# Patient Record
Sex: FEMALE | Race: WHITE | NOT HISPANIC OR LATINO | Employment: OTHER | URBAN - METROPOLITAN AREA
[De-identification: names, ages, dates, MRNs, and addresses within clinical notes are randomized per-mention and may not be internally consistent; named-entity substitution may affect disease eponyms.]

---

## 2017-01-11 ENCOUNTER — HOSPITAL ENCOUNTER (OUTPATIENT)
Dept: FAMILY MEDICINE CLINIC | Facility: CLINIC | Age: 77
Setting detail: SPECIMEN
Discharge: HOME | End: 2017-01-11
Attending: FAMILY MEDICINE | Admitting: FAMILY MEDICINE

## 2017-01-11 LAB
ALBUMIN SERPL-MCNC: 3.9 G/DL (ref 3.5–4.8)
ALBUMIN/GLOB SERPL: 1.2 {RATIO} (ref 1–1.7)
ALP SERPL-CCNC: 76 IU/L (ref 32–91)
ALT SERPL-CCNC: 17 IU/L (ref 14–54)
ANION GAP SERPL CALC-SCNC: 14.3 MMOL/L (ref 10–20)
AST SERPL-CCNC: 23 IU/L (ref 15–41)
BILIRUB SERPL-MCNC: 0.7 MG/DL (ref 0.3–1.2)
BUN SERPL-MCNC: 26 MG/DL (ref 8–20)
BUN/CREAT SERPL: 21.7 (ref 5.4–26.2)
CALCIUM SERPL-MCNC: 9.5 MG/DL (ref 8.9–10.3)
CHLORIDE SERPL-SCNC: 108 MMOL/L (ref 101–111)
CHOLEST SERPL-MCNC: 227 MG/DL
CHOLEST/HDLC SERPL: 3.7 {RATIO}
CONV CO2: 22 MMOL/L (ref 22–32)
CONV LDL CHOLESTEROL DIRECT: 141 MG/DL (ref 0–100)
CONV TOTAL PROTEIN: 7.2 G/DL (ref 6.1–7.9)
CREAT UR-MCNC: 1.2 MG/DL (ref 0.4–1)
GLOBULIN UR ELPH-MCNC: 3.3 G/DL (ref 2.5–3.8)
GLUCOSE SERPL-MCNC: 116 MG/DL (ref 65–99)
HDLC SERPL-MCNC: 62 MG/DL
LDLC/HDLC SERPL: 2.3 {RATIO}
LIPID INTERPRETATION: ABNORMAL
POTASSIUM SERPL-SCNC: 5.3 MMOL/L (ref 3.6–5.1)
SODIUM SERPL-SCNC: 139 MMOL/L (ref 136–144)
TRIGL SERPL-MCNC: 148 MG/DL
VLDLC SERPL CALC-MCNC: 25 MG/DL

## 2017-01-16 ENCOUNTER — HOSPITAL ENCOUNTER (OUTPATIENT)
Dept: PHYSICAL THERAPY | Facility: HOSPITAL | Age: 77
Setting detail: RECURRING SERIES
Discharge: HOME | End: 2017-01-31
Attending: FAMILY MEDICINE | Admitting: FAMILY MEDICINE

## 2017-04-25 ENCOUNTER — HOSPITAL ENCOUNTER (OUTPATIENT)
Dept: LAB | Facility: HOSPITAL | Age: 77
Discharge: HOME | End: 2017-04-25
Attending: INTERNAL MEDICINE | Admitting: INTERNAL MEDICINE

## 2017-04-25 LAB
ANION GAP SERPL CALC-SCNC: 16.9 MMOL/L (ref 10–20)
BACTERIA SPEC AEROBE CULT: NORMAL
BILIRUB UR QL STRIP: NEGATIVE MG/DL
BUN SERPL-MCNC: 30 MG/DL (ref 8–20)
BUN/CREAT SERPL: 20 (ref 5.4–26.2)
CALCIUM SERPL-MCNC: 9.6 MG/DL (ref 8.9–10.3)
CASTS URNS QL MICRO: ABNORMAL /[LPF]
CHLORIDE SERPL-SCNC: 108 MMOL/L (ref 101–111)
COLOR UR: YELLOW
CONV BACTERIA IN URINE MICRO: ABNORMAL
CONV CLARITY OF URINE: ABNORMAL
CONV CO2: 23 MMOL/L (ref 22–32)
CONV HYALINE CASTS IN URINE MICRO: ABNORMAL /[LPF] (ref 0–5)
CONV PROTEIN IN URINE BY AUTOMATED TEST STRIP: NEGATIVE MG/DL
CONV SMALL ROUND CELLS: ABNORMAL /[HPF]
CONV UROBILINOGEN IN URINE BY AUTOMATED TEST STRIP: 0.2 MG/DL
CREAT UR-MCNC: 1.5 MG/DL (ref 0.4–1)
CULTURE INDICATED?: ABNORMAL
ERYTHROCYTE [DISTWIDTH] IN BLOOD BY AUTOMATED COUNT: 14.6 % (ref 11.5–14.5)
GLUCOSE SERPL-MCNC: 127 MG/DL (ref 65–99)
GLUCOSE UR QL: NEGATIVE MG/DL
HCT VFR BLD AUTO: 37.4 % (ref 35–49)
HGB BLD-MCNC: 12.5 G/DL (ref 12–15)
HGB UR QL STRIP: NEGATIVE
KETONES UR QL STRIP: NEGATIVE MG/DL
LEUKOCYTE ESTERASE UR QL STRIP: NEGATIVE
Lab: NORMAL
MCH RBC QN AUTO: 31.3 PG (ref 26–32)
MCHC RBC AUTO-ENTMCNC: 33.4 G/DL (ref 32–36)
MCV RBC AUTO: 93.8 FL (ref 80–94)
MICRO REPORT STATUS: NORMAL
NITRITE UR QL STRIP: NEGATIVE
PH UR STRIP.AUTO: 5.5 [PH] (ref 4.5–8)
PLATELET # BLD AUTO: 239 10*3/UL (ref 150–450)
PMV BLD AUTO: 8.8 FL (ref 7.4–10.4)
POTASSIUM SERPL-SCNC: 4.9 MMOL/L (ref 3.6–5.1)
RBC # BLD AUTO: 3.98 10*6/UL (ref 4–5.4)
RBC #/AREA URNS HPF: 2 /[HPF] (ref 0–3)
SODIUM SERPL-SCNC: 143 MMOL/L (ref 136–144)
SP GR UR: 1.01 (ref 1–1.03)
SPECIMEN SOURCE: NORMAL
SPERM URNS QL MICRO: ABNORMAL /[HPF]
SQUAMOUS SPT QL MICRO: 6 /[HPF] (ref 0–5)
UNIDENT CRYS URNS QL MICRO: ABNORMAL /[HPF]
WBC # BLD AUTO: 5.2 10*3/UL (ref 4.5–11.5)
WBC #/AREA URNS HPF: 5 /[HPF] (ref 0–5)
YEAST SPEC QL WET PREP: ABNORMAL /[HPF]

## 2017-07-11 ENCOUNTER — HOSPITAL ENCOUNTER (OUTPATIENT)
Dept: FAMILY MEDICINE CLINIC | Facility: CLINIC | Age: 77
Setting detail: SPECIMEN
Discharge: HOME | End: 2017-07-11
Attending: FAMILY MEDICINE | Admitting: FAMILY MEDICINE

## 2017-07-11 LAB
ALBUMIN SERPL-MCNC: 4.1 G/DL (ref 3.5–4.8)
ALBUMIN/GLOB SERPL: 1.1 {RATIO} (ref 1–1.7)
ALP SERPL-CCNC: 64 IU/L (ref 32–91)
ALT SERPL-CCNC: 20 IU/L (ref 14–54)
ANION GAP SERPL CALC-SCNC: 16.8 MMOL/L (ref 10–20)
AST SERPL-CCNC: 26 IU/L (ref 15–41)
BASOPHILS # BLD AUTO: 0 10*3/UL (ref 0–0.2)
BASOPHILS NFR BLD AUTO: 1 % (ref 0–2)
BILIRUB SERPL-MCNC: 0.9 MG/DL (ref 0.3–1.2)
BUN SERPL-MCNC: 35 MG/DL (ref 8–20)
BUN/CREAT SERPL: 21.9 (ref 5.4–26.2)
CALCIUM SERPL-MCNC: 9.6 MG/DL (ref 8.9–10.3)
CHLORIDE SERPL-SCNC: 106 MMOL/L (ref 101–111)
CHOLEST SERPL-MCNC: 244 MG/DL
CHOLEST/HDLC SERPL: 3.7 {RATIO}
CONV CO2: 20 MMOL/L (ref 22–32)
CONV LDL CHOLESTEROL DIRECT: 161 MG/DL (ref 0–100)
CONV TOTAL PROTEIN: 7.8 G/DL (ref 6.1–7.9)
CREAT UR-MCNC: 1.6 MG/DL (ref 0.4–1)
DIFFERENTIAL METHOD BLD: (no result)
EOSINOPHIL # BLD AUTO: 0.3 10*3/UL (ref 0–0.3)
EOSINOPHIL # BLD AUTO: 6 % (ref 0–3)
ERYTHROCYTE [DISTWIDTH] IN BLOOD BY AUTOMATED COUNT: 14.5 % (ref 11.5–14.5)
GLOBULIN UR ELPH-MCNC: 3.7 G/DL (ref 2.5–3.8)
GLUCOSE SERPL-MCNC: 114 MG/DL (ref 65–99)
HCT VFR BLD AUTO: 37 % (ref 35–49)
HDLC SERPL-MCNC: 66 MG/DL
HGB BLD-MCNC: 12.1 G/DL (ref 12–15)
LDLC/HDLC SERPL: 2.4 {RATIO}
LIPID INTERPRETATION: ABNORMAL
LYMPHOCYTES # BLD AUTO: 1.7 10*3/UL (ref 0.8–4.8)
LYMPHOCYTES NFR BLD AUTO: 29 % (ref 18–42)
MCH RBC QN AUTO: 31.2 PG (ref 26–32)
MCHC RBC AUTO-ENTMCNC: 32.7 G/DL (ref 32–36)
MCV RBC AUTO: 95.6 FL (ref 80–94)
MONOCYTES # BLD AUTO: 0.6 10*3/UL (ref 0.1–1.3)
MONOCYTES NFR BLD AUTO: 10 % (ref 2–11)
NEUTROPHILS # BLD AUTO: 3.2 10*3/UL (ref 2.3–8.6)
NEUTROPHILS NFR BLD AUTO: 54 % (ref 50–75)
NRBC BLD AUTO-RTO: 0 /100{WBCS}
NRBC/RBC NFR BLD MANUAL: 0 10*3/UL
PLATELET # BLD AUTO: 258 10*3/UL (ref 150–450)
PMV BLD AUTO: 8.4 FL (ref 7.4–10.4)
POTASSIUM SERPL-SCNC: 4.8 MMOL/L (ref 3.6–5.1)
RBC # BLD AUTO: 3.86 10*6/UL (ref 4–5.4)
SODIUM SERPL-SCNC: 138 MMOL/L (ref 136–144)
TRIGL SERPL-MCNC: 132 MG/DL
TSH SERPL-ACNC: 2.18 UIU/ML (ref 0.34–5.6)
VLDLC SERPL CALC-MCNC: 17.3 MG/DL
WBC # BLD AUTO: 5.8 10*3/UL (ref 4.5–11.5)

## 2017-07-19 ENCOUNTER — HOSPITAL ENCOUNTER (OUTPATIENT)
Dept: CARDIOLOGY | Facility: HOSPITAL | Age: 77
Discharge: HOME | End: 2017-07-19
Attending: INTERNAL MEDICINE | Admitting: INTERNAL MEDICINE

## 2017-10-26 ENCOUNTER — HOSPITAL ENCOUNTER (OUTPATIENT)
Dept: LAB | Facility: HOSPITAL | Age: 77
Discharge: HOME | End: 2017-10-26
Attending: INTERNAL MEDICINE | Admitting: INTERNAL MEDICINE

## 2017-10-26 LAB
ANION GAP SERPL CALC-SCNC: 11.4 MMOL/L (ref 10–20)
BACTERIA SPEC AEROBE CULT: NORMAL
BACTERIA SPEC AEROBE CULT: NORMAL
BASOPHILS # BLD AUTO: 0 10*3/UL (ref 0–0.2)
BASOPHILS NFR BLD AUTO: 1 % (ref 0–2)
BILIRUB UR QL STRIP: NEGATIVE MG/DL
BUN SERPL-MCNC: 26 MG/DL (ref 8–20)
BUN/CREAT SERPL: 20 (ref 5.4–26.2)
CALCIUM SERPL-MCNC: 9.2 MG/DL (ref 8.9–10.3)
CASTS URNS QL MICRO: ABNORMAL /[LPF]
CHLORIDE SERPL-SCNC: 106 MMOL/L (ref 101–111)
COLONY COUNT: NORMAL
COLOR UR: YELLOW
CONV BACTERIA IN URINE MICRO: ABNORMAL
CONV CLARITY OF URINE: ABNORMAL
CONV CO2: 23 MMOL/L (ref 22–32)
CONV HYALINE CASTS IN URINE MICRO: 0 /[LPF] (ref 0–5)
CONV PROTEIN IN URINE BY AUTOMATED TEST STRIP: NEGATIVE MG/DL
CONV SMALL ROUND CELLS: ABNORMAL /[HPF]
CONV UROBILINOGEN IN URINE BY AUTOMATED TEST STRIP: 0.2 MG/DL
CREAT UR-MCNC: 1.3 MG/DL (ref 0.4–1)
CULTURE INDICATED?: ABNORMAL
DIFFERENTIAL METHOD BLD: (no result)
EOSINOPHIL # BLD AUTO: 0.3 10*3/UL (ref 0–0.3)
EOSINOPHIL # BLD AUTO: 6 % (ref 0–3)
ERYTHROCYTE [DISTWIDTH] IN BLOOD BY AUTOMATED COUNT: 13.7 % (ref 11.5–14.5)
GLUCOSE SERPL-MCNC: 117 MG/DL (ref 65–99)
GLUCOSE UR QL: NEGATIVE MG/DL
HCT VFR BLD AUTO: 38.3 % (ref 35–49)
HGB BLD-MCNC: 12.6 G/DL (ref 12–15)
HGB UR QL STRIP: NEGATIVE
KETONES UR QL STRIP: NEGATIVE MG/DL
LEUKOCYTE ESTERASE UR QL STRIP: ABNORMAL
LYMPHOCYTES # BLD AUTO: 1.5 10*3/UL (ref 0.8–4.8)
LYMPHOCYTES NFR BLD AUTO: 29 % (ref 18–42)
Lab: NORMAL
MCH RBC QN AUTO: 31.3 PG (ref 26–32)
MCHC RBC AUTO-ENTMCNC: 32.9 G/DL (ref 32–36)
MCV RBC AUTO: 95.3 FL (ref 80–94)
MICRO REPORT STATUS: NORMAL
MONOCYTES # BLD AUTO: 0.5 10*3/UL (ref 0.1–1.3)
MONOCYTES NFR BLD AUTO: 9 % (ref 2–11)
NEUTROPHILS # BLD AUTO: 2.9 10*3/UL (ref 2.3–8.6)
NEUTROPHILS NFR BLD AUTO: 55 % (ref 50–75)
NITRITE UR QL STRIP: NEGATIVE
NRBC BLD AUTO-RTO: 0 /100{WBCS}
NRBC/RBC NFR BLD MANUAL: 0 10*3/UL
PH UR STRIP.AUTO: 5.5 [PH] (ref 4.5–8)
PHOSPHATE SERPL-MCNC: 3.6 MG/DL (ref 2.4–4.7)
PLATELET # BLD AUTO: 252 10*3/UL (ref 150–450)
PMV BLD AUTO: 8.4 FL (ref 7.4–10.4)
POTASSIUM SERPL-SCNC: 4.4 MMOL/L (ref 3.6–5.1)
RBC # BLD AUTO: 4.02 10*6/UL (ref 4–5.4)
RBC #/AREA URNS HPF: 1 /[HPF] (ref 0–3)
SODIUM SERPL-SCNC: 136 MMOL/L (ref 136–144)
SP GR UR: 1.01 (ref 1–1.03)
SPECIMEN SOURCE: NORMAL
SPERM URNS QL MICRO: ABNORMAL /[HPF]
SQUAMOUS SPT QL MICRO: 8 /[HPF] (ref 0–5)
UNIDENT CRYS URNS QL MICRO: ABNORMAL /[HPF]
WBC # BLD AUTO: 5.2 10*3/UL (ref 4.5–11.5)
WBC #/AREA URNS HPF: 8 /[HPF] (ref 0–5)
YEAST SPEC QL WET PREP: ABNORMAL /[HPF]

## 2017-10-27 LAB — PTH-INTACT SERPL-MCNC: 65 PG/ML (ref 11–72)

## 2018-01-09 ENCOUNTER — HOSPITAL ENCOUNTER (OUTPATIENT)
Dept: FAMILY MEDICINE CLINIC | Facility: CLINIC | Age: 78
Setting detail: SPECIMEN
Discharge: HOME | End: 2018-01-09
Attending: FAMILY MEDICINE | Admitting: FAMILY MEDICINE

## 2018-01-09 LAB
ALBUMIN SERPL-MCNC: 3.6 G/DL (ref 3.5–4.8)
ALBUMIN/GLOB SERPL: 1.1 {RATIO} (ref 1–1.7)
ALP SERPL-CCNC: 62 IU/L (ref 32–91)
ALT SERPL-CCNC: 21 IU/L (ref 14–54)
ANION GAP SERPL CALC-SCNC: 11 MMOL/L (ref 10–20)
AST SERPL-CCNC: 25 IU/L (ref 15–41)
BILIRUB SERPL-MCNC: 0.5 MG/DL (ref 0.3–1.2)
BUN SERPL-MCNC: 20 MG/DL (ref 8–20)
BUN/CREAT SERPL: 15.4 (ref 5.4–26.2)
CALCIUM SERPL-MCNC: 9.1 MG/DL (ref 8.9–10.3)
CHLORIDE SERPL-SCNC: 109 MMOL/L (ref 101–111)
CHOLEST SERPL-MCNC: 213 MG/DL
CHOLEST/HDLC SERPL: 3.7 {RATIO}
CONV CO2: 24 MMOL/L (ref 22–32)
CONV LDL CHOLESTEROL DIRECT: 138 MG/DL (ref 0–100)
CONV TOTAL PROTEIN: 6.9 G/DL (ref 6.1–7.9)
CREAT UR-MCNC: 1.3 MG/DL (ref 0.4–1)
GLOBULIN UR ELPH-MCNC: 3.3 G/DL (ref 2.5–3.8)
GLUCOSE SERPL-MCNC: 110 MG/DL (ref 65–99)
HDLC SERPL-MCNC: 58 MG/DL
LDLC/HDLC SERPL: 2.4 {RATIO}
LIPID INTERPRETATION: ABNORMAL
POTASSIUM SERPL-SCNC: 5 MMOL/L (ref 3.6–5.1)
SODIUM SERPL-SCNC: 139 MMOL/L (ref 136–144)
TRIGL SERPL-MCNC: 97 MG/DL
VLDLC SERPL CALC-MCNC: 17 MG/DL

## 2018-04-27 ENCOUNTER — HOSPITAL ENCOUNTER (OUTPATIENT)
Dept: LAB | Facility: HOSPITAL | Age: 78
Discharge: HOME | End: 2018-04-27
Attending: INTERNAL MEDICINE | Admitting: INTERNAL MEDICINE

## 2018-04-27 LAB
ANION GAP SERPL CALC-SCNC: 12.7 MMOL/L (ref 10–20)
BACTERIA SPEC AEROBE CULT: NORMAL
BASOPHILS # BLD AUTO: 0.1 10*3/UL (ref 0–0.2)
BASOPHILS NFR BLD AUTO: 1 % (ref 0–2)
BILIRUB UR QL STRIP: NEGATIVE MG/DL
BUN SERPL-MCNC: 39 MG/DL (ref 8–20)
BUN/CREAT SERPL: 27.9 (ref 5.4–26.2)
CALCIUM SERPL-MCNC: 9.3 MG/DL (ref 8.9–10.3)
CASTS URNS QL MICRO: ABNORMAL /[LPF]
CHLORIDE SERPL-SCNC: 107 MMOL/L (ref 101–111)
COLOR UR: YELLOW
CONV BACTERIA IN URINE MICRO: ABNORMAL
CONV CLARITY OF URINE: ABNORMAL
CONV CO2: 24 MMOL/L (ref 22–32)
CONV HYALINE CASTS IN URINE MICRO: ABNORMAL /[LPF] (ref 0–5)
CONV PROTEIN IN URINE BY AUTOMATED TEST STRIP: NEGATIVE MG/DL
CONV SMALL ROUND CELLS: ABNORMAL /[HPF]
CONV UROBILINOGEN IN URINE BY AUTOMATED TEST STRIP: 0.2 MG/DL
CREAT UR-MCNC: 1.4 MG/DL (ref 0.4–1)
CULTURE INDICATED?: ABNORMAL
DIFFERENTIAL METHOD BLD: (no result)
EOSINOPHIL # BLD AUTO: 0.4 10*3/UL (ref 0–0.3)
EOSINOPHIL # BLD AUTO: 5 % (ref 0–3)
ERYTHROCYTE [DISTWIDTH] IN BLOOD BY AUTOMATED COUNT: 13.8 % (ref 11.5–14.5)
GLUCOSE SERPL-MCNC: 123 MG/DL (ref 65–99)
GLUCOSE UR QL: NEGATIVE MG/DL
HCT VFR BLD AUTO: 37.3 % (ref 35–49)
HGB BLD-MCNC: 11.9 G/DL (ref 12–15)
HGB UR QL STRIP: NEGATIVE
KETONES UR QL STRIP: NEGATIVE MG/DL
LEUKOCYTE ESTERASE UR QL STRIP: ABNORMAL
LYMPHOCYTES # BLD AUTO: 1.9 10*3/UL (ref 0.8–4.8)
LYMPHOCYTES NFR BLD AUTO: 24 % (ref 18–42)
Lab: NORMAL
MCH RBC QN AUTO: 30.5 PG (ref 26–32)
MCHC RBC AUTO-ENTMCNC: 32.1 G/DL (ref 32–36)
MCV RBC AUTO: 95.1 FL (ref 80–94)
MICRO REPORT STATUS: NORMAL
MONOCYTES # BLD AUTO: 0.8 10*3/UL (ref 0.1–1.3)
MONOCYTES NFR BLD AUTO: 10 % (ref 2–11)
NEUTROPHILS # BLD AUTO: 5 10*3/UL (ref 2.3–8.6)
NEUTROPHILS NFR BLD AUTO: 60 % (ref 50–75)
NITRITE UR QL STRIP: NEGATIVE
NRBC BLD AUTO-RTO: 0 /100{WBCS}
NRBC/RBC NFR BLD MANUAL: 0 10*3/UL
PH UR STRIP.AUTO: 5.5 [PH] (ref 4.5–8)
PLATELET # BLD AUTO: 300 10*3/UL (ref 150–450)
PMV BLD AUTO: 8 FL (ref 7.4–10.4)
POTASSIUM SERPL-SCNC: 4.7 MMOL/L (ref 3.6–5.1)
RBC # BLD AUTO: 3.92 10*6/UL (ref 4–5.4)
RBC #/AREA URNS HPF: 2 /[HPF] (ref 0–3)
SODIUM SERPL-SCNC: 139 MMOL/L (ref 136–144)
SP GR UR: 1.02 (ref 1–1.03)
SPECIMEN SOURCE: ABNORMAL
SPECIMEN SOURCE: NORMAL
SPERM URNS QL MICRO: ABNORMAL /[HPF]
SQUAMOUS SPT QL MICRO: 7 /[HPF] (ref 0–5)
UNIDENT CRYS URNS QL MICRO: ABNORMAL /[HPF]
WBC # BLD AUTO: 8.1 10*3/UL (ref 4.5–11.5)
WBC #/AREA URNS HPF: 7 /[HPF] (ref 0–5)
YEAST SPEC QL WET PREP: ABNORMAL /[HPF]

## 2018-07-10 ENCOUNTER — HOSPITAL ENCOUNTER (OUTPATIENT)
Dept: FAMILY MEDICINE CLINIC | Facility: CLINIC | Age: 78
Setting detail: SPECIMEN
Discharge: HOME | End: 2018-07-10
Attending: FAMILY MEDICINE | Admitting: FAMILY MEDICINE

## 2018-07-10 LAB
ALBUMIN SERPL-MCNC: 3.7 G/DL (ref 3.5–4.8)
ALBUMIN/GLOB SERPL: 1 {RATIO} (ref 1–1.7)
ALP SERPL-CCNC: 62 IU/L (ref 32–91)
ALT SERPL-CCNC: 17 IU/L (ref 14–54)
ANION GAP SERPL CALC-SCNC: 12.6 MMOL/L (ref 10–20)
AST SERPL-CCNC: 24 IU/L (ref 15–41)
BASOPHILS # BLD AUTO: 0 10*3/UL (ref 0–0.2)
BASOPHILS NFR BLD AUTO: 1 % (ref 0–2)
BILIRUB SERPL-MCNC: 0.7 MG/DL (ref 0.3–1.2)
BUN SERPL-MCNC: 31 MG/DL (ref 8–20)
BUN/CREAT SERPL: 22.1 (ref 5.4–26.2)
CALCIUM SERPL-MCNC: 9.2 MG/DL (ref 8.9–10.3)
CHLORIDE SERPL-SCNC: 108 MMOL/L (ref 101–111)
CHOLEST SERPL-MCNC: 186 MG/DL
CHOLEST/HDLC SERPL: 3.5 {RATIO}
CONV CO2: 23 MMOL/L (ref 22–32)
CONV LDL CHOLESTEROL DIRECT: 115 MG/DL (ref 0–100)
CONV TOTAL PROTEIN: 7.4 G/DL (ref 6.1–7.9)
CREAT UR-MCNC: 1.4 MG/DL (ref 0.4–1)
DIFFERENTIAL METHOD BLD: (no result)
EOSINOPHIL # BLD AUTO: 0.3 10*3/UL (ref 0–0.3)
EOSINOPHIL # BLD AUTO: 5 % (ref 0–3)
ERYTHROCYTE [DISTWIDTH] IN BLOOD BY AUTOMATED COUNT: 14.5 % (ref 11.5–14.5)
GLOBULIN UR ELPH-MCNC: 3.7 G/DL (ref 2.5–3.8)
GLUCOSE SERPL-MCNC: 110 MG/DL (ref 65–99)
HCT VFR BLD AUTO: 35.1 % (ref 35–49)
HDLC SERPL-MCNC: 53 MG/DL
HGB BLD-MCNC: 11.4 G/DL (ref 12–15)
LDLC/HDLC SERPL: 2.2 {RATIO}
LIPID INTERPRETATION: ABNORMAL
LYMPHOCYTES # BLD AUTO: 1.7 10*3/UL (ref 0.8–4.8)
LYMPHOCYTES NFR BLD AUTO: 27 % (ref 18–42)
MCH RBC QN AUTO: 30.3 PG (ref 26–32)
MCHC RBC AUTO-ENTMCNC: 32.4 G/DL (ref 32–36)
MCV RBC AUTO: 93.5 FL (ref 80–94)
MONOCYTES # BLD AUTO: 0.6 10*3/UL (ref 0.1–1.3)
MONOCYTES NFR BLD AUTO: 10 % (ref 2–11)
NEUTROPHILS # BLD AUTO: 3.6 10*3/UL (ref 2.3–8.6)
NEUTROPHILS NFR BLD AUTO: 57 % (ref 50–75)
NRBC BLD AUTO-RTO: 0 /100{WBCS}
NRBC/RBC NFR BLD MANUAL: 0 10*3/UL
PLATELET # BLD AUTO: 265 10*3/UL (ref 150–450)
PMV BLD AUTO: 8.3 FL (ref 7.4–10.4)
POTASSIUM SERPL-SCNC: 5.6 MMOL/L (ref 3.6–5.1)
RBC # BLD AUTO: 3.75 10*6/UL (ref 4–5.4)
SODIUM SERPL-SCNC: 138 MMOL/L (ref 136–144)
TRIGL SERPL-MCNC: 121 MG/DL
VLDLC SERPL CALC-MCNC: 18.6 MG/DL
WBC # BLD AUTO: 6.2 10*3/UL (ref 4.5–11.5)

## 2018-07-16 ENCOUNTER — HOSPITAL ENCOUNTER (OUTPATIENT)
Dept: PHYSICAL THERAPY | Facility: HOSPITAL | Age: 78
Setting detail: RECURRING SERIES
Discharge: HOME | End: 2018-09-21
Attending: FAMILY MEDICINE | Admitting: FAMILY MEDICINE

## 2018-07-17 ENCOUNTER — HOSPITAL ENCOUNTER (OUTPATIENT)
Dept: FAMILY MEDICINE CLINIC | Facility: CLINIC | Age: 78
Setting detail: SPECIMEN
Discharge: HOME | End: 2018-07-17
Attending: FAMILY MEDICINE | Admitting: FAMILY MEDICINE

## 2018-07-17 LAB
BASOPHILS # BLD AUTO: 0 10*3/UL (ref 0–0.2)
BASOPHILS NFR BLD AUTO: 0 % (ref 0–2)
DIFFERENTIAL METHOD BLD: (no result)
EOSINOPHIL # BLD AUTO: 0.3 10*3/UL (ref 0–0.3)
EOSINOPHIL # BLD AUTO: 4 % (ref 0–3)
ERYTHROCYTE [DISTWIDTH] IN BLOOD BY AUTOMATED COUNT: 14.7 % (ref 11.5–14.5)
HCT VFR BLD AUTO: 35.8 % (ref 35–49)
HGB BLD-MCNC: 11.6 G/DL (ref 12–15)
LYMPHOCYTES # BLD AUTO: 1.9 10*3/UL (ref 0.8–4.8)
LYMPHOCYTES NFR BLD AUTO: 25 % (ref 18–42)
MCH RBC QN AUTO: 30.4 PG (ref 26–32)
MCHC RBC AUTO-ENTMCNC: 32.5 G/DL (ref 32–36)
MCV RBC AUTO: 93.4 FL (ref 80–94)
MONOCYTES # BLD AUTO: 0.9 10*3/UL (ref 0.1–1.3)
MONOCYTES NFR BLD AUTO: 11 % (ref 2–11)
NEUTROPHILS # BLD AUTO: 4.6 10*3/UL (ref 2.3–8.6)
NEUTROPHILS NFR BLD AUTO: 60 % (ref 50–75)
NRBC BLD AUTO-RTO: 0 /100{WBCS}
NRBC/RBC NFR BLD MANUAL: 0 10*3/UL
PLATELET # BLD AUTO: 278 10*3/UL (ref 150–450)
PMV BLD AUTO: 7.8 FL (ref 7.4–10.4)
RBC # BLD AUTO: 3.83 10*6/UL (ref 4–5.4)
WBC # BLD AUTO: 7.7 10*3/UL (ref 4.5–11.5)

## 2018-08-02 ENCOUNTER — HOSPITAL ENCOUNTER (OUTPATIENT)
Dept: ORTHOPEDIC SURGERY | Facility: CLINIC | Age: 78
Discharge: HOME | End: 2018-08-02
Attending: ORTHOPAEDIC SURGERY | Admitting: ORTHOPAEDIC SURGERY

## 2018-08-31 ENCOUNTER — HOSPITAL ENCOUNTER (OUTPATIENT)
Dept: INFUSION THERAPY | Facility: HOSPITAL | Age: 78
Discharge: HOME | End: 2018-08-31
Attending: PHYSICIAN ASSISTANT | Admitting: PHYSICIAN ASSISTANT

## 2018-08-31 LAB — CREAT BLDA-MCNC: 1.5 MG/DL (ref 0.6–1.3)

## 2018-10-24 ENCOUNTER — HOSPITAL ENCOUNTER (OUTPATIENT)
Dept: LAB | Facility: HOSPITAL | Age: 78
Setting detail: SPECIMEN
Discharge: HOME | End: 2018-10-24
Attending: INTERNAL MEDICINE | Admitting: INTERNAL MEDICINE

## 2018-10-24 LAB
AMPICILLIN SUSC ISLT: NORMAL
ANION GAP SERPL CALC-SCNC: 15.1 MMOL/L (ref 10–20)
AZTREONAM SUSC ISLT: NORMAL
BACTERIA ISLT: NORMAL
BACTERIA SPEC AEROBE CULT: NORMAL
BASOPHILS # BLD AUTO: 0.1 10*3/UL (ref 0–0.2)
BASOPHILS NFR BLD AUTO: 1 % (ref 0–2)
BILIRUB UR QL STRIP: NEGATIVE MG/DL
BUN SERPL-MCNC: 34 MG/DL (ref 8–20)
BUN/CREAT SERPL: 24.3 (ref 5.4–26.2)
CALCIUM SERPL-MCNC: 9.1 MG/DL (ref 8.9–10.3)
CASTS URNS QL MICRO: ABNORMAL /[LPF]
CEFAZOLIN SUSC ISLT: NORMAL
CEFEPIME SUSC ISLT: NORMAL
CEFTRIAXONE SUSC ISLT: NORMAL
CHLORIDE SERPL-SCNC: 108 MMOL/L (ref 101–111)
CIPROFLOXACIN SUSC ISLT: NORMAL
COLONY COUNT: NORMAL
COLOR UR: YELLOW
CONV BACTERIA IN URINE MICRO: ABNORMAL
CONV CLARITY OF URINE: ABNORMAL
CONV CO2: 23 MMOL/L (ref 22–32)
CONV HYALINE CASTS IN URINE MICRO: ABNORMAL /[LPF] (ref 0–5)
CONV PROTEIN IN URINE BY AUTOMATED TEST STRIP: NEGATIVE MG/DL
CONV SMALL ROUND CELLS: ABNORMAL /[HPF]
CONV UROBILINOGEN IN URINE BY AUTOMATED TEST STRIP: 0.2 MG/DL
CREAT UR-MCNC: 1.4 MG/DL (ref 0.4–1)
CULTURE INDICATED?: ABNORMAL
DIFFERENTIAL METHOD BLD: (no result)
EOSINOPHIL # BLD AUTO: 0.4 10*3/UL (ref 0–0.3)
EOSINOPHIL # BLD AUTO: 6 % (ref 0–3)
ERYTHROCYTE [DISTWIDTH] IN BLOOD BY AUTOMATED COUNT: 14.2 % (ref 11.5–14.5)
GLUCOSE SERPL-MCNC: 77 MG/DL (ref 65–99)
GLUCOSE UR QL: NEGATIVE MG/DL
HCT VFR BLD AUTO: 35.6 % (ref 35–49)
HGB BLD-MCNC: 11.9 G/DL (ref 12–15)
HGB UR QL STRIP: ABNORMAL
KETONES UR QL STRIP: NEGATIVE MG/DL
LEUKOCYTE ESTERASE UR QL STRIP: ABNORMAL
LEVOFLOXACIN SUSC ISLT: NORMAL
LYMPHOCYTES # BLD AUTO: 1.7 10*3/UL (ref 0.8–4.8)
LYMPHOCYTES NFR BLD AUTO: 30 % (ref 18–42)
Lab: NORMAL
MCH RBC QN AUTO: 31.6 PG (ref 26–32)
MCHC RBC AUTO-ENTMCNC: 33.3 G/DL (ref 32–36)
MCV RBC AUTO: 94.8 FL (ref 80–94)
MEROPENEM SUSC ISLT: NORMAL
MICRO REPORT STATUS: NORMAL
MONOCYTES # BLD AUTO: 0.6 10*3/UL (ref 0.1–1.3)
MONOCYTES NFR BLD AUTO: 11 % (ref 2–11)
NEUTROPHILS # BLD AUTO: 3.1 10*3/UL (ref 2.3–8.6)
NEUTROPHILS NFR BLD AUTO: 52 % (ref 50–75)
NITRITE UR QL STRIP: POSITIVE
NITROFURANTOIN SUSC ISLT: NORMAL
NRBC BLD AUTO-RTO: 0 /100{WBCS}
NRBC/RBC NFR BLD MANUAL: 0 10*3/UL
PH UR STRIP.AUTO: 6 [PH] (ref 4.5–8)
PIP+TAZO SUSC ISLT: NORMAL
PLATELET # BLD AUTO: 285 10*3/UL (ref 150–450)
PMV BLD AUTO: 7.7 FL (ref 7.4–10.4)
POTASSIUM SERPL-SCNC: 5.1 MMOL/L (ref 3.6–5.1)
RBC # BLD AUTO: 3.76 10*6/UL (ref 4–5.4)
RBC #/AREA URNS HPF: 5 /[HPF] (ref 0–3)
SODIUM SERPL-SCNC: 141 MMOL/L (ref 136–144)
SP GR UR: 1.01 (ref 1–1.03)
SPECIMEN SOURCE: NORMAL
SPERM URNS QL MICRO: ABNORMAL /[HPF]
SQUAMOUS SPT QL MICRO: 22 /[HPF] (ref 0–5)
SUSC METH SPEC: NORMAL
TETRACYCLINE SUSC ISLT: NORMAL
TOBRAMYCIN SUSC ISLT: NORMAL
TRIMETHOPRIM/SULFA: NORMAL
UNIDENT CRYS URNS QL MICRO: ABNORMAL /[HPF]
WBC # BLD AUTO: 5.9 10*3/UL (ref 4.5–11.5)
WBC #/AREA URNS HPF: ABNORMAL /[HPF] (ref 0–5)
YEAST SPEC QL WET PREP: ABNORMAL /[HPF]

## 2019-01-08 ENCOUNTER — HOSPITAL ENCOUNTER (OUTPATIENT)
Dept: FAMILY MEDICINE CLINIC | Facility: CLINIC | Age: 79
Setting detail: SPECIMEN
Discharge: HOME | End: 2019-01-08
Attending: FAMILY MEDICINE | Admitting: FAMILY MEDICINE

## 2019-01-08 LAB
ALBUMIN SERPL-MCNC: 3.6 G/DL (ref 3.5–4.8)
ALBUMIN/GLOB SERPL: 1 {RATIO} (ref 1–1.7)
ALP SERPL-CCNC: 68 IU/L (ref 32–91)
ALT SERPL-CCNC: 16 IU/L (ref 14–54)
AMPICILLIN SUSC ISLT: NORMAL
ANION GAP SERPL CALC-SCNC: 14.3 MMOL/L (ref 10–20)
AST SERPL-CCNC: 24 IU/L (ref 15–41)
AZTREONAM SUSC ISLT: NORMAL
BACTERIA ISLT: NORMAL
BACTERIA SPEC AEROBE CULT: NORMAL
BASOPHILS # BLD AUTO: 0.1 10*3/UL (ref 0–0.2)
BASOPHILS NFR BLD AUTO: 1 % (ref 0–2)
BILIRUB SERPL-MCNC: 0.7 MG/DL (ref 0.3–1.2)
BUN SERPL-MCNC: 36 MG/DL (ref 8–20)
BUN/CREAT SERPL: 24 (ref 5.4–26.2)
CALCIUM SERPL-MCNC: 9.2 MG/DL (ref 8.9–10.3)
CEFAZOLIN SUSC ISLT: NORMAL
CEFEPIME SUSC ISLT: NORMAL
CEFTRIAXONE SUSC ISLT: NORMAL
CHLORIDE SERPL-SCNC: 106 MMOL/L (ref 101–111)
CHOLEST SERPL-MCNC: 272 MG/DL
CHOLEST/HDLC SERPL: 5.1 {RATIO}
CIPROFLOXACIN SUSC ISLT: NORMAL
COLONY COUNT: NORMAL
CONV CO2: 21 MMOL/L (ref 22–32)
CONV LDL CHOLESTEROL DIRECT: 205 MG/DL (ref 0–100)
CONV TOTAL PROTEIN: 7.2 G/DL (ref 6.1–7.9)
CREAT UR-MCNC: 1.5 MG/DL (ref 0.4–1)
DIFFERENTIAL METHOD BLD: (no result)
EOSINOPHIL # BLD AUTO: 0.3 10*3/UL (ref 0–0.3)
EOSINOPHIL # BLD AUTO: 3 % (ref 0–3)
ERYTHROCYTE [DISTWIDTH] IN BLOOD BY AUTOMATED COUNT: 13.8 % (ref 11.5–14.5)
GLOBULIN UR ELPH-MCNC: 3.6 G/DL (ref 2.5–3.8)
GLUCOSE SERPL-MCNC: 169 MG/DL (ref 65–99)
HCT VFR BLD AUTO: 36 % (ref 35–49)
HDLC SERPL-MCNC: 53 MG/DL
HGB BLD-MCNC: 12.1 G/DL (ref 12–15)
LDLC/HDLC SERPL: 3.9 {RATIO}
LEVOFLOXACIN SUSC ISLT: NORMAL
LIPID INTERPRETATION: ABNORMAL
LYMPHOCYTES # BLD AUTO: 1.5 10*3/UL (ref 0.8–4.8)
LYMPHOCYTES NFR BLD AUTO: 20 % (ref 18–42)
Lab: NORMAL
MCH RBC QN AUTO: 32 PG (ref 26–32)
MCHC RBC AUTO-ENTMCNC: 33.5 G/DL (ref 32–36)
MCV RBC AUTO: 95.7 FL (ref 80–94)
MEROPENEM SUSC ISLT: NORMAL
MICRO REPORT STATUS: NORMAL
MONOCYTES # BLD AUTO: 0.6 10*3/UL (ref 0.1–1.3)
MONOCYTES NFR BLD AUTO: 8 % (ref 2–11)
NEUTROPHILS # BLD AUTO: 5.1 10*3/UL (ref 2.3–8.6)
NEUTROPHILS NFR BLD AUTO: 68 % (ref 50–75)
NITROFURANTOIN SUSC ISLT: NORMAL
NRBC BLD AUTO-RTO: 0 /100{WBCS}
NRBC/RBC NFR BLD MANUAL: 0 10*3/UL
PIP+TAZO SUSC ISLT: NORMAL
PLATELET # BLD AUTO: 275 10*3/UL (ref 150–450)
PMV BLD AUTO: 8.1 FL (ref 7.4–10.4)
POTASSIUM SERPL-SCNC: 5.3 MMOL/L (ref 3.6–5.1)
RBC # BLD AUTO: 3.76 10*6/UL (ref 4–5.4)
SODIUM SERPL-SCNC: 136 MMOL/L (ref 136–144)
SPECIMEN SOURCE: NORMAL
SUSC METH SPEC: NORMAL
TETRACYCLINE SUSC ISLT: NORMAL
TOBRAMYCIN SUSC ISLT: NORMAL
TRIGL SERPL-MCNC: 145 MG/DL
TRIMETHOPRIM/SULFA: NORMAL
VLDLC SERPL CALC-MCNC: 14.3 MG/DL
WBC # BLD AUTO: 7.5 10*3/UL (ref 4.5–11.5)

## 2019-02-22 ENCOUNTER — HOSPITAL ENCOUNTER (OUTPATIENT)
Dept: FAMILY MEDICINE CLINIC | Facility: CLINIC | Age: 79
Setting detail: SPECIMEN
Discharge: HOME | End: 2019-02-22
Attending: PHYSICIAN ASSISTANT | Admitting: PHYSICIAN ASSISTANT

## 2019-02-22 LAB
ALBUMIN SERPL-MCNC: 3.7 G/DL (ref 3.5–4.8)
ALBUMIN/GLOB SERPL: 1.1 {RATIO} (ref 1–1.7)
ALP SERPL-CCNC: 66 IU/L (ref 32–91)
ALT SERPL-CCNC: 17 IU/L (ref 14–54)
ANION GAP SERPL CALC-SCNC: 16.4 MMOL/L (ref 10–20)
AST SERPL-CCNC: 27 IU/L (ref 15–41)
BASOPHILS # BLD AUTO: 0 10*3/UL (ref 0–0.2)
BASOPHILS NFR BLD AUTO: 1 % (ref 0–2)
BILIRUB SERPL-MCNC: 0.7 MG/DL (ref 0.3–1.2)
BUN SERPL-MCNC: 30 MG/DL (ref 8–20)
BUN/CREAT SERPL: 21.4 (ref 5.4–26.2)
CALCIUM SERPL-MCNC: 9.3 MG/DL (ref 8.9–10.3)
CHLORIDE SERPL-SCNC: 104 MMOL/L (ref 101–111)
CONV CO2: 22 MMOL/L (ref 22–32)
CONV TOTAL PROTEIN: 7.1 G/DL (ref 6.1–7.9)
CREAT UR-MCNC: 1.4 MG/DL (ref 0.4–1)
DIFFERENTIAL METHOD BLD: (no result)
EOSINOPHIL # BLD AUTO: 0.4 10*3/UL (ref 0–0.3)
EOSINOPHIL # BLD AUTO: 6 % (ref 0–3)
ERYTHROCYTE [DISTWIDTH] IN BLOOD BY AUTOMATED COUNT: 13.9 % (ref 11.5–14.5)
GLOBULIN UR ELPH-MCNC: 3.4 G/DL (ref 2.5–3.8)
GLUCOSE SERPL-MCNC: 94 MG/DL (ref 65–99)
HCT VFR BLD AUTO: 35.2 % (ref 35–49)
HGB BLD-MCNC: 11.5 G/DL (ref 12–15)
LYMPHOCYTES # BLD AUTO: 2 10*3/UL (ref 0.8–4.8)
LYMPHOCYTES NFR BLD AUTO: 26 % (ref 18–42)
MCH RBC QN AUTO: 31.4 PG (ref 26–32)
MCHC RBC AUTO-ENTMCNC: 32.6 G/DL (ref 32–36)
MCV RBC AUTO: 96.2 FL (ref 80–94)
MONOCYTES # BLD AUTO: 0.7 10*3/UL (ref 0.1–1.3)
MONOCYTES NFR BLD AUTO: 9 % (ref 2–11)
NEUTROPHILS # BLD AUTO: 4.5 10*3/UL (ref 2.3–8.6)
NEUTROPHILS NFR BLD AUTO: 58 % (ref 50–75)
NRBC BLD AUTO-RTO: 0 /100{WBCS}
NRBC/RBC NFR BLD MANUAL: 0 10*3/UL
PLATELET # BLD AUTO: 321 10*3/UL (ref 150–450)
PMV BLD AUTO: 7.9 FL (ref 7.4–10.4)
POTASSIUM SERPL-SCNC: 4.4 MMOL/L (ref 3.6–5.1)
RBC # BLD AUTO: 3.66 10*6/UL (ref 4–5.4)
SODIUM SERPL-SCNC: 138 MMOL/L (ref 136–144)
WBC # BLD AUTO: 7.7 10*3/UL (ref 4.5–11.5)

## 2019-03-04 ENCOUNTER — HOSPITAL ENCOUNTER (OUTPATIENT)
Dept: PHYSICAL THERAPY | Facility: HOSPITAL | Age: 79
Setting detail: RECURRING SERIES
Discharge: HOME | End: 2019-06-14
Attending: PHYSICIAN ASSISTANT | Admitting: PHYSICIAN ASSISTANT

## 2019-05-01 ENCOUNTER — HOSPITAL ENCOUNTER (OUTPATIENT)
Dept: LAB | Facility: HOSPITAL | Age: 79
Setting detail: SPECIMEN
Discharge: HOME | End: 2019-05-01
Attending: INTERNAL MEDICINE | Admitting: INTERNAL MEDICINE

## 2019-06-19 ENCOUNTER — TRANSCRIBE ORDERS (OUTPATIENT)
Dept: PHYSICAL THERAPY | Facility: CLINIC | Age: 79
End: 2019-06-19

## 2019-06-19 ENCOUNTER — OFFICE VISIT (OUTPATIENT)
Dept: PHYSICAL THERAPY | Facility: CLINIC | Age: 79
End: 2019-06-19

## 2019-06-19 DIAGNOSIS — G83.10 MONOPLEGIA OF LOWER EXTREMITY, UNSPECIFIED ETIOLOGY, UNSPECIFIED LATERALITY (HCC): Primary | ICD-10-CM

## 2019-06-19 DIAGNOSIS — G83.10 MONOPLEGIA OF LOWER EXTREMITY, UNSPECIFIED ETIOLOGY, UNSPECIFIED LATERALITY (HCC): ICD-10-CM

## 2019-06-19 DIAGNOSIS — M54.5 LOW BACK PAIN, UNSPECIFIED BACK PAIN LATERALITY, UNSPECIFIED CHRONICITY, WITH SCIATICA PRESENCE UNSPECIFIED: ICD-10-CM

## 2019-06-19 DIAGNOSIS — M54.42 CHRONIC LEFT-SIDED LOW BACK PAIN WITH LEFT-SIDED SCIATICA: Primary | ICD-10-CM

## 2019-06-19 DIAGNOSIS — G89.29 CHRONIC LEFT-SIDED LOW BACK PAIN WITH LEFT-SIDED SCIATICA: Primary | ICD-10-CM

## 2019-06-19 PROCEDURE — 97140 MANUAL THERAPY 1/> REGIONS: CPT | Performed by: PHYSICAL THERAPIST

## 2019-06-19 PROCEDURE — 97530 THERAPEUTIC ACTIVITIES: CPT | Performed by: PHYSICAL THERAPIST

## 2019-06-19 NOTE — PROGRESS NOTES
Physical Therapy Daily Progress Note    Patient: Lynda Patterson   : 1940  Diagnosis/ICD-10 Code:  Chronic left-sided low back pain with left-sided sciatica [M54.42, G89.29]  Referring practitioner: Cabrera Hanna MD  Date of Initial Visit: Type: THERAPY  Noted: 2019  Today's Date: 2019  Patient seen for 25 sessions         Lynda Patterson reports: that her hip has been dong pretty well. Her hip is still bothering her sometimes but is better. She is having some tenderness in the back of hip today and she feels very stiff this morning.       Subjective     Objective   See Exercise, Manual, and Modality Logs for complete treatment.       Assessment/Plan    Progress per Plan of Care           Manual Therapy:    15     mins  32795;  Therapeutic Exercise:     4    mins  59515;     Neuromuscular Deana:        mins  25341;    Therapeutic Activity:     11     mins  07609;     Gait Training:           mins  35330;     Ultrasound:          mins  58334;    Electrical Stimulation:         mins  65408 ( );  Dry Needling          mins self-pay    Timed Treatment:   30   mins   Total Treatment:     30   mins    Lila Cordon PT  Physical Therapist

## 2019-06-24 ENCOUNTER — OFFICE VISIT (OUTPATIENT)
Dept: PHYSICAL THERAPY | Facility: CLINIC | Age: 79
End: 2019-06-24

## 2019-06-24 DIAGNOSIS — G83.10 MONOPLEGIA OF LOWER EXTREMITY, UNSPECIFIED ETIOLOGY, UNSPECIFIED LATERALITY (HCC): ICD-10-CM

## 2019-06-24 DIAGNOSIS — G89.29 CHRONIC LEFT-SIDED LOW BACK PAIN WITH LEFT-SIDED SCIATICA: Primary | ICD-10-CM

## 2019-06-24 DIAGNOSIS — M54.42 CHRONIC LEFT-SIDED LOW BACK PAIN WITH LEFT-SIDED SCIATICA: Primary | ICD-10-CM

## 2019-06-24 PROCEDURE — 97530 THERAPEUTIC ACTIVITIES: CPT | Performed by: PHYSICAL THERAPIST

## 2019-06-24 PROCEDURE — 97140 MANUAL THERAPY 1/> REGIONS: CPT | Performed by: PHYSICAL THERAPIST

## 2019-06-24 NOTE — PROGRESS NOTES
Physical Therapy Daily Progress Note      Patient: Lynda Patterson   : 1940  Diagnosis/ICD-10 Code:  Chronic left-sided low back pain with left-sided sciatica [M54.42, G89.29]  Referring practitioner: Cabrera Hanna MD  Date of Initial Visit: Type: THERAPY  Noted: 2019  Today's Date: 2019  Patient seen for 2 sessions         Lynda Patterson reports: that her hip and leg are doing better. She feels like her leg is getting stronger. She is stiff this morning. Her back has been hurting some.       Objective   See Exercise, Manual, and Modality Logs for complete treatment.       Assessment & Plan     Assessment  Assessment details: Pt continues to have difficulty with bed mobility and requires extra time to move from supine to sit and roll. Pt demonstrates difficulty with lateral ambulation with added high knees with increased LOB noted. Pt demonstrates fatigue by end of session.         Progress per Plan of Care           Manual Therapy:    15     mins  93889;  Therapeutic Exercise:         mins  98520;     Neuromuscular Deana:        mins  93657;    Therapeutic Activity:     15     mins  15441;     Gait Training:           mins  81515;     Ultrasound:          mins  91522;    Electrical Stimulation:         mins  40262 ( );  Dry Needling          mins self-pay    Timed Treatment:   30   mins   Total Treatment:     30   mins    Lila Cordon PT  Physical Therapist

## 2019-07-01 ENCOUNTER — OFFICE VISIT (OUTPATIENT)
Dept: PHYSICAL THERAPY | Facility: CLINIC | Age: 79
End: 2019-07-01

## 2019-07-01 DIAGNOSIS — M54.42 CHRONIC LEFT-SIDED LOW BACK PAIN WITH LEFT-SIDED SCIATICA: Primary | ICD-10-CM

## 2019-07-01 DIAGNOSIS — G89.29 CHRONIC LEFT-SIDED LOW BACK PAIN WITH LEFT-SIDED SCIATICA: Primary | ICD-10-CM

## 2019-07-01 DIAGNOSIS — G83.10 MONOPLEGIA OF LOWER EXTREMITY, UNSPECIFIED ETIOLOGY, UNSPECIFIED LATERALITY (HCC): ICD-10-CM

## 2019-07-01 PROCEDURE — 97140 MANUAL THERAPY 1/> REGIONS: CPT | Performed by: PHYSICAL THERAPIST

## 2019-07-01 PROCEDURE — 97530 THERAPEUTIC ACTIVITIES: CPT | Performed by: PHYSICAL THERAPIST

## 2019-07-01 NOTE — PROGRESS NOTES
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 Pt reports that she did miss her rolling chair at desk the other day and fell to floor. She was unable to get up and had to crawl to bed to pull up on it. She feels comfortable with d/c with HEP at this time.       Objective   See Exercise, Manual, and Modality Logs for complete treatment.       Assessment & Plan     Assessment  Assessment details: Pt demonstrates improved overall core stabilization and strength in LE's. Pt continues to have difficulty with standing from lower surfaces, however is able to stand from lowered mat table without difficulty. Pt encouraged to continue practicing at home. Pt encouraged to get a new chair for desk that doesn't have wheels to prevent further falls. Pt also encouraged to call or return to PT if she has any questions or concerns. Modified Oswestry: 40% impairment.         Other : Discharge in two weeks if pt does not return for further treatment          Manual Therapy:    15     mins  06848;  Therapeutic Exercise:         mins  86426;     Neuromuscular Deana:        mins  23542;    Therapeutic Activity:     15     mins  86517;     Gait Training:           mins  80191;     Ultrasound:          mins  65492;    Electrical Stimulation:         mins  01459 ( );  Dry Needling          mins self-pay    Timed Treatment:   30   mins   Total Treatment:     30   mins    Lila Cordon, PT  Physical Therapist

## 2019-07-09 ENCOUNTER — OFFICE VISIT (OUTPATIENT)
Dept: FAMILY MEDICINE CLINIC | Facility: CLINIC | Age: 79
End: 2019-07-09

## 2019-07-09 ENCOUNTER — LAB (OUTPATIENT)
Dept: FAMILY MEDICINE CLINIC | Facility: CLINIC | Age: 79
End: 2019-07-09

## 2019-07-09 VITALS
OXYGEN SATURATION: 98 % | WEIGHT: 191 LBS | HEIGHT: 61 IN | TEMPERATURE: 97.6 F | HEART RATE: 62 BPM | BODY MASS INDEX: 36.06 KG/M2 | DIASTOLIC BLOOD PRESSURE: 82 MMHG | SYSTOLIC BLOOD PRESSURE: 142 MMHG

## 2019-07-09 DIAGNOSIS — N18.30 TYPE 2 DIABETES MELLITUS WITH STAGE 3 CHRONIC KIDNEY DISEASE, WITHOUT LONG-TERM CURRENT USE OF INSULIN (HCC): ICD-10-CM

## 2019-07-09 DIAGNOSIS — E11.22 TYPE 2 DIABETES MELLITUS WITH STAGE 3 CHRONIC KIDNEY DISEASE, WITHOUT LONG-TERM CURRENT USE OF INSULIN (HCC): ICD-10-CM

## 2019-07-09 DIAGNOSIS — E78.2 MIXED HYPERLIPIDEMIA: Primary | ICD-10-CM

## 2019-07-09 DIAGNOSIS — D53.9 MACROCYTIC ANEMIA: ICD-10-CM

## 2019-07-09 DIAGNOSIS — R53.83 FATIGUE, UNSPECIFIED TYPE: ICD-10-CM

## 2019-07-09 DIAGNOSIS — N30.01 ACUTE CYSTITIS WITH HEMATURIA: ICD-10-CM

## 2019-07-09 DIAGNOSIS — R30.0 DYSURIA: ICD-10-CM

## 2019-07-09 DIAGNOSIS — E78.2 MIXED HYPERLIPIDEMIA: ICD-10-CM

## 2019-07-09 PROBLEM — K21.9 GASTROESOPHAGEAL REFLUX DISEASE: Status: ACTIVE | Noted: 2019-01-08

## 2019-07-09 PROBLEM — B02.9 SHINGLES: Status: ACTIVE | Noted: 2017-03-08

## 2019-07-09 PROBLEM — M19.90 OSTEOARTHRITIS: Status: ACTIVE | Noted: 2019-01-08

## 2019-07-09 PROBLEM — I65.29 CAROTID ARTERY STENOSIS: Status: ACTIVE | Noted: 2018-07-17

## 2019-07-09 PROBLEM — I10 HYPERTENSION: Status: ACTIVE | Noted: 2019-07-09

## 2019-07-09 PROBLEM — M10.9 GOUT: Status: ACTIVE | Noted: 2019-07-09

## 2019-07-09 PROBLEM — B02.29 POSTHERPETIC NEURALGIA: Status: ACTIVE | Noted: 2017-07-11

## 2019-07-09 PROBLEM — F41.9 ANXIETY DISORDER: Status: ACTIVE | Noted: 2019-07-09

## 2019-07-09 PROBLEM — E11.8 DISORDER ASSOCIATED WITH TYPE 2 DIABETES MELLITUS (HCC): Status: ACTIVE | Noted: 2017-01-11

## 2019-07-09 PROBLEM — F32.A DEPRESSION: Status: ACTIVE | Noted: 2019-07-09

## 2019-07-09 LAB
ALBUMIN SERPL-MCNC: 3.8 G/DL (ref 3.5–4.8)
ALBUMIN/GLOB SERPL: 1 G/DL (ref 1–1.7)
ALP SERPL-CCNC: 71 U/L (ref 32–91)
ALT SERPL W P-5'-P-CCNC: 17 U/L (ref 14–54)
ANION GAP SERPL CALCULATED.3IONS-SCNC: 15.2 MMOL/L (ref 5–15)
ARTICHOKE IGE QN: 119 MG/DL (ref 0–100)
AST SERPL-CCNC: 18 U/L (ref 15–41)
BACTERIA UR QL AUTO: ABNORMAL /HPF
BASOPHILS # BLD AUTO: 0.1 10*3/MM3 (ref 0–0.2)
BASOPHILS NFR BLD AUTO: 0.6 % (ref 0–1.5)
BILIRUB BLD-MCNC: NEGATIVE MG/DL
BILIRUB SERPL-MCNC: 0.9 MG/DL (ref 0.3–1.2)
BILIRUB UR QL STRIP: NEGATIVE
BUN BLD-MCNC: 34 MG/DL (ref 8–20)
BUN/CREAT SERPL: 24.3 (ref 5.4–26.2)
CALCIUM SPEC-SCNC: 9.2 MG/DL (ref 8.9–10.3)
CHLORIDE SERPL-SCNC: 107 MMOL/L (ref 101–111)
CHOLEST SERPL-MCNC: 181 MG/DL
CLARITY UR: ABNORMAL
CLARITY, POC: CLEAR
CO2 SERPL-SCNC: 21 MMOL/L (ref 22–32)
COLOR UR: YELLOW
COLOR UR: YELLOW
CREAT BLD-MCNC: 1.4 MG/DL (ref 0.4–1)
DEPRECATED RDW RBC AUTO: 48.6 FL (ref 37–54)
EOSINOPHIL # BLD AUTO: 0.5 10*3/MM3 (ref 0–0.4)
EOSINOPHIL NFR BLD AUTO: 4.8 % (ref 0.3–6.2)
ERYTHROCYTE [DISTWIDTH] IN BLOOD BY AUTOMATED COUNT: 14.8 % (ref 12.3–15.4)
GFR SERPL CREATININE-BSD FRML MDRD: 36 ML/MIN/1.73
GLOBULIN UR ELPH-MCNC: 3.8 GM/DL (ref 2.5–3.8)
GLUCOSE BLD-MCNC: 120 MG/DL (ref 65–99)
GLUCOSE UR STRIP-MCNC: NEGATIVE MG/DL
GLUCOSE UR STRIP-MCNC: NEGATIVE MG/DL
HBA1C MFR BLD: 6.2 % (ref 3.5–5.6)
HCT VFR BLD AUTO: 36 % (ref 34–46.6)
HDLC SERPL QL: 3.35
HDLC SERPL-MCNC: 54 MG/DL
HGB BLD-MCNC: 11.9 G/DL (ref 12–15.9)
HGB UR QL STRIP.AUTO: ABNORMAL
HYALINE CASTS UR QL AUTO: ABNORMAL /LPF
KETONES UR QL STRIP: NEGATIVE
KETONES UR QL: NEGATIVE
LDLC/HDLC SERPL: 2.03 {RATIO}
LEUKOCYTE EST, POC: ABNORMAL
LEUKOCYTE ESTERASE UR QL STRIP.AUTO: ABNORMAL
LYMPHOCYTES # BLD AUTO: 2.1 10*3/MM3 (ref 0.7–3.1)
LYMPHOCYTES NFR BLD AUTO: 22.1 % (ref 19.6–45.3)
MCH RBC QN AUTO: 31.2 PG (ref 26.6–33)
MCHC RBC AUTO-ENTMCNC: 33 G/DL (ref 31.5–35.7)
MCV RBC AUTO: 94.3 FL (ref 79–97)
MONOCYTES # BLD AUTO: 1 10*3/MM3 (ref 0.1–0.9)
MONOCYTES NFR BLD AUTO: 10.7 % (ref 5–12)
NEUTROPHILS # BLD AUTO: 5.9 10*3/MM3 (ref 1.7–7)
NEUTROPHILS NFR BLD AUTO: 61.8 % (ref 42.7–76)
NITRITE UR QL STRIP: POSITIVE
NITRITE UR-MCNC: POSITIVE MG/ML
NRBC BLD AUTO-RTO: 0.1 /100 WBC (ref 0–0.2)
PH UR STRIP.AUTO: 5.5 [PH] (ref 5–8)
PH UR: 5.5 [PH] (ref 5–8)
PLATELET # BLD AUTO: 254 10*3/MM3 (ref 140–450)
PMV BLD AUTO: 7.9 FL (ref 6–12)
POTASSIUM BLD-SCNC: 5.2 MMOL/L (ref 3.6–5.1)
PROT SERPL-MCNC: 7.6 G/DL (ref 6.1–7.9)
PROT UR QL STRIP: ABNORMAL
PROT UR STRIP-MCNC: NEGATIVE MG/DL
RBC # BLD AUTO: 3.82 10*6/MM3 (ref 3.77–5.28)
RBC # UR STRIP: ABNORMAL /UL
RBC # UR: ABNORMAL /HPF
REF LAB TEST METHOD: ABNORMAL
SODIUM BLD-SCNC: 138 MMOL/L (ref 136–144)
SP GR UR STRIP: 1.02 (ref 1–1.03)
SP GR UR: 1.01 (ref 1–1.03)
SQUAMOUS #/AREA URNS HPF: ABNORMAL /HPF
TRIGL SERPL-MCNC: 87 MG/DL
TSH SERPL DL<=0.05 MIU/L-ACNC: 2.3 MIU/ML (ref 0.34–5.6)
UROBILINOGEN UR QL STRIP: ABNORMAL
UROBILINOGEN UR QL: NORMAL
VLDLC SERPL-MCNC: 17.4 MG/DL
WBC NRBC COR # BLD: 9.5 10*3/MM3 (ref 3.4–10.8)
WBC UR QL AUTO: ABNORMAL /HPF

## 2019-07-09 PROCEDURE — 83036 HEMOGLOBIN GLYCOSYLATED A1C: CPT | Performed by: FAMILY MEDICINE

## 2019-07-09 PROCEDURE — 80053 COMPREHEN METABOLIC PANEL: CPT | Performed by: FAMILY MEDICINE

## 2019-07-09 PROCEDURE — 36415 COLL VENOUS BLD VENIPUNCTURE: CPT | Performed by: FAMILY MEDICINE

## 2019-07-09 PROCEDURE — 81001 URINALYSIS AUTO W/SCOPE: CPT | Performed by: FAMILY MEDICINE

## 2019-07-09 PROCEDURE — 87086 URINE CULTURE/COLONY COUNT: CPT | Performed by: FAMILY MEDICINE

## 2019-07-09 PROCEDURE — 85025 COMPLETE CBC W/AUTO DIFF WBC: CPT | Performed by: FAMILY MEDICINE

## 2019-07-09 PROCEDURE — 84443 ASSAY THYROID STIM HORMONE: CPT | Performed by: FAMILY MEDICINE

## 2019-07-09 PROCEDURE — 99214 OFFICE O/P EST MOD 30 MIN: CPT | Performed by: FAMILY MEDICINE

## 2019-07-09 PROCEDURE — 80061 LIPID PANEL: CPT | Performed by: FAMILY MEDICINE

## 2019-07-09 PROCEDURE — 87186 SC STD MICRODIL/AGAR DIL: CPT | Performed by: FAMILY MEDICINE

## 2019-07-09 PROCEDURE — 81003 URINALYSIS AUTO W/O SCOPE: CPT | Performed by: FAMILY MEDICINE

## 2019-07-09 RX ORDER — ALLOPURINOL 100 MG/1
TABLET ORAL
COMMUNITY
Start: 2019-04-17 | End: 2019-07-13 | Stop reason: SDUPTHER

## 2019-07-09 RX ORDER — PHENAZOPYRIDINE HYDROCHLORIDE 200 MG/1
TABLET, FILM COATED ORAL
COMMUNITY
Start: 2019-02-05 | End: 2020-01-09

## 2019-07-09 RX ORDER — GABAPENTIN 300 MG/1
CAPSULE ORAL
COMMUNITY
Start: 2019-05-23 | End: 2019-08-29 | Stop reason: SDUPTHER

## 2019-07-09 RX ORDER — GLIMEPIRIDE 2 MG/1
TABLET ORAL
COMMUNITY
Start: 2019-04-17 | End: 2019-07-16 | Stop reason: SDUPTHER

## 2019-07-09 RX ORDER — LISINOPRIL 20 MG/1
TABLET ORAL
COMMUNITY
Start: 2019-04-17 | End: 2019-10-12 | Stop reason: SDUPTHER

## 2019-07-09 RX ORDER — NITROFURANTOIN 25; 75 MG/1; MG/1
100 CAPSULE ORAL 2 TIMES DAILY
Qty: 14 CAPSULE | Refills: 0 | Status: SHIPPED | OUTPATIENT
Start: 2019-07-09 | End: 2019-07-16

## 2019-07-09 RX ORDER — LUTEIN 6 MG
TABLET ORAL
COMMUNITY
Start: 2013-11-07 | End: 2022-07-22

## 2019-07-09 RX ORDER — SERTRALINE HYDROCHLORIDE 100 MG/1
TABLET, FILM COATED ORAL
COMMUNITY
Start: 2019-04-17 | End: 2020-01-11

## 2019-07-09 RX ORDER — VIT A/VIT C/VIT E/ZINC/COPPER 7160-113
TABLET, DELAYED RELEASE (ENTERIC COATED) ORAL
COMMUNITY
Start: 2013-11-07 | End: 2023-02-20

## 2019-07-09 RX ORDER — ATORVASTATIN CALCIUM 10 MG/1
TABLET, FILM COATED ORAL
COMMUNITY
Start: 2019-05-20 | End: 2019-07-13 | Stop reason: SDUPTHER

## 2019-07-09 NOTE — PROGRESS NOTES
Subjective   Lynda Patterson is a 79 y.o. female.     Comes in for follow up on bp, chol, dm, and macrocytic anemia  Last eye exam 2/25/19  Has ckd  Has lost 5 pounds since Feb  Dysuria and incr freq for 3 weeks  bs running 140-270         The following portions of the patient's history were reviewed and updated as appropriate: allergies, current medications, past family history, past medical history, past social history, past surgical history and problem list.  Past Medical History:   Diagnosis Date   • Anxiety    • Depression    • DM2 (diabetes mellitus, type 2) (CMS/[facility])    • Gout    • Heart disease    • Hyperlipidemia    • Hypertension    • Kidney failure     Stage three kidney failure , abstraction from [facility]   • Macular degeneration      Past Surgical History:   Procedure Laterality Date   • CHOLECYSTECTOMY  1988   • TOTAL ABDOMINAL HYSTERECTOMY  1970     History reviewed. No pertinent family history.  Social History     Socioeconomic History   • Marital status:      Spouse name: Not on file   • Number of children: Not on file   • Years of education: Not on file   • Highest education level: Not on file   Tobacco Use   • Smoking status: Never Smoker   • Smokeless tobacco: Never Used   Substance and Sexual Activity   • Alcohol use: No     Frequency: Never   • Drug use: No         Current Outpatient Medications:   •  glucose blood (ONE TOUCH ULTRA TEST) test strip, ONETOUCH ULTRA BLUE STRP, Disp: , Rfl:   •  Lutein 20 MG tablet, LUTEIN 20 MG TABS, Disp: , Rfl:   •  Multiple Vitamins-Minerals (ICAPS) tablet, ICAPS AREDS FORMULA TABS, Disp: , Rfl:   •  ONE TOUCH LANCETS misc, ONETOUCH LANCETS, Disp: , Rfl:   •  phenazopyridine (PYRIDIUM) 200 MG tablet, PYRIDIUM 200 MG TABS, Disp: , Rfl:   •  allopurinol (ZYLOPRIM) 100 MG tablet, , Disp: , Rfl:   •  atorvastatin (LIPITOR) 10 MG tablet, , Disp: , Rfl:   •  gabapentin (NEURONTIN) 300 MG capsule, , Disp: , Rfl:   •  glimepiride (AMARYL) 2 MG tablet,  ", Disp: , Rfl:   •  lisinopril (PRINIVIL,ZESTRIL) 20 MG tablet, , Disp: , Rfl:   •  nitrofurantoin, macrocrystal-monohydrate, (MACROBID) 100 MG capsule, Take 1 capsule by mouth 2 (Two) Times a Day for 7 days., Disp: 14 capsule, Rfl: 0  •  sertraline (ZOLOFT) 100 MG tablet, , Disp: , Rfl:     Review of Systems   Constitutional: Positive for fatigue. Negative for diaphoresis, fever, unexpected weight gain and unexpected weight loss.   Respiratory: Negative for cough, chest tightness and shortness of breath.    Cardiovascular: Negative for chest pain, palpitations and leg swelling.   Gastrointestinal: Negative for abdominal pain, nausea and vomiting.   Endocrine: Positive for polyuria.   Genitourinary: Positive for dysuria and frequency. Negative for flank pain, hematuria and pelvic pain.   Neurological: Negative for dizziness, syncope and headache.   Hematological: Negative for adenopathy. Does not bruise/bleed easily.     /82 (BP Location: Left arm, Patient Position: Sitting, Cuff Size: Adult)   Pulse 62   Temp 97.6 °F (36.4 °C) (Oral)   Ht 154.9 cm (61\")   Wt 86.6 kg (191 lb)   SpO2 98%   BMI 36.09 kg/m²       Objective   Physical Exam   Constitutional: Vital signs are normal. She appears well-developed and well-nourished. No distress. She is obese.  HENT:   Head: Normocephalic and atraumatic.   Neck: Neck supple. No JVD present. No thyromegaly present.   Cardiovascular: Normal rate, regular rhythm, normal heart sounds and intact distal pulses. Exam reveals no gallop and no friction rub.   No murmur heard.  Pulmonary/Chest: Effort normal and breath sounds normal. No respiratory distress. She has no wheezes. She has no rales.   Abdominal: Soft. Bowel sounds are normal. There is no tenderness. There is no CVA tenderness.   Musculoskeletal: She exhibits edema (trace).   Lymphadenopathy:     She has no cervical adenopathy.   Neurological: She is alert.   Skin: Skin is warm and dry.   Psychiatric: She has a " normal mood and affect.   Nursing note and vitals reviewed.      Brief Urine Lab Results  (Last result in the past 365 days)      Color   Clarity   Blood   Leuk Est   Nitrite   Protein   CREAT   Urine HCG        07/09/19 1452 Yellow Turbid  Comment:  Result checked  Moderate (2+) Large (3+) Positive 100 mg/dL (2+)               Assessment/Plan   Problems Addressed this Visit        Cardiovascular and Mediastinum    Mixed hyperlipidemia - Primary    Relevant Medications    atorvastatin (LIPITOR) 10 MG tablet    Other Relevant Orders    Comprehensive metabolic panel (Completed)    Lipid panel (Completed)       Endocrine    Type 2 diabetes mellitus with stage 3 chronic kidney disease, without long-term current use of insulin (CMS/[facility])    Relevant Medications    glimepiride (AMARYL) 2 MG tablet    Other Relevant Orders    CBC w AUTO Differential (Completed)    Comprehensive metabolic panel (Completed)    Hemoglobin A1c (Completed)       Hematopoietic and Hemostatic    Macrocytic anemia    Relevant Orders    CBC w AUTO Differential (Completed)       Other    Fatigue    Relevant Orders    CBC w AUTO Differential (Completed)    TSH (Completed)    Comprehensive metabolic panel (Completed)      Other Visit Diagnoses     Acute cystitis with hematuria        rx macrobid  all to sulfa, pcn, cipro  send for culture    Relevant Medications    phenazopyridine (PYRIDIUM) 200 MG tablet    nitrofurantoin, macrocrystal-monohydrate, (MACROBID) 100 MG capsule    Other Relevant Orders    POCT urinalysis dipstick, automated (Completed)    Urine Culture - Urine, Urine, Clean Catch    Dysuria        Relevant Orders    Urinalysis With Culture If Indicated - Urine, Clean Catch (Completed)    POCT urinalysis dipstick, automated    Urinalysis, Microscopic Only - Urine, Clean Catch (Completed)    Urine Culture - Urine,

## 2019-07-09 NOTE — PATIENT INSTRUCTIONS
You need to avoid fried foods and limit pasta, bread, and sweets.   Keep working to lose weight through healthy eating and exercise.

## 2019-07-11 LAB — BACTERIA SPEC AEROBE CULT: ABNORMAL

## 2019-07-14 RX ORDER — ATORVASTATIN CALCIUM 10 MG/1
TABLET, FILM COATED ORAL
Qty: 57 TABLET | Refills: 0 | Status: SHIPPED | OUTPATIENT
Start: 2019-07-14 | End: 2021-04-29

## 2019-07-14 RX ORDER — ALLOPURINOL 100 MG/1
TABLET ORAL
Qty: 90 TABLET | Refills: 0 | Status: SHIPPED | OUTPATIENT
Start: 2019-07-14 | End: 2019-10-12 | Stop reason: SDUPTHER

## 2019-07-16 RX ORDER — GLIMEPIRIDE 2 MG/1
TABLET ORAL
Qty: 180 TABLET | Refills: 3 | Status: SHIPPED | OUTPATIENT
Start: 2019-07-16 | End: 2020-07-08

## 2019-08-29 RX ORDER — GABAPENTIN 300 MG/1
CAPSULE ORAL
Qty: 90 CAPSULE | Refills: 2 | Status: SHIPPED | OUTPATIENT
Start: 2019-08-29 | End: 2020-06-27

## 2019-10-01 ENCOUNTER — DOCUMENTATION (OUTPATIENT)
Dept: PHYSICAL THERAPY | Facility: CLINIC | Age: 79
End: 2019-10-01

## 2019-10-01 NOTE — PROGRESS NOTES
Discharge Summary  Discharge Summary from Physical Therapy Report        Discharge Status of Patient: Pt discharged with HEP on 7/1/2019    Goals: All Met    Discharge Plan: Continue with current home exercise program as instructed    Comments: Pt demonstrated improved overall strength as well as min pain. Pt was independent with home exercises. Pt encouraged to call if she had any questions or concerns.     Date of Discharge 10/1/2019      PT SIGNATURE: Lila Cordon PT, DPT, OCS           [place] License # [id number]

## 2019-10-12 RX ORDER — LISINOPRIL 20 MG/1
TABLET ORAL
Qty: 90 TABLET | Refills: 2 | Status: SHIPPED | OUTPATIENT
Start: 2019-10-12 | End: 2020-07-08

## 2019-10-12 RX ORDER — ALLOPURINOL 100 MG/1
TABLET ORAL
Qty: 90 TABLET | Refills: 2 | Status: SHIPPED | OUTPATIENT
Start: 2019-10-12 | End: 2020-01-09

## 2019-10-30 ENCOUNTER — TELEPHONE (OUTPATIENT)
Dept: FAMILY MEDICINE CLINIC | Facility: CLINIC | Age: 79
End: 2019-10-30

## 2019-11-12 ENCOUNTER — TRANSCRIBE ORDERS (OUTPATIENT)
Dept: ADMINISTRATIVE | Facility: HOSPITAL | Age: 79
End: 2019-11-12

## 2019-11-12 ENCOUNTER — LAB (OUTPATIENT)
Dept: LAB | Facility: HOSPITAL | Age: 79
End: 2019-11-12

## 2019-11-12 DIAGNOSIS — N18.30 CHRONIC KIDNEY DISEASE, STAGE III (MODERATE) (HCC): Primary | ICD-10-CM

## 2019-11-12 DIAGNOSIS — N18.30 CHRONIC KIDNEY DISEASE, STAGE III (MODERATE) (HCC): ICD-10-CM

## 2019-11-12 LAB
ANION GAP SERPL CALCULATED.3IONS-SCNC: 15.5 MMOL/L (ref 5–15)
BASOPHILS # BLD AUTO: 0.03 10*3/MM3 (ref 0–0.2)
BASOPHILS NFR BLD AUTO: 0.4 % (ref 0–1.5)
BILIRUB UR QL STRIP: NEGATIVE
BUN BLD-MCNC: 39 MG/DL (ref 8–23)
BUN/CREAT SERPL: 28.1 (ref 7–25)
CALCIUM SPEC-SCNC: 9.3 MG/DL (ref 8.6–10.5)
CHLORIDE SERPL-SCNC: 104 MMOL/L (ref 98–107)
CLARITY UR: ABNORMAL
CO2 SERPL-SCNC: 22.5 MMOL/L (ref 22–29)
COLOR UR: YELLOW
CREAT BLD-MCNC: 1.39 MG/DL (ref 0.57–1)
DEPRECATED RDW RBC AUTO: 44.6 FL (ref 37–54)
EOSINOPHIL # BLD AUTO: 0.41 10*3/MM3 (ref 0–0.4)
EOSINOPHIL NFR BLD AUTO: 5.8 % (ref 0.3–6.2)
ERYTHROCYTE [DISTWIDTH] IN BLOOD BY AUTOMATED COUNT: 12.8 % (ref 12.3–15.4)
GFR SERPL CREATININE-BSD FRML MDRD: 36 ML/MIN/1.73
GLUCOSE BLD-MCNC: 168 MG/DL (ref 65–99)
GLUCOSE UR STRIP-MCNC: NEGATIVE MG/DL
HCT VFR BLD AUTO: 37.2 % (ref 34–46.6)
HGB BLD-MCNC: 12.1 G/DL (ref 12–15.9)
HGB UR QL STRIP.AUTO: NEGATIVE
IMM GRANULOCYTES # BLD AUTO: 0.01 10*3/MM3 (ref 0–0.05)
IMM GRANULOCYTES NFR BLD AUTO: 0.1 % (ref 0–0.5)
KETONES UR QL STRIP: NEGATIVE
LEUKOCYTE ESTERASE UR QL STRIP.AUTO: NEGATIVE
LYMPHOCYTES # BLD AUTO: 2.07 10*3/MM3 (ref 0.7–3.1)
LYMPHOCYTES NFR BLD AUTO: 29.4 % (ref 19.6–45.3)
MCH RBC QN AUTO: 30.6 PG (ref 26.6–33)
MCHC RBC AUTO-ENTMCNC: 32.5 G/DL (ref 31.5–35.7)
MCV RBC AUTO: 93.9 FL (ref 79–97)
MONOCYTES # BLD AUTO: 0.65 10*3/MM3 (ref 0.1–0.9)
MONOCYTES NFR BLD AUTO: 9.2 % (ref 5–12)
NEUTROPHILS # BLD AUTO: 3.88 10*3/MM3 (ref 1.7–7)
NEUTROPHILS NFR BLD AUTO: 55.1 % (ref 42.7–76)
NITRITE UR QL STRIP: NEGATIVE
NRBC BLD AUTO-RTO: 0 /100 WBC (ref 0–0.2)
PH UR STRIP.AUTO: 6 [PH] (ref 5–8)
PLATELET # BLD AUTO: 242 10*3/MM3 (ref 140–450)
PMV BLD AUTO: 10.4 FL (ref 6–12)
POTASSIUM BLD-SCNC: 4.9 MMOL/L (ref 3.5–5.2)
PROT UR QL STRIP: NEGATIVE
RBC # BLD AUTO: 3.96 10*6/MM3 (ref 3.77–5.28)
SODIUM BLD-SCNC: 142 MMOL/L (ref 136–145)
SP GR UR STRIP: 1.02 (ref 1–1.03)
UROBILINOGEN UR QL STRIP: ABNORMAL
WBC NRBC COR # BLD: 7.05 10*3/MM3 (ref 3.4–10.8)

## 2019-11-12 PROCEDURE — 85025 COMPLETE CBC W/AUTO DIFF WBC: CPT

## 2019-11-12 PROCEDURE — 36415 COLL VENOUS BLD VENIPUNCTURE: CPT

## 2019-11-12 PROCEDURE — 80048 BASIC METABOLIC PNL TOTAL CA: CPT

## 2019-11-12 PROCEDURE — 81003 URINALYSIS AUTO W/O SCOPE: CPT

## 2020-01-09 ENCOUNTER — HOSPITAL ENCOUNTER (OUTPATIENT)
Dept: GENERAL RADIOLOGY | Facility: HOSPITAL | Age: 80
Discharge: HOME | End: 2020-01-09

## 2020-01-09 ENCOUNTER — HOSPITAL ENCOUNTER (OUTPATIENT)
Dept: GENERAL RADIOLOGY | Facility: HOSPITAL | Age: 80
Discharge: HOME | End: 2020-01-09
Admitting: FAMILY MEDICINE

## 2020-01-09 ENCOUNTER — LAB (OUTPATIENT)
Dept: FAMILY MEDICINE CLINIC | Facility: CLINIC | Age: 80
End: 2020-01-09

## 2020-01-09 ENCOUNTER — OFFICE VISIT (OUTPATIENT)
Dept: FAMILY MEDICINE CLINIC | Facility: CLINIC | Age: 80
End: 2020-01-09

## 2020-01-09 VITALS
SYSTOLIC BLOOD PRESSURE: 127 MMHG | HEART RATE: 57 BPM | DIASTOLIC BLOOD PRESSURE: 77 MMHG | BODY MASS INDEX: 36.25 KG/M2 | HEIGHT: 61 IN | OXYGEN SATURATION: 97 % | WEIGHT: 192 LBS

## 2020-01-09 DIAGNOSIS — M54.2 NECK PAIN: ICD-10-CM

## 2020-01-09 DIAGNOSIS — M54.50 LUMBAR BACK PAIN: ICD-10-CM

## 2020-01-09 DIAGNOSIS — E78.2 MIXED HYPERLIPIDEMIA: ICD-10-CM

## 2020-01-09 DIAGNOSIS — E11.22 TYPE 2 DIABETES MELLITUS WITH STAGE 3 CHRONIC KIDNEY DISEASE, WITHOUT LONG-TERM CURRENT USE OF INSULIN (HCC): ICD-10-CM

## 2020-01-09 DIAGNOSIS — I10 ESSENTIAL HYPERTENSION: ICD-10-CM

## 2020-01-09 DIAGNOSIS — R53.83 FATIGUE, UNSPECIFIED TYPE: ICD-10-CM

## 2020-01-09 DIAGNOSIS — N18.30 TYPE 2 DIABETES MELLITUS WITH STAGE 3 CHRONIC KIDNEY DISEASE, WITHOUT LONG-TERM CURRENT USE OF INSULIN (HCC): ICD-10-CM

## 2020-01-09 DIAGNOSIS — E78.2 MIXED HYPERLIPIDEMIA: Primary | ICD-10-CM

## 2020-01-09 DIAGNOSIS — N18.30 CHRONIC KIDNEY DISEASE, STAGE III (MODERATE) (HCC): ICD-10-CM

## 2020-01-09 DIAGNOSIS — D53.9 MACROCYTIC ANEMIA: ICD-10-CM

## 2020-01-09 DIAGNOSIS — M54.6 MIDLINE THORACIC BACK PAIN, UNSPECIFIED CHRONICITY: ICD-10-CM

## 2020-01-09 LAB
ALBUMIN SERPL-MCNC: 4.2 G/DL (ref 3.5–5.2)
ALBUMIN/GLOB SERPL: 1.2 G/DL
ALP SERPL-CCNC: 77 U/L (ref 39–117)
ALT SERPL W P-5'-P-CCNC: 13 U/L (ref 1–33)
ANION GAP SERPL CALCULATED.3IONS-SCNC: 13.7 MMOL/L (ref 5–15)
AST SERPL-CCNC: 21 U/L (ref 1–32)
BASOPHILS # BLD AUTO: 0.04 10*3/MM3 (ref 0–0.2)
BASOPHILS NFR BLD AUTO: 0.4 % (ref 0–1.5)
BILIRUB SERPL-MCNC: 0.3 MG/DL (ref 0.2–1.2)
BUN BLD-MCNC: 46 MG/DL (ref 8–23)
BUN/CREAT SERPL: 27.1 (ref 7–25)
CALCIUM SPEC-SCNC: 9.2 MG/DL (ref 8.6–10.5)
CHLORIDE SERPL-SCNC: 101 MMOL/L (ref 98–107)
CHOLEST SERPL-MCNC: 299 MG/DL (ref 0–200)
CO2 SERPL-SCNC: 22.3 MMOL/L (ref 22–29)
CREAT BLD-MCNC: 1.7 MG/DL (ref 0.57–1)
DEPRECATED RDW RBC AUTO: 43.5 FL (ref 37–54)
EOSINOPHIL # BLD AUTO: 0.34 10*3/MM3 (ref 0–0.4)
EOSINOPHIL NFR BLD AUTO: 3.7 % (ref 0.3–6.2)
ERYTHROCYTE [DISTWIDTH] IN BLOOD BY AUTOMATED COUNT: 13.1 % (ref 12.3–15.4)
GFR SERPL CREATININE-BSD FRML MDRD: 29 ML/MIN/1.73
GLOBULIN UR ELPH-MCNC: 3.4 GM/DL
GLUCOSE BLD-MCNC: 102 MG/DL (ref 65–99)
HBA1C MFR BLD: 5.6 % (ref 3.5–5.6)
HCT VFR BLD AUTO: 35 % (ref 34–46.6)
HDLC SERPL-MCNC: 57 MG/DL (ref 40–60)
HGB BLD-MCNC: 11.9 G/DL (ref 12–15.9)
IMM GRANULOCYTES # BLD AUTO: 0.03 10*3/MM3 (ref 0–0.05)
IMM GRANULOCYTES NFR BLD AUTO: 0.3 % (ref 0–0.5)
LDLC SERPL CALC-MCNC: 211 MG/DL (ref 0–100)
LDLC/HDLC SERPL: 3.71 {RATIO}
LYMPHOCYTES # BLD AUTO: 1.82 10*3/MM3 (ref 0.7–3.1)
LYMPHOCYTES NFR BLD AUTO: 19.7 % (ref 19.6–45.3)
MCH RBC QN AUTO: 30.9 PG (ref 26.6–33)
MCHC RBC AUTO-ENTMCNC: 34 G/DL (ref 31.5–35.7)
MCV RBC AUTO: 90.9 FL (ref 79–97)
MONOCYTES # BLD AUTO: 0.88 10*3/MM3 (ref 0.1–0.9)
MONOCYTES NFR BLD AUTO: 9.5 % (ref 5–12)
NEUTROPHILS # BLD AUTO: 6.13 10*3/MM3 (ref 1.7–7)
NEUTROPHILS NFR BLD AUTO: 66.4 % (ref 42.7–76)
NRBC BLD AUTO-RTO: 0 /100 WBC (ref 0–0.2)
PLATELET # BLD AUTO: 253 10*3/MM3 (ref 140–450)
PMV BLD AUTO: 10.7 FL (ref 6–12)
POTASSIUM BLD-SCNC: 5.2 MMOL/L (ref 3.5–5.2)
PROT SERPL-MCNC: 7.6 G/DL (ref 6–8.5)
RBC # BLD AUTO: 3.85 10*6/MM3 (ref 3.77–5.28)
SODIUM BLD-SCNC: 137 MMOL/L (ref 136–145)
TRIGL SERPL-MCNC: 153 MG/DL (ref 0–150)
TSH SERPL DL<=0.05 MIU/L-ACNC: 2.65 UIU/ML (ref 0.27–4.2)
VLDLC SERPL-MCNC: 30.6 MG/DL (ref 5–40)
WBC NRBC COR # BLD: 9.24 10*3/MM3 (ref 3.4–10.8)

## 2020-01-09 PROCEDURE — 72040 X-RAY EXAM NECK SPINE 2-3 VW: CPT

## 2020-01-09 PROCEDURE — 72100 X-RAY EXAM L-S SPINE 2/3 VWS: CPT

## 2020-01-09 PROCEDURE — 85025 COMPLETE CBC W/AUTO DIFF WBC: CPT | Performed by: FAMILY MEDICINE

## 2020-01-09 PROCEDURE — 87186 SC STD MICRODIL/AGAR DIL: CPT | Performed by: FAMILY MEDICINE

## 2020-01-09 PROCEDURE — 80061 LIPID PANEL: CPT | Performed by: FAMILY MEDICINE

## 2020-01-09 PROCEDURE — 87088 URINE BACTERIA CULTURE: CPT | Performed by: FAMILY MEDICINE

## 2020-01-09 PROCEDURE — 72070 X-RAY EXAM THORAC SPINE 2VWS: CPT

## 2020-01-09 PROCEDURE — 99214 OFFICE O/P EST MOD 30 MIN: CPT | Performed by: FAMILY MEDICINE

## 2020-01-09 PROCEDURE — 80053 COMPREHEN METABOLIC PANEL: CPT | Performed by: FAMILY MEDICINE

## 2020-01-09 PROCEDURE — 84443 ASSAY THYROID STIM HORMONE: CPT | Performed by: FAMILY MEDICINE

## 2020-01-09 PROCEDURE — 36415 COLL VENOUS BLD VENIPUNCTURE: CPT | Performed by: FAMILY MEDICINE

## 2020-01-09 PROCEDURE — 87086 URINE CULTURE/COLONY COUNT: CPT | Performed by: FAMILY MEDICINE

## 2020-01-09 PROCEDURE — 83036 HEMOGLOBIN GLYCOSYLATED A1C: CPT | Performed by: FAMILY MEDICINE

## 2020-01-09 RX ORDER — B-COMPLEX WITH VITAMIN C
1 TABLET ORAL DAILY
COMMUNITY
Start: 2017-11-02 | End: 2022-07-22

## 2020-01-11 LAB — BACTERIA SPEC AEROBE CULT: ABNORMAL

## 2020-01-11 RX ORDER — SERTRALINE HYDROCHLORIDE 100 MG/1
TABLET, FILM COATED ORAL
Qty: 90 TABLET | Refills: 3 | Status: SHIPPED | OUTPATIENT
Start: 2020-01-11 | End: 2021-01-04

## 2020-02-10 PROBLEM — E11.22 TYPE 2 DIABETES MELLITUS WITH DIABETIC CHRONIC KIDNEY DISEASE: Status: ACTIVE | Noted: 2018-11-02

## 2020-06-27 RX ORDER — GABAPENTIN 300 MG/1
CAPSULE ORAL
Qty: 90 CAPSULE | Refills: 1 | Status: SHIPPED | OUTPATIENT
Start: 2020-06-27 | End: 2021-04-03

## 2020-07-08 RX ORDER — ALLOPURINOL 100 MG/1
TABLET ORAL
Qty: 90 TABLET | Refills: 1 | OUTPATIENT
Start: 2020-07-08

## 2020-07-08 RX ORDER — GLIMEPIRIDE 2 MG/1
TABLET ORAL
Qty: 180 TABLET | Refills: 1 | Status: SHIPPED | OUTPATIENT
Start: 2020-07-08 | End: 2021-01-04

## 2020-07-08 RX ORDER — LISINOPRIL 20 MG/1
TABLET ORAL
Qty: 90 TABLET | Refills: 1 | Status: SHIPPED | OUTPATIENT
Start: 2020-07-08 | End: 2021-01-04

## 2020-07-12 RX ORDER — ALLOPURINOL 100 MG/1
TABLET ORAL
Qty: 90 TABLET | Refills: 1 | OUTPATIENT
Start: 2020-07-12

## 2020-07-21 ENCOUNTER — LAB (OUTPATIENT)
Dept: LAB | Facility: HOSPITAL | Age: 80
End: 2020-07-21

## 2020-07-21 ENCOUNTER — TRANSCRIBE ORDERS (OUTPATIENT)
Dept: LAB | Facility: HOSPITAL | Age: 80
End: 2020-07-21

## 2020-07-21 DIAGNOSIS — N18.30 CHRONIC KIDNEY DISEASE, STAGE III (MODERATE) (HCC): ICD-10-CM

## 2020-07-21 DIAGNOSIS — N18.30 CHRONIC KIDNEY DISEASE, STAGE III (MODERATE) (HCC): Primary | ICD-10-CM

## 2020-07-21 LAB
ANION GAP SERPL CALCULATED.3IONS-SCNC: 14.8 MMOL/L (ref 5–15)
BASOPHILS # BLD AUTO: 0.05 10*3/MM3 (ref 0–0.2)
BASOPHILS NFR BLD AUTO: 0.8 % (ref 0–1.5)
BUN SERPL-MCNC: 36 MG/DL (ref 8–23)
BUN/CREAT SERPL: 25.5 (ref 7–25)
CALCIUM SPEC-SCNC: 9.2 MG/DL (ref 8.6–10.5)
CHLORIDE SERPL-SCNC: 103 MMOL/L (ref 98–107)
CO2 SERPL-SCNC: 20.2 MMOL/L (ref 22–29)
CREAT SERPL-MCNC: 1.41 MG/DL (ref 0.57–1)
DEPRECATED RDW RBC AUTO: 45.1 FL (ref 37–54)
EOSINOPHIL # BLD AUTO: 0.51 10*3/MM3 (ref 0–0.4)
EOSINOPHIL NFR BLD AUTO: 7.7 % (ref 0.3–6.2)
ERYTHROCYTE [DISTWIDTH] IN BLOOD BY AUTOMATED COUNT: 13.1 % (ref 12.3–15.4)
GFR SERPL CREATININE-BSD FRML MDRD: 36 ML/MIN/1.73
GLUCOSE SERPL-MCNC: 90 MG/DL (ref 65–99)
HCT VFR BLD AUTO: 38 % (ref 34–46.6)
HGB BLD-MCNC: 12.3 G/DL (ref 12–15.9)
IMM GRANULOCYTES # BLD AUTO: 0.01 10*3/MM3 (ref 0–0.05)
IMM GRANULOCYTES NFR BLD AUTO: 0.2 % (ref 0–0.5)
LYMPHOCYTES # BLD AUTO: 2.04 10*3/MM3 (ref 0.7–3.1)
LYMPHOCYTES NFR BLD AUTO: 30.6 % (ref 19.6–45.3)
MCH RBC QN AUTO: 30.3 PG (ref 26.6–33)
MCHC RBC AUTO-ENTMCNC: 32.4 G/DL (ref 31.5–35.7)
MCV RBC AUTO: 93.6 FL (ref 79–97)
MONOCYTES # BLD AUTO: 0.74 10*3/MM3 (ref 0.1–0.9)
MONOCYTES NFR BLD AUTO: 11.1 % (ref 5–12)
NEUTROPHILS NFR BLD AUTO: 3.31 10*3/MM3 (ref 1.7–7)
NEUTROPHILS NFR BLD AUTO: 49.6 % (ref 42.7–76)
NRBC BLD AUTO-RTO: 0 /100 WBC (ref 0–0.2)
PLATELET # BLD AUTO: 271 10*3/MM3 (ref 140–450)
PMV BLD AUTO: 10.3 FL (ref 6–12)
POTASSIUM SERPL-SCNC: 4.7 MMOL/L (ref 3.5–5.2)
RBC # BLD AUTO: 4.06 10*6/MM3 (ref 3.77–5.28)
SODIUM SERPL-SCNC: 138 MMOL/L (ref 136–145)
WBC # BLD AUTO: 6.66 10*3/MM3 (ref 3.4–10.8)

## 2020-07-21 PROCEDURE — 85025 COMPLETE CBC W/AUTO DIFF WBC: CPT

## 2020-07-21 PROCEDURE — 80048 BASIC METABOLIC PNL TOTAL CA: CPT

## 2020-07-21 PROCEDURE — 36415 COLL VENOUS BLD VENIPUNCTURE: CPT

## 2021-01-04 RX ORDER — LISINOPRIL 20 MG/1
TABLET ORAL
Qty: 30 TABLET | Refills: 0 | Status: SHIPPED | OUTPATIENT
Start: 2021-01-04 | End: 2021-02-10

## 2021-01-04 RX ORDER — GLIMEPIRIDE 2 MG/1
TABLET ORAL
Qty: 60 TABLET | Refills: 0 | Status: SHIPPED | OUTPATIENT
Start: 2021-01-04 | End: 2021-02-10

## 2021-01-04 RX ORDER — SERTRALINE HYDROCHLORIDE 100 MG/1
TABLET, FILM COATED ORAL
Qty: 30 TABLET | Refills: 0 | Status: SHIPPED | OUTPATIENT
Start: 2021-01-04 | End: 2021-02-10

## 2021-01-21 ENCOUNTER — LAB (OUTPATIENT)
Dept: LAB | Facility: HOSPITAL | Age: 81
End: 2021-01-21

## 2021-01-21 ENCOUNTER — TRANSCRIBE ORDERS (OUTPATIENT)
Dept: LAB | Facility: HOSPITAL | Age: 81
End: 2021-01-21

## 2021-01-21 DIAGNOSIS — N18.30 STAGE 3 CHRONIC KIDNEY DISEASE, UNSPECIFIED WHETHER STAGE 3A OR 3B CKD (HCC): Primary | ICD-10-CM

## 2021-01-21 DIAGNOSIS — N18.30 STAGE 3 CHRONIC KIDNEY DISEASE, UNSPECIFIED WHETHER STAGE 3A OR 3B CKD (HCC): ICD-10-CM

## 2021-01-21 LAB
ANION GAP SERPL CALCULATED.3IONS-SCNC: 9.8 MMOL/L (ref 5–15)
BACTERIA UR QL AUTO: ABNORMAL /HPF
BASOPHILS # BLD AUTO: 0.07 10*3/MM3 (ref 0–0.2)
BASOPHILS NFR BLD AUTO: 0.9 % (ref 0–1.5)
BILIRUB UR QL STRIP: NEGATIVE
BUN SERPL-MCNC: 41 MG/DL (ref 8–23)
BUN/CREAT SERPL: 29.5 (ref 7–25)
CALCIUM SPEC-SCNC: 9.8 MG/DL (ref 8.6–10.5)
CHLORIDE SERPL-SCNC: 105 MMOL/L (ref 98–107)
CLARITY UR: ABNORMAL
CO2 SERPL-SCNC: 24.2 MMOL/L (ref 22–29)
COLOR UR: YELLOW
CREAT SERPL-MCNC: 1.39 MG/DL (ref 0.57–1)
DEPRECATED RDW RBC AUTO: 43.3 FL (ref 37–54)
EOSINOPHIL # BLD AUTO: 0.58 10*3/MM3 (ref 0–0.4)
EOSINOPHIL NFR BLD AUTO: 7.4 % (ref 0.3–6.2)
ERYTHROCYTE [DISTWIDTH] IN BLOOD BY AUTOMATED COUNT: 12.7 % (ref 12.3–15.4)
GFR SERPL CREATININE-BSD FRML MDRD: 36 ML/MIN/1.73
GLUCOSE SERPL-MCNC: 131 MG/DL (ref 65–99)
GLUCOSE UR STRIP-MCNC: NEGATIVE MG/DL
HCT VFR BLD AUTO: 39.7 % (ref 34–46.6)
HGB BLD-MCNC: 12.8 G/DL (ref 12–15.9)
HGB UR QL STRIP.AUTO: ABNORMAL
HYALINE CASTS UR QL AUTO: ABNORMAL /LPF
IMM GRANULOCYTES # BLD AUTO: 0.02 10*3/MM3 (ref 0–0.05)
IMM GRANULOCYTES NFR BLD AUTO: 0.3 % (ref 0–0.5)
KETONES UR QL STRIP: NEGATIVE
LEUKOCYTE ESTERASE UR QL STRIP.AUTO: ABNORMAL
LYMPHOCYTES # BLD AUTO: 2.31 10*3/MM3 (ref 0.7–3.1)
LYMPHOCYTES NFR BLD AUTO: 29.4 % (ref 19.6–45.3)
MCH RBC QN AUTO: 30.3 PG (ref 26.6–33)
MCHC RBC AUTO-ENTMCNC: 32.2 G/DL (ref 31.5–35.7)
MCV RBC AUTO: 94.1 FL (ref 79–97)
MONOCYTES # BLD AUTO: 0.76 10*3/MM3 (ref 0.1–0.9)
MONOCYTES NFR BLD AUTO: 9.7 % (ref 5–12)
NEUTROPHILS NFR BLD AUTO: 4.13 10*3/MM3 (ref 1.7–7)
NEUTROPHILS NFR BLD AUTO: 52.3 % (ref 42.7–76)
NITRITE UR QL STRIP: POSITIVE
NRBC BLD AUTO-RTO: 0 /100 WBC (ref 0–0.2)
PH UR STRIP.AUTO: 6 [PH] (ref 5–8)
PLATELET # BLD AUTO: 256 10*3/MM3 (ref 140–450)
PMV BLD AUTO: 10.3 FL (ref 6–12)
POTASSIUM SERPL-SCNC: 5.1 MMOL/L (ref 3.5–5.2)
PROT UR QL STRIP: ABNORMAL
RBC # BLD AUTO: 4.22 10*6/MM3 (ref 3.77–5.28)
RBC # UR: ABNORMAL /HPF
REF LAB TEST METHOD: ABNORMAL
SODIUM SERPL-SCNC: 139 MMOL/L (ref 136–145)
SP GR UR STRIP: 1.02 (ref 1–1.03)
SQUAMOUS #/AREA URNS HPF: ABNORMAL /HPF
UROBILINOGEN UR QL STRIP: ABNORMAL
WBC # BLD AUTO: 7.87 10*3/MM3 (ref 3.4–10.8)
WBC UR QL AUTO: ABNORMAL /HPF

## 2021-01-21 PROCEDURE — 85025 COMPLETE CBC W/AUTO DIFF WBC: CPT

## 2021-01-21 PROCEDURE — 36415 COLL VENOUS BLD VENIPUNCTURE: CPT

## 2021-01-21 PROCEDURE — 87186 SC STD MICRODIL/AGAR DIL: CPT

## 2021-01-21 PROCEDURE — 87086 URINE CULTURE/COLONY COUNT: CPT

## 2021-01-21 PROCEDURE — 80048 BASIC METABOLIC PNL TOTAL CA: CPT

## 2021-01-21 PROCEDURE — 81001 URINALYSIS AUTO W/SCOPE: CPT

## 2021-01-23 LAB — BACTERIA SPEC AEROBE CULT: ABNORMAL

## 2021-02-10 RX ORDER — GLIMEPIRIDE 2 MG/1
TABLET ORAL
Qty: 60 TABLET | Refills: 0 | Status: SHIPPED | OUTPATIENT
Start: 2021-02-10 | End: 2021-03-09

## 2021-02-10 RX ORDER — LISINOPRIL 20 MG/1
TABLET ORAL
Qty: 30 TABLET | Refills: 0 | Status: SHIPPED | OUTPATIENT
Start: 2021-02-10 | End: 2021-03-09

## 2021-02-10 RX ORDER — SERTRALINE HYDROCHLORIDE 100 MG/1
TABLET, FILM COATED ORAL
Qty: 30 TABLET | Refills: 0 | Status: SHIPPED | OUTPATIENT
Start: 2021-02-10 | End: 2021-03-09

## 2021-03-09 RX ORDER — GLIMEPIRIDE 2 MG/1
TABLET ORAL
Qty: 60 TABLET | Refills: 0 | Status: SHIPPED | OUTPATIENT
Start: 2021-03-09 | End: 2021-04-07

## 2021-03-09 RX ORDER — LISINOPRIL 20 MG/1
TABLET ORAL
Qty: 30 TABLET | Refills: 0 | Status: SHIPPED | OUTPATIENT
Start: 2021-03-09 | End: 2021-04-07

## 2021-03-09 RX ORDER — SERTRALINE HYDROCHLORIDE 100 MG/1
TABLET, FILM COATED ORAL
Qty: 30 TABLET | Refills: 0 | Status: SHIPPED | OUTPATIENT
Start: 2021-03-09 | End: 2021-04-07

## 2021-03-15 RX ORDER — ALLOPURINOL 100 MG/1
TABLET ORAL
Qty: 90 TABLET | Refills: 1 | OUTPATIENT
Start: 2021-03-15

## 2021-04-03 RX ORDER — ALLOPURINOL 100 MG/1
TABLET ORAL
Qty: 90 TABLET | Refills: 1 | OUTPATIENT
Start: 2021-04-03

## 2021-04-03 RX ORDER — GABAPENTIN 300 MG/1
CAPSULE ORAL
Qty: 90 CAPSULE | Refills: 0 | Status: SHIPPED | OUTPATIENT
Start: 2021-04-03 | End: 2021-10-04

## 2021-04-07 RX ORDER — LISINOPRIL 20 MG/1
TABLET ORAL
Qty: 30 TABLET | Refills: 0 | Status: SHIPPED | OUTPATIENT
Start: 2021-04-07 | End: 2021-04-28

## 2021-04-07 RX ORDER — SERTRALINE HYDROCHLORIDE 100 MG/1
TABLET, FILM COATED ORAL
Qty: 30 TABLET | Refills: 0 | Status: SHIPPED | OUTPATIENT
Start: 2021-04-07 | End: 2021-05-07

## 2021-04-07 RX ORDER — GLIMEPIRIDE 2 MG/1
TABLET ORAL
Qty: 60 TABLET | Refills: 0 | Status: SHIPPED | OUTPATIENT
Start: 2021-04-07 | End: 2021-05-07

## 2021-04-19 ENCOUNTER — TELEPHONE (OUTPATIENT)
Dept: FAMILY MEDICINE CLINIC | Facility: CLINIC | Age: 81
End: 2021-04-19

## 2021-04-19 DIAGNOSIS — R30.0 DYSURIA: Primary | ICD-10-CM

## 2021-04-19 RX ORDER — CEPHALEXIN 250 MG/1
250 CAPSULE ORAL 2 TIMES DAILY
Qty: 10 CAPSULE | Refills: 0 | Status: SHIPPED | OUTPATIENT
Start: 2021-04-19 | End: 2021-04-24

## 2021-04-19 NOTE — TELEPHONE ENCOUNTER
Keflex sent.  She needs to increase fluids and if symptoms persist or worsen, she should be seen in the office.

## 2021-04-19 NOTE — TELEPHONE ENCOUNTER
Caller: Lynda Patterson    Relationship: Self    Best call back number: [phone]   What medication are you requesting: SOMETHING FOR BURNING WITH URINATION AND LOOSING CONTROL OF BLADDER   What are your current symptoms: BURNING WITH URINATION    How long have you been experiencing symptoms: 2 WEEKS    Have you had these symptoms before:    [x] Yes  [] No    Have you been treated for these symptoms before:   [x] Yes  [] No    If a prescription is needed, what is your preferred pharmacy and phone number:  [facility] - [address] [place] [address] - [phone]  - [phone]  [phone]    Additional notes:

## 2021-04-28 ENCOUNTER — LAB (OUTPATIENT)
Dept: FAMILY MEDICINE CLINIC | Facility: CLINIC | Age: 81
End: 2021-04-28

## 2021-04-28 ENCOUNTER — OFFICE VISIT (OUTPATIENT)
Dept: FAMILY MEDICINE CLINIC | Facility: CLINIC | Age: 81
End: 2021-04-28

## 2021-04-28 VITALS
DIASTOLIC BLOOD PRESSURE: 67 MMHG | BODY MASS INDEX: 35.76 KG/M2 | HEART RATE: 66 BPM | HEIGHT: 61 IN | TEMPERATURE: 97.1 F | SYSTOLIC BLOOD PRESSURE: 96 MMHG | OXYGEN SATURATION: 95 % | WEIGHT: 189.4 LBS

## 2021-04-28 DIAGNOSIS — N18.30 TYPE 2 DIABETES MELLITUS WITH STAGE 3 CHRONIC KIDNEY DISEASE, WITHOUT LONG-TERM CURRENT USE OF INSULIN, UNSPECIFIED WHETHER STAGE 3A OR 3B CKD (HCC): ICD-10-CM

## 2021-04-28 DIAGNOSIS — E11.22 TYPE 2 DIABETES MELLITUS WITH STAGE 3 CHRONIC KIDNEY DISEASE, WITHOUT LONG-TERM CURRENT USE OF INSULIN, UNSPECIFIED WHETHER STAGE 3A OR 3B CKD (HCC): ICD-10-CM

## 2021-04-28 DIAGNOSIS — M54.50 LUMBAR BACK PAIN: ICD-10-CM

## 2021-04-28 DIAGNOSIS — D53.9 MACROCYTIC ANEMIA: ICD-10-CM

## 2021-04-28 DIAGNOSIS — E78.2 MIXED HYPERLIPIDEMIA: ICD-10-CM

## 2021-04-28 DIAGNOSIS — R30.0 DYSURIA: ICD-10-CM

## 2021-04-28 DIAGNOSIS — I10 ESSENTIAL (PRIMARY) HYPERTENSION: ICD-10-CM

## 2021-04-28 DIAGNOSIS — M54.2 CERVICAL SPINE PAIN: ICD-10-CM

## 2021-04-28 DIAGNOSIS — R55 SYNCOPE, UNSPECIFIED SYNCOPE TYPE: ICD-10-CM

## 2021-04-28 DIAGNOSIS — M54.6 THORACIC BACK PAIN, UNSPECIFIED BACK PAIN LATERALITY, UNSPECIFIED CHRONICITY: ICD-10-CM

## 2021-04-28 DIAGNOSIS — I10 ESSENTIAL (PRIMARY) HYPERTENSION: Primary | ICD-10-CM

## 2021-04-28 LAB
ALBUMIN SERPL-MCNC: 4.3 G/DL (ref 3.5–5.2)
ALBUMIN/GLOB SERPL: 1.2 G/DL
ALP SERPL-CCNC: 77 U/L (ref 39–117)
ALT SERPL W P-5'-P-CCNC: 13 U/L (ref 1–33)
ANION GAP SERPL CALCULATED.3IONS-SCNC: 12.5 MMOL/L (ref 5–15)
AST SERPL-CCNC: 18 U/L (ref 1–32)
BASOPHILS # BLD AUTO: 0.05 10*3/MM3 (ref 0–0.2)
BASOPHILS NFR BLD AUTO: 0.5 % (ref 0–1.5)
BILIRUB BLD-MCNC: ABNORMAL MG/DL
BILIRUB SERPL-MCNC: 0.6 MG/DL (ref 0–1.2)
BUN SERPL-MCNC: 35 MG/DL (ref 8–23)
BUN/CREAT SERPL: 18 (ref 7–25)
CALCIUM SPEC-SCNC: 10.3 MG/DL (ref 8.6–10.5)
CHLORIDE SERPL-SCNC: 101 MMOL/L (ref 98–107)
CHOLEST SERPL-MCNC: 308 MG/DL (ref 0–200)
CLARITY, POC: ABNORMAL
CO2 SERPL-SCNC: 25.5 MMOL/L (ref 22–29)
COLOR UR: YELLOW
CREAT SERPL-MCNC: 1.94 MG/DL (ref 0.57–1)
DEPRECATED RDW RBC AUTO: 43.6 FL (ref 37–54)
EOSINOPHIL # BLD AUTO: 0.29 10*3/MM3 (ref 0–0.4)
EOSINOPHIL NFR BLD AUTO: 3.1 % (ref 0.3–6.2)
ERYTHROCYTE [DISTWIDTH] IN BLOOD BY AUTOMATED COUNT: 12.8 % (ref 12.3–15.4)
GFR SERPL CREATININE-BSD FRML MDRD: 25 ML/MIN/1.73
GLOBULIN UR ELPH-MCNC: 3.6 GM/DL
GLUCOSE SERPL-MCNC: 101 MG/DL (ref 65–99)
GLUCOSE UR STRIP-MCNC: ABNORMAL MG/DL
HBA1C MFR BLD: 6.4 % (ref 3.5–5.6)
HCT VFR BLD AUTO: 41.5 % (ref 34–46.6)
HDLC SERPL-MCNC: 62 MG/DL (ref 40–60)
HGB BLD-MCNC: 13.8 G/DL (ref 12–15.9)
IMM GRANULOCYTES # BLD AUTO: 0.04 10*3/MM3 (ref 0–0.05)
IMM GRANULOCYTES NFR BLD AUTO: 0.4 % (ref 0–0.5)
KETONES UR QL: ABNORMAL
LDLC SERPL CALC-MCNC: 220 MG/DL (ref 0–100)
LDLC/HDLC SERPL: 3.51 {RATIO}
LEUKOCYTE EST, POC: NEGATIVE
LYMPHOCYTES # BLD AUTO: 2.19 10*3/MM3 (ref 0.7–3.1)
LYMPHOCYTES NFR BLD AUTO: 23.5 % (ref 19.6–45.3)
MCH RBC QN AUTO: 31.3 PG (ref 26.6–33)
MCHC RBC AUTO-ENTMCNC: 33.3 G/DL (ref 31.5–35.7)
MCV RBC AUTO: 94.1 FL (ref 79–97)
MONOCYTES # BLD AUTO: 1.14 10*3/MM3 (ref 0.1–0.9)
MONOCYTES NFR BLD AUTO: 12.2 % (ref 5–12)
NEUTROPHILS NFR BLD AUTO: 5.61 10*3/MM3 (ref 1.7–7)
NEUTROPHILS NFR BLD AUTO: 60.3 % (ref 42.7–76)
NITRITE UR-MCNC: NEGATIVE MG/ML
NRBC BLD AUTO-RTO: 0 /100 WBC (ref 0–0.2)
PH UR: 5 [PH] (ref 5–8)
PLATELET # BLD AUTO: 277 10*3/MM3 (ref 140–450)
PMV BLD AUTO: 10.5 FL (ref 6–12)
POTASSIUM SERPL-SCNC: 4.9 MMOL/L (ref 3.5–5.2)
PROT SERPL-MCNC: 7.9 G/DL (ref 6–8.5)
PROT UR STRIP-MCNC: ABNORMAL MG/DL
RBC # BLD AUTO: 4.41 10*6/MM3 (ref 3.77–5.28)
RBC # UR STRIP: ABNORMAL /UL
SODIUM SERPL-SCNC: 139 MMOL/L (ref 136–145)
SP GR UR: 1.02 (ref 1–1.03)
TRIGL SERPL-MCNC: 142 MG/DL (ref 0–150)
UROBILINOGEN UR QL: NORMAL
VLDLC SERPL-MCNC: 26 MG/DL (ref 5–40)
WBC # BLD AUTO: 9.32 10*3/MM3 (ref 3.4–10.8)

## 2021-04-28 PROCEDURE — 83036 HEMOGLOBIN GLYCOSYLATED A1C: CPT | Performed by: FAMILY MEDICINE

## 2021-04-28 PROCEDURE — 81003 URINALYSIS AUTO W/O SCOPE: CPT | Performed by: FAMILY MEDICINE

## 2021-04-28 PROCEDURE — 85025 COMPLETE CBC W/AUTO DIFF WBC: CPT | Performed by: FAMILY MEDICINE

## 2021-04-28 PROCEDURE — 99214 OFFICE O/P EST MOD 30 MIN: CPT | Performed by: FAMILY MEDICINE

## 2021-04-28 PROCEDURE — 36415 COLL VENOUS BLD VENIPUNCTURE: CPT | Performed by: FAMILY MEDICINE

## 2021-04-28 PROCEDURE — 80061 LIPID PANEL: CPT | Performed by: FAMILY MEDICINE

## 2021-04-28 PROCEDURE — 80053 COMPREHEN METABOLIC PANEL: CPT | Performed by: FAMILY MEDICINE

## 2021-04-28 RX ORDER — NITROFURANTOIN 25; 75 MG/1; MG/1
100 CAPSULE ORAL 2 TIMES DAILY
Qty: 14 CAPSULE | Refills: 0 | Status: SHIPPED | OUTPATIENT
Start: 2021-04-28 | End: 2021-05-24 | Stop reason: SDUPTHER

## 2021-04-28 RX ORDER — OXYBUTYNIN CHLORIDE 10 MG/1
10 TABLET, EXTENDED RELEASE ORAL DAILY
Qty: 90 TABLET | Refills: 3 | Status: SHIPPED | OUTPATIENT
Start: 2021-04-28 | End: 2022-07-22

## 2021-04-29 RX ORDER — ATORVASTATIN CALCIUM 20 MG/1
10 TABLET, FILM COATED ORAL
Qty: 90 TABLET | Refills: 3 | Status: SHIPPED | OUTPATIENT
Start: 2021-04-29 | End: 2022-07-09

## 2021-05-07 RX ORDER — LISINOPRIL 20 MG/1
TABLET ORAL
Qty: 30 TABLET | Refills: 0 | OUTPATIENT
Start: 2021-05-07

## 2021-05-07 RX ORDER — GLIMEPIRIDE 2 MG/1
TABLET ORAL
Qty: 180 TABLET | Refills: 1 | Status: SHIPPED | OUTPATIENT
Start: 2021-05-07 | End: 2021-11-02

## 2021-05-07 RX ORDER — SERTRALINE HYDROCHLORIDE 100 MG/1
TABLET, FILM COATED ORAL
Qty: 90 TABLET | Refills: 1 | Status: SHIPPED | OUTPATIENT
Start: 2021-05-07 | End: 2021-10-20 | Stop reason: SDUPTHER

## 2021-05-11 RX ORDER — LISINOPRIL 20 MG/1
TABLET ORAL
Qty: 30 TABLET | Refills: 0 | OUTPATIENT
Start: 2021-05-11

## 2021-05-12 ENCOUNTER — OFFICE VISIT (OUTPATIENT)
Dept: FAMILY MEDICINE CLINIC | Facility: CLINIC | Age: 81
End: 2021-05-12

## 2021-05-12 VITALS
OXYGEN SATURATION: 97 % | TEMPERATURE: 96.7 F | HEART RATE: 56 BPM | HEIGHT: 61 IN | WEIGHT: 194 LBS | DIASTOLIC BLOOD PRESSURE: 83 MMHG | BODY MASS INDEX: 36.63 KG/M2 | SYSTOLIC BLOOD PRESSURE: 166 MMHG

## 2021-05-12 DIAGNOSIS — I10 ESSENTIAL HYPERTENSION: Primary | ICD-10-CM

## 2021-05-12 DIAGNOSIS — R42 LIGHTHEADED: ICD-10-CM

## 2021-05-12 DIAGNOSIS — M54.50 LUMBAR BACK PAIN: ICD-10-CM

## 2021-05-12 DIAGNOSIS — M54.2 CERVICAL SPINE PAIN: ICD-10-CM

## 2021-05-12 DIAGNOSIS — M54.6 THORACIC BACK PAIN, UNSPECIFIED BACK PAIN LATERALITY, UNSPECIFIED CHRONICITY: ICD-10-CM

## 2021-05-12 PROCEDURE — 99213 OFFICE O/P EST LOW 20 MIN: CPT | Performed by: FAMILY MEDICINE

## 2021-05-12 RX ORDER — LISINOPRIL 10 MG/1
10 TABLET ORAL DAILY
Start: 2021-05-12 | End: 2021-10-20 | Stop reason: SDUPTHER

## 2021-05-12 NOTE — PATIENT INSTRUCTIONS
Take a 1/2 pill of the lisinopril 20mg pills you have at home  May take 2 extra strength Tylenol 3 times a day as needed for pain  Can also use a pain cream along the spine as wellKeep working to lose weight through healthy eating and exercise.

## 2021-05-12 NOTE — PROGRESS NOTES
Subjective   Lynda Patterson is a 81 y.o. female.     She is here for follow-up after her lisinopril was stopped 2 weeks ago secondary to the static hypotension  She still has episodes of feeling a little light-headed but overall feels much better and has more energy since stopping the lisinopril  Back pain - still having it  Reviewed the x-ray results with her - advanced arthritic changes  She saw no benefit with PT in the past an failed epidurals  She is using salonpas and occ tylenol         The following portions of the patient's history were reviewed and updated as appropriate: allergies, current medications, past family history, past medical history, past social history, past surgical history, and problem list.  Past Medical History:   Diagnosis Date   • Anxiety    • Depression    • DM2 (diabetes mellitus, type 2) (CMS/[facility])    • Gout    • Heart disease    • Hyperlipidemia    • Hypertension    • Kidney failure     Stage three kidney failure , abstraction from [facility]   • Macular degeneration      Past Surgical History:   Procedure Laterality Date   • CHOLECYSTECTOMY  1988   • TOTAL ABDOMINAL HYSTERECTOMY  1970     History reviewed. No pertinent family history.  Social History     Socioeconomic History   • Marital status:      Spouse name: Not on file   • Number of children: Not on file   • Years of education: Not on file   • Highest education level: Not on file   Tobacco Use   • Smoking status: Never Smoker   • Smokeless tobacco: Never Used   Vaping Use   • Vaping Use: Never used   Substance and Sexual Activity   • Alcohol use: No   • Drug use: No         Current Outpatient Medications:   •  atorvastatin (LIPITOR) 20 MG tablet, Take 0.5 tablets by mouth every night at bedtime., Disp: 90 tablet, Rfl: 3  •  B Complex Vitamins (VITAMIN B COMPLEX) tablet, Take 1 tablet by mouth Daily., Disp: , Rfl:   •  gabapentin (NEURONTIN) 300 MG capsule, TAKE ONE CAPSULE BY MOUTH EVERY NIGHT AT BEDTIME FOR  "NEUROPATHY, Disp: 90 capsule, Rfl: 0  •  glimepiride (AMARYL) 2 MG tablet, TAKE ONE TABLET BY MOUTH TWICE A DAY WITH MEALS, Disp: 180 tablet, Rfl: 1  •  glucose blood (ONE TOUCH ULTRA TEST) test strip, Test BS once a day E11.22 one touch ultra, Disp: 100 each, Rfl: 1  •  Lutein 20 MG tablet, LUTEIN 20 MG TABS, Disp: , Rfl:   •  metFORMIN (GLUCOPHAGE) 500 MG tablet, Take 1 tablet by mouth 2 (Two) Times a Day., Disp: , Rfl:   •  Multiple Vitamins-Minerals (ICAPS) tablet, ICAPS AREDS FORMULA TABS, Disp: , Rfl:   •  ONE TOUCH LANCETS misc, ONETOUCH LANCETS, Disp: , Rfl:   •  oxybutynin XL (Ditropan XL) 10 MG 24 hr tablet, Take 1 tablet by mouth Daily., Disp: 90 tablet, Rfl: 3  •  sertraline (ZOLOFT) 100 MG tablet, TAKE ONE TABLET BY MOUTH DAILY FOR DEPRESSION, Disp: 90 tablet, Rfl: 1  •  lisinopril (PRINIVIL,ZESTRIL) 10 MG tablet, Take 1 tablet by mouth Daily., Disp: , Rfl:   •  nitrofurantoin, macrocrystal-monohydrate, (Macrobid) 100 MG capsule, Take 1 capsule by mouth 2 (Two) Times a Day., Disp: 14 capsule, Rfl: 0    Review of Systems   Constitutional: Negative for diaphoresis, fatigue, fever, unexpected weight gain and unexpected weight loss.   Respiratory: Negative for cough, chest tightness and shortness of breath.    Cardiovascular: Negative for chest pain, palpitations and leg swelling.   Gastrointestinal: Negative for nausea and vomiting.   Musculoskeletal: Positive for back pain.   Neurological: Positive for light-headedness. Negative for dizziness, syncope and headache.     /83 (BP Location: Left arm, Patient Position: Sitting, Cuff Size: Large Adult)   Pulse 56   Temp 96.7 °F (35.9 °C) (Temporal)   Ht 154.9 cm (61\")   Wt 88 kg (194 lb)   SpO2 97%   Breastfeeding No   BMI 36.66 kg/m²       Objective   Physical Exam  Vitals and nursing note reviewed.   Constitutional:       General: She is not in acute distress.     Appearance: Normal appearance. She is well-developed. She is obese.   HENT:      " Head: Normocephalic and atraumatic.   Neck:      Thyroid: No thyromegaly.   Cardiovascular:      Rate and Rhythm: Normal rate and regular rhythm.      Heart sounds: Normal heart sounds. No murmur heard.   No friction rub. No gallop.    Pulmonary:      Effort: Pulmonary effort is normal. No respiratory distress.      Breath sounds: Normal breath sounds. No wheezing or rales.   Musculoskeletal:      Cervical back: Neck supple.      Right lower leg: No edema.      Left lower leg: No edema.   Lymphadenopathy:      Cervical: No cervical adenopathy.   Skin:     General: Skin is warm and dry.   Neurological:      Mental Status: She is alert.      Comments: Uses cane to ambulate   Psychiatric:         Mood and Affect: Mood normal.       Vitals:    05/12/21 1420 05/12/21 1439 05/12/21 1440 05/12/21 1442   Orthostatic BP:  141/80 140/86 146/82   Orthostatic Pulse:  55 54 64   Patient Position: Sitting Lying  Standing           Assessment/Plan   Problems Addressed this Visit        Cardiac and Vasculature    Hypertension - Primary    Relevant Medications    lisinopril (PRINIVIL,ZESTRIL) 10 MG tablet      Other Visit Diagnoses     Lightheaded        Thoracic back pain, unspecified back pain laterality, unspecified chronicity        Cervical spine pain        Lumbar back pain          Diagnoses       Codes Comments    Essential hypertension    -  Primary ICD-10-CM: I10  ICD-9-CM: 401.9     Lightheaded     ICD-10-CM: R42  ICD-9-CM: 780.4     Thoracic back pain, unspecified back pain laterality, unspecified chronicity     ICD-10-CM: M54.6  ICD-9-CM: 724.1     Cervical spine pain     ICD-10-CM: M54.2  ICD-9-CM: 723.1     Lumbar back pain     ICD-10-CM: M54.5  ICD-9-CM: 724.2         Will decrease her lisinopril dose from 20mg to 10mg  Will see her back in 3 mo  Counseled on the need for weight loss through exercise and healthy eating   rec she use otc pain cream to help her back and she can take 2 extra strength Tylenol 3 times a  day as needed for pain

## 2021-05-24 ENCOUNTER — TELEPHONE (OUTPATIENT)
Dept: FAMILY MEDICINE CLINIC | Facility: CLINIC | Age: 81
End: 2021-05-24

## 2021-05-24 RX ORDER — NITROFURANTOIN 25; 75 MG/1; MG/1
100 CAPSULE ORAL 2 TIMES DAILY
Qty: 14 CAPSULE | Refills: 0 | Status: SHIPPED | OUTPATIENT
Start: 2021-05-24 | End: 2021-06-15 | Stop reason: SDUPTHER

## 2021-05-24 NOTE — TELEPHONE ENCOUNTER
Caller: Lynda Patterson    Relationship: Self    Best call back number: [phone]    What medication are you requesting: MEDICATION FOR A BLADDER INFECTION     What are your current symptoms: FREQUENCY , BURNING URINATION     How long have you been experiencing symptoms: STARTED Friday     Have you had these symptoms before:    [x] Yes  [] No    Have you been treated for these symptoms before:   [x] Yes  [] No    If a prescription is needed, what is your preferred pharmacy and phone number: [facility] [place] - [address] [place] [address] - [phone] [facility] [phone]   [phone]     Additional notes: N/A

## 2021-06-06 RX ORDER — LISINOPRIL 20 MG/1
TABLET ORAL
Qty: 30 TABLET | Refills: 0 | OUTPATIENT
Start: 2021-06-06

## 2021-06-09 RX ORDER — LISINOPRIL 20 MG/1
TABLET ORAL
Qty: 30 TABLET | Refills: 0 | OUTPATIENT
Start: 2021-06-09

## 2021-06-15 ENCOUNTER — TELEPHONE (OUTPATIENT)
Dept: FAMILY MEDICINE CLINIC | Facility: CLINIC | Age: 81
End: 2021-06-15

## 2021-06-15 RX ORDER — NITROFURANTOIN 25; 75 MG/1; MG/1
100 CAPSULE ORAL 2 TIMES DAILY
Qty: 14 CAPSULE | Refills: 0 | Status: SHIPPED | OUTPATIENT
Start: 2021-06-15 | End: 2021-10-20

## 2021-06-15 NOTE — TELEPHONE ENCOUNTER
Caller: Lynda Patterson    Relationship: Self    Best call back number: [phone]  What medication are you requesting: ANTIBIOTIC    What are your current symptoms: BURNING WITH URINATION, FREQUENCY    How long have you been experiencing symptoms:THIRD BOUT OF HAVING THIS IN 2 MONTHS  Have you had these symptoms before:    [x] Yes  [] No    Have you been treated for these symptoms before:   [x] Yes  [] No    If a prescription is needed, what is your preferred pharmacy and phone number: [facility]  [facility] [place] - [address] [place] [address] - [phone] [facility] [phone] FX

## 2021-06-15 NOTE — TELEPHONE ENCOUNTER
Sent meds to the pharmacy but if she is not seeing improvement she needs to be seen in the office  If she has another infection she needs to be seen in the office so we can do a culture

## 2021-07-05 RX ORDER — LISINOPRIL 20 MG/1
TABLET ORAL
Qty: 30 TABLET | Refills: 0 | OUTPATIENT
Start: 2021-07-05

## 2021-07-14 ENCOUNTER — LAB (OUTPATIENT)
Dept: LAB | Facility: HOSPITAL | Age: 81
End: 2021-07-14

## 2021-07-14 ENCOUNTER — TRANSCRIBE ORDERS (OUTPATIENT)
Dept: LAB | Facility: HOSPITAL | Age: 81
End: 2021-07-14

## 2021-07-14 DIAGNOSIS — N18.30 STAGE 3 CHRONIC KIDNEY DISEASE, UNSPECIFIED WHETHER STAGE 3A OR 3B CKD (HCC): Primary | ICD-10-CM

## 2021-07-14 DIAGNOSIS — N18.30 STAGE 3 CHRONIC KIDNEY DISEASE, UNSPECIFIED WHETHER STAGE 3A OR 3B CKD (HCC): ICD-10-CM

## 2021-07-14 LAB
ANION GAP SERPL CALCULATED.3IONS-SCNC: 12.5 MMOL/L (ref 5–15)
BACTERIA UR QL AUTO: ABNORMAL /HPF
BASOPHILS # BLD AUTO: 0.05 10*3/MM3 (ref 0–0.2)
BASOPHILS NFR BLD AUTO: 0.8 % (ref 0–1.5)
BILIRUB UR QL STRIP: NEGATIVE
BUN SERPL-MCNC: 32 MG/DL (ref 8–23)
BUN/CREAT SERPL: 22.5 (ref 7–25)
CALCIUM SPEC-SCNC: 8.7 MG/DL (ref 8.6–10.5)
CHLORIDE SERPL-SCNC: 103 MMOL/L (ref 98–107)
CLARITY UR: ABNORMAL
CO2 SERPL-SCNC: 22.5 MMOL/L (ref 22–29)
COLOR UR: YELLOW
CREAT SERPL-MCNC: 1.42 MG/DL (ref 0.57–1)
DEPRECATED RDW RBC AUTO: 41.5 FL (ref 37–54)
EOSINOPHIL # BLD AUTO: 0.53 10*3/MM3 (ref 0–0.4)
EOSINOPHIL NFR BLD AUTO: 8.1 % (ref 0.3–6.2)
ERYTHROCYTE [DISTWIDTH] IN BLOOD BY AUTOMATED COUNT: 12 % (ref 12.3–15.4)
GFR SERPL CREATININE-BSD FRML MDRD: 35 ML/MIN/1.73
GLUCOSE SERPL-MCNC: 157 MG/DL (ref 65–99)
GLUCOSE UR STRIP-MCNC: NEGATIVE MG/DL
HCT VFR BLD AUTO: 38.8 % (ref 34–46.6)
HGB BLD-MCNC: 12.7 G/DL (ref 12–15.9)
HGB UR QL STRIP.AUTO: NEGATIVE
HYALINE CASTS UR QL AUTO: ABNORMAL /LPF
IMM GRANULOCYTES # BLD AUTO: 0.01 10*3/MM3 (ref 0–0.05)
IMM GRANULOCYTES NFR BLD AUTO: 0.2 % (ref 0–0.5)
KETONES UR QL STRIP: NEGATIVE
LEUKOCYTE ESTERASE UR QL STRIP.AUTO: ABNORMAL
LYMPHOCYTES # BLD AUTO: 1.82 10*3/MM3 (ref 0.7–3.1)
LYMPHOCYTES NFR BLD AUTO: 28 % (ref 19.6–45.3)
MCH RBC QN AUTO: 30.8 PG (ref 26.6–33)
MCHC RBC AUTO-ENTMCNC: 32.7 G/DL (ref 31.5–35.7)
MCV RBC AUTO: 93.9 FL (ref 79–97)
MONOCYTES # BLD AUTO: 0.74 10*3/MM3 (ref 0.1–0.9)
MONOCYTES NFR BLD AUTO: 11.4 % (ref 5–12)
NEUTROPHILS NFR BLD AUTO: 3.36 10*3/MM3 (ref 1.7–7)
NEUTROPHILS NFR BLD AUTO: 51.5 % (ref 42.7–76)
NITRITE UR QL STRIP: NEGATIVE
NRBC BLD AUTO-RTO: 0 /100 WBC (ref 0–0.2)
PH UR STRIP.AUTO: 5.5 [PH] (ref 5–8)
PLATELET # BLD AUTO: 248 10*3/MM3 (ref 140–450)
PMV BLD AUTO: 10.3 FL (ref 6–12)
POTASSIUM SERPL-SCNC: 4.3 MMOL/L (ref 3.5–5.2)
PROT UR QL STRIP: NEGATIVE
RBC # BLD AUTO: 4.13 10*6/MM3 (ref 3.77–5.28)
RBC # UR: ABNORMAL /HPF
REF LAB TEST METHOD: ABNORMAL
SODIUM SERPL-SCNC: 138 MMOL/L (ref 136–145)
SP GR UR STRIP: 1.01 (ref 1–1.03)
SQUAMOUS #/AREA URNS HPF: ABNORMAL /HPF
UROBILINOGEN UR QL STRIP: ABNORMAL
WBC # BLD AUTO: 6.51 10*3/MM3 (ref 3.4–10.8)
WBC UR QL AUTO: ABNORMAL /HPF

## 2021-07-14 PROCEDURE — 80048 BASIC METABOLIC PNL TOTAL CA: CPT

## 2021-07-14 PROCEDURE — 81001 URINALYSIS AUTO W/SCOPE: CPT

## 2021-07-14 PROCEDURE — 36415 COLL VENOUS BLD VENIPUNCTURE: CPT

## 2021-07-14 PROCEDURE — 85025 COMPLETE CBC W/AUTO DIFF WBC: CPT

## 2021-07-14 PROCEDURE — 87086 URINE CULTURE/COLONY COUNT: CPT

## 2021-07-16 LAB — BACTERIA SPEC AEROBE CULT: NO GROWTH

## 2021-08-20 ENCOUNTER — OFFICE VISIT (OUTPATIENT)
Dept: ORTHOPEDIC SURGERY | Facility: CLINIC | Age: 81
End: 2021-08-20

## 2021-08-20 VITALS
WEIGHT: 185 LBS | SYSTOLIC BLOOD PRESSURE: 112 MMHG | HEART RATE: 65 BPM | HEIGHT: 62 IN | DIASTOLIC BLOOD PRESSURE: 69 MMHG | BODY MASS INDEX: 34.04 KG/M2
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
## 2021-08-20 DIAGNOSIS — S93.402A SPRAIN OF LEFT ANKLE, INITIAL ENCOUNTER: Primary | ICD-10-CM

## 2021-08-20 PROCEDURE — 99213 OFFICE O/P EST LOW 20 MIN: CPT | Performed by: PHYSICIAN ASSISTANT

## 2021-08-20 NOTE — PATIENT INSTRUCTIONS
Preventing Health Risks of Being Overweight  Maintaining a healthy body weight is an important part of your overall health. Your healthy body weight depends on your age, gender, and height. Being overweight puts you at risk for many health problems, including:  · Heart disease.  · Diabetes.  · Problems sleeping.  · Joint problems.  You can make changes to your diet and lifestyle to prevent these risks. Consider working with a health care provider or a dietitian to make these changes.  What nutrition changes can be made?    · Eat only as much as your body needs. In most cases, this is about 2,000 calories a day, but the amount varies depending on your height, gender, and activity level. Ask your health care provider how many calories you should have each day. Eating more than your body needs on a regular basis can cause you to become overweight or obese.  · Eat slowly, and stop eating when you feel full.  · Choose healthy foods, including:  ? Fruits and vegetables.  ? Lean meats.  ? Low-fat dairy products.  ? High-fiber foods, such as whole grains and beans.  ? Healthy snacks like vegetable sticks, a piece of fruit, or a small amount of yogurt or cheese.  · Avoid foods and drinks that are high in sugar, salt (sodium), saturated fat, or trans fat. This includes:  ? Many desserts such as candy, cookies, and ice cream.  ? Soda.  ? Fried foods.  ? Processed meats such as hot dogs or lunch meats.  ? Prepackaged snack foods.  What lifestyle changes can be made?    · Exercise for at least 150 minutes a week to prevent weight gain, or as often as recommended by your health care provider. Do moderate-intensity exercise, such as brisk walking.  ? Spread it out by exercising for 30 minutes 5 days a week, or in short 10-minute bursts several times a day.  · Find other ways to stay active and burn calories, such as yard work or a hobby that involves physical activity.  · Get at least 8 hours of sleep each night. When you are  well-rested, you are more likely to be active and make healthy choices during the day. To sleep better:  ? Try to go to bed and wake up at about the same time every day.  ? Keep your bedroom dark, quiet, and cool.  ? Make sure that your bed is comfortable.  ? Avoid stimulating activities, such as watching television or exercising, for at least one hour before bedtime.  Why are these changes important?  Eating healthy and being active helps you lose weight and prevent health problems caused by being overweight. Making these changes can also help you manage stress, feel better mentally, and connect with friends and family.  What can happen if changes are not made?  Being overweight can affect you for your entire life. You may develop joint or bone problems that make it painful or difficult for you to play sports or do activities you enjoy. Being overweight puts stress on your heart and lungs and can lead to medical problems like diabetes, heart disease, and sleeping problems.  Where to find support  You can get support for preventing health risks of being overweight from:  · Your health care provider or a dietitian. They can provide guidance about healthy eating and healthy lifestyle choices.  · Weight loss support groups, online or in-person.  Where to find more information  · MyPlate: www.choosemyplate.gov  ? This an online tool that provides personalized recommendations about foods to eat each day.  · The Centers for Disease Control and Prevention: www.cdc.gov/healthyweight  ? This resource gives tips for managing weight and having an active lifestyle.  Summary  · To prevent unhealthy weight gain, it is important to maintain a healthy diet high in vegetables and whole grains, exercise regularly, and get at least 8 hours of sleep each night.  · Making these changes helps prevent many long-term (chronic) health conditions that can shorten your life, such as diabetes, heart disease, and stroke.  This information is  not intended to replace advice given to you by your health care provider. Make sure you discuss any questions you have with your health care provider.  Document Revised: 04/15/2021 Document Reviewed: 04/15/2021  [email or web address] Patient Education © 2021 [employer]      Advance Care Planning and Advance Directives     You make decisions on a daily basis - decisions about where you want to live, your career, your home, your life. Perhaps one of the most important decisions you face is your choice for future medical care. Take time to talk with your family and your healthcare team and start planning today.  Advance Care Planning is a process that can help you:  · Understand possible future healthcare decisions in light of your own experiences  · Reflect on those decision in light of your goals and values  · Discuss your decisions with those closest to you and the healthcare professionals that care for you  · Make a plan by creating a document that reflects your wishes    Surrogate Decision Maker  In the event of a medical emergency, which has left you unable to communicate or to make your own decisions, you would need someone to make decisions for you.  It is important to discuss your preferences for medical treatment with this person while you are in good health.     Qualities of a surrogate decision maker:  • Willing to take on this role and responsibility  • Knows what you want for future medical care  • Willing to follow your wishes even if they don't agree with them  • Able to make difficult medical decisions under stressful circumstances    Advance Directives  These are legal documents you can create that will guide your healthcare team and decision maker(s) when needed. These documents can be stored in the electronic medical record.    · Living Will - a legal document to guide your care if you have a terminal condition or a serious illness and are unable to communicate. States vary by statute in document names/types,  but most forms may include one or more of the following:        -  Directions regarding life-prolonging treatments        -  Directions regarding artificially provided nutrition/hydration        -  Choosing a healthcare decision maker        -  Direction regarding organ/tissue donation    · Durable Power of  for Healthcare - this document names an -in-fact to make medical decisions for you, but it may also allow this person to make personal and financial decisions for you. Please seek the advice of an  if you need this type of document.    **Advance Directives are not required and no one may discriminate against you if you do not sign one.    Medical Orders  Many states allow specific forms/orders signed by your physician to record your wishes for medical treatment in your current state of health. This form, signed in personal communication with your physician, addresses resuscitation and other medical interventions that you may or may not want.      For more information or to schedule a time with a [facility] Advance Care Planning Facilitator contact: [email or web address] or call [phone] and someone will contact you directly.

## 2021-09-03 ENCOUNTER — OFFICE VISIT (OUTPATIENT)
Dept: ORTHOPEDIC SURGERY | Facility: CLINIC | Age: 81
End: 2021-09-03

## 2021-09-03 VITALS
BODY MASS INDEX: 34.04 KG/M2 | HEART RATE: 60 BPM | WEIGHT: 185 LBS | DIASTOLIC BLOOD PRESSURE: 64 MMHG | SYSTOLIC BLOOD PRESSURE: 139 MMHG | HEIGHT: 62 IN

## 2021-09-03 DIAGNOSIS — S82.832A NONDISPLACED FRACTURE OF PROXIMAL END OF LEFT FIBULA: Primary | ICD-10-CM
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
## 2021-09-03 DIAGNOSIS — S93.402A SPRAIN OF LEFT ANKLE, INITIAL ENCOUNTER: ICD-10-CM

## 2021-09-03 PROCEDURE — 99213 OFFICE O/P EST LOW 20 MIN: CPT | Performed by: PHYSICIAN ASSISTANT

## 2021-09-03 NOTE — PROGRESS NOTES
ORTHO FOLLOW UP       Subjective:    HPI:   Lynda Patterson is a 81 y.o. female who presents in follow-up for left ankle strain after fall on August 6.  She has been weightbearing as tolerated in a Cam walking boot.  She reports that her pain has improved, although she does continue to have pain with weightbearing.      Past Medical History:   Diagnosis Date   • Anxiety    • Depression    • DM2 (diabetes mellitus, type 2) (CMS/[facility])    • Gout    • Heart disease    • Hyperlipidemia    • Hypertension    • Kidney failure     Stage three kidney failure , abstraction from [facility]   • Macular degeneration        Past Surgical History:   Procedure Laterality Date   • CHOLECYSTECTOMY  1988   • TOTAL ABDOMINAL HYSTERECTOMY  1970       Social History     Occupational History   • Not on file   Tobacco Use   • Smoking status: Never Smoker   • Smokeless tobacco: Never Used   Vaping Use   • Vaping Use: Never used   Substance and Sexual Activity   • Alcohol use: No   • Drug use: No   • Sexual activity: Defer      The following portions of the patient's history were reviewed and updated as appropriate: allergies, current medications, past family history, past medical history, past social history, past surgical history and problem list.    Medications:    Current Outpatient Medications:   •  atorvastatin (LIPITOR) 20 MG tablet, Take 0.5 tablets by mouth every night at bedtime., Disp: 90 tablet, Rfl: 3  •  B Complex Vitamins (VITAMIN B COMPLEX) tablet, Take 1 tablet by mouth Daily., Disp: , Rfl:   •  fluticasone (FLONASE) 50 MCG/ACT nasal spray, 2 sprays into the nostril(s) as directed by provider Daily for 30 days. 1 spray in each nostril daily, Disp: 16 g, Rfl: 0  •  gabapentin (NEURONTIN) 300 MG capsule, TAKE ONE CAPSULE BY MOUTH EVERY NIGHT AT BEDTIME FOR NEUROPATHY, Disp: 90 capsule, Rfl: 0  •  glimepiride (AMARYL) 2 MG tablet, TAKE ONE TABLET BY MOUTH TWICE A DAY WITH MEALS, Disp: 180 tablet, Rfl: 1  •  glucose  "blood (ONE TOUCH ULTRA TEST) test strip, Test BS once a day E11.22 one touch ultra, Disp: 100 each, Rfl: 1  •  lisinopril (PRINIVIL,ZESTRIL) 10 MG tablet, Take 1 tablet by mouth Daily., Disp: , Rfl:   •  Lutein 20 MG tablet, LUTEIN 20 MG TABS, Disp: , Rfl:   •  metFORMIN (GLUCOPHAGE) 500 MG tablet, Take 1 tablet by mouth 2 (Two) Times a Day., Disp: , Rfl:   •  Multiple Vitamins-Minerals (ICAPS) tablet, ICAPS AREDS FORMULA TABS, Disp: , Rfl:   •  nitrofurantoin, macrocrystal-monohydrate, (Macrobid) 100 MG capsule, Take 1 capsule by mouth 2 (Two) Times a Day., Disp: 14 capsule, Rfl: 0  •  ONE TOUCH LANCETS misc, ONETOUCH LANCETS, Disp: , Rfl:   •  oxybutynin XL (Ditropan XL) 10 MG 24 hr tablet, Take 1 tablet by mouth Daily., Disp: 90 tablet, Rfl: 3  •  sertraline (ZOLOFT) 100 MG tablet, TAKE ONE TABLET BY MOUTH DAILY FOR DEPRESSION, Disp: 90 tablet, Rfl: 1    Allergies:  Allergies   Allergen Reactions   • Ciprofloxacin Nausea And Vomiting   • Penicillin G Nausea And Vomiting   • Sulfa Antibiotics Nausea And Vomiting       Review of Systems:  Gen -no fever, chills , sweats, headache   Eyes - no irritation or discharge   ENT -  no ear pain , runny nose , sore throat , difficulty swallowing   Resp - no cough , congestion , excessive expectoration   CVS - no chest pain , palpitations.   Abd - no pain , nausea , vomiting , diarrhea   Skin - no rash , lesions.   Neuro - no dizziness    Please see HPI for any other pertinent positives.  All other systems were reviewed and are negative.       Objective   Objective:    /64 (BP Location: Left arm, Patient Position: Sitting, Cuff Size: Large Adult)   Pulse 60   Ht 157.5 cm (62\")   Wt 83.9 kg (185 lb)   BMI 33.84 kg/m²     Physical Examination:  Alert, oriented, obese individual in no acute distress, ambulating with the assistance of a walker  Left lower extremity shows no erythema, rashes, or open skin lesions. There is a mild amount of swelling in the lower leg, which " has improved. It is grossly well aligned, and the patient is neurovascularly intact distally.  There is moderate tenderness to palpation over the proximal fibula and mild tenderness over the lateral ankle, as well as with ankle ROM, particularly inversion.  Ankle range of motion is within functional limits.         Imaging:  xrays obtained today  left Tib-Fib X-Ray  Indication: Pain, status post fall  2 Views  Findings: Shows slight periosteal reaction in the area of the proximal fibula typically seen with interval fracture healing when compared to previous imaging, no overt fracture line is seen.  Clinical correlation is needed.    Hardware appropriately positioned not applicable    yes prior studies available for comparison.    X-RAY was ordered and reviewed by INESSA Yo          Assessment:  1. Nondisplaced fracture of proximal end of left fibula    2. Sprain of left ankle, initial encounter    3. Fall from slip, trip, or stumble, initial encounter                 Plan:  Although there is no overt fracture line on imaging, there is some periosteal reaction indicating interval healing, which suggests a fracture.  These images were reviewed with Dr. Gordon today and he was in agreement.  She should continue the Cam walking boot and may continue to be weightbearing as tolerated.  We will plan to see her back in 4 weeks with repeat imaging.  She should call with any questions or concerns.             INESSA Yo  09/03/21  11:56 EDT    EMR Dragon/Transcription disclaimer:  Much of this encounter note is an electronic transcription/translation of spoken language to printed text. The electronic translation of spoken language may permit erroneous, or at times, nonsensical words or phrases to be inadvertently transcribed; Although I have reviewed the note for such errors, some may still exist.

## 2021-09-13 RX ORDER — BLOOD SUGAR DIAGNOSTIC
STRIP MISCELLANEOUS
Qty: 100 EACH | Refills: 1 | Status: SHIPPED | OUTPATIENT
Start: 2021-09-13

## 2021-10-01 ENCOUNTER — OFFICE VISIT (OUTPATIENT)
Dept: ORTHOPEDIC SURGERY | Facility: CLINIC | Age: 81
End: 2021-10-01

## 2021-10-01 VITALS
HEART RATE: 49 BPM | HEIGHT: 62 IN | WEIGHT: 185 LBS | BODY MASS INDEX: 34.04 KG/M2 | SYSTOLIC BLOOD PRESSURE: 125 MMHG | DIASTOLIC BLOOD PRESSURE: 61 MMHG

## 2021-10-01 DIAGNOSIS — S93.402A SPRAIN OF LEFT ANKLE, INITIAL ENCOUNTER: ICD-10-CM

## 2021-10-01 DIAGNOSIS — S82.832A NONDISPLACED FRACTURE OF PROXIMAL END OF LEFT FIBULA: Primary | ICD-10-CM

## 2021-10-01 PROCEDURE — 99213 OFFICE O/P EST LOW 20 MIN: CPT | Performed by: PHYSICIAN ASSISTANT

## 2021-10-01 NOTE — PROGRESS NOTES
ORTHO FOLLOW UP       Subjective:    HPI:   Lynda Patterson is a 81 y.o. female who presents in follow-up for left ankle sprain and proximal fibular fracture after fall on August 6.  She has been weightbearing as tolerated in a Cam walking boot.  She reports that her pain has resolved and that she is even walked around the house in the last few days without her Cam walking boot successfully without any pain.      Past Medical History:   Diagnosis Date   • Anxiety    • Depression    • DM2 (diabetes mellitus, type 2) ([facility])    • Gout    • Heart disease    • Hyperlipidemia    • Hypertension    • Kidney failure     Stage three kidney failure , abstraction from [facility]   • Macular degeneration    • Nondisplaced fracture of proximal end of left fibula 9/3/2021   • Sprain of left ankle, initial encounter 8/20/2021       Past Surgical History:   Procedure Laterality Date   • CHOLECYSTECTOMY  1988   • TOTAL ABDOMINAL HYSTERECTOMY  1970       Social History     Occupational History   • Not on file   Tobacco Use   • Smoking status: Never Smoker   • Smokeless tobacco: Never Used   Vaping Use   • Vaping Use: Never used   Substance and Sexual Activity   • Alcohol use: No   • Drug use: No   • Sexual activity: Defer      The following portions of the patient's history were reviewed and updated as appropriate: allergies, current medications, past family history, past medical history, past social history, past surgical history and problem list.    Medications:    Current Outpatient Medications:   •  atorvastatin (LIPITOR) 20 MG tablet, Take 0.5 tablets by mouth every night at bedtime., Disp: 90 tablet, Rfl: 3  •  B Complex Vitamins (VITAMIN B COMPLEX) tablet, Take 1 tablet by mouth Daily., Disp: , Rfl:   •  gabapentin (NEURONTIN) 300 MG capsule, TAKE ONE CAPSULE BY MOUTH EVERY NIGHT AT BEDTIME FOR NEUROPATHY, Disp: 90 capsule, Rfl: 0  •  glimepiride (AMARYL) 2 MG tablet, TAKE ONE TABLET BY MOUTH TWICE A DAY WITH MEALS,  "Disp: 180 tablet, Rfl: 1  •  lisinopril (PRINIVIL,ZESTRIL) 10 MG tablet, Take 1 tablet by mouth Daily., Disp: , Rfl:   •  Lutein 20 MG tablet, LUTEIN 20 MG TABS, Disp: , Rfl:   •  metFORMIN (GLUCOPHAGE) 500 MG tablet, Take 1 tablet by mouth 2 (Two) Times a Day., Disp: , Rfl:   •  Multiple Vitamins-Minerals (ICAPS) tablet, ICAPS AREDS FORMULA TABS, Disp: , Rfl:   •  nitrofurantoin, macrocrystal-monohydrate, (Macrobid) 100 MG capsule, Take 1 capsule by mouth 2 (Two) Times a Day., Disp: 14 capsule, Rfl: 0  •  ONE TOUCH LANCETS misc, ONETOUCH LANCETS, Disp: , Rfl:   •  OneTouch Ultra test strip, TEST BLOOD SUGAR ONCE DAILY, Disp: 100 each, Rfl: 1  •  oxybutynin XL (Ditropan XL) 10 MG 24 hr tablet, Take 1 tablet by mouth Daily., Disp: 90 tablet, Rfl: 3  •  sertraline (ZOLOFT) 100 MG tablet, TAKE ONE TABLET BY MOUTH DAILY FOR DEPRESSION, Disp: 90 tablet, Rfl: 1  •  fluticasone (FLONASE) 50 MCG/ACT nasal spray, 2 sprays into the nostril(s) as directed by provider Daily for 30 days. 1 spray in each nostril daily, Disp: 16 g, Rfl: 0    Allergies:  Allergies   Allergen Reactions   • Ciprofloxacin Nausea And Vomiting   • Penicillin G Nausea And Vomiting   • Sulfa Antibiotics Nausea And Vomiting       Review of Systems:  Gen -no fever, chills , sweats, headache   Eyes - no irritation or discharge   ENT -  no ear pain , runny nose , sore throat , difficulty swallowing   Resp - no cough , congestion , excessive expectoration   CVS - no chest pain , palpitations.   Abd - no pain , nausea , vomiting , diarrhea   Skin - no rash , lesions.   Neuro - no dizziness    Please see HPI for any other pertinent positives.  All other systems were reviewed and are negative.       Objective   Objective:    /61 (BP Location: Left arm, Patient Position: Sitting, Cuff Size: Large Adult)   Pulse (!) 49   Ht 157.5 cm (62\")   Wt 83.9 kg (185 lb)   BMI 33.84 kg/m²     Physical Examination:  Alert, oriented, obese individual in no acute " distress, ambulating with the assistance of a cane  Left lower extremity shows no erythema, rashes, or open skin lesions. There is a minimal amount of swelling in the lower leg, which has improved. It is grossly well aligned, and the patient is neurovascularly intact distally.  There is no tenderness to palpation over the proximal fibula or ankle.  Ankle range of motion is within functional limits.         Imaging:  xrays obtained today  left Tib-Fib X-Ray    Date of exam: 10/1/2021    Indication: Proximal fibula fracture follow-up    2 Views    Findings: Shows interval healing of the nondisplaced proximal fibular fracture, which remains in good alignment when compared to previous imaging.     Hardware appropriately positioned not applicable     yes prior studies available for comparison.     X-RAY was ordered and reviewed by INESSA Yo            Assessment:  1. Nondisplaced fracture of proximal end of left fibula    2. Sprain of left ankle, initial encounter                 Plan:  She has great healing for a woman of her age.  We will discontinue the Cam walking boot at this time and she may be weightbearing as tolerated.  I have given her some home ankle exercises to begin, and I will plan to see her back in 3 weeks for recheck.  She should call with any questions or concerns.             INESSA Yo  10/01/21  11:48 EDT    EMR Dragon/Transcription disclaimer:  Much of this encounter note is an electronic transcription/translation of spoken language to printed text. The electronic translation of spoken language may permit erroneous, or at times, nonsensical words or phrases to be inadvertently transcribed; Although I have reviewed the note for such errors, some may still exist.

## 2021-10-04 RX ORDER — GABAPENTIN 300 MG/1
CAPSULE ORAL
Qty: 90 CAPSULE | Refills: 0 | Status: SHIPPED | OUTPATIENT
Start: 2021-10-04 | End: 2022-02-13

## 2021-10-20 ENCOUNTER — OFFICE VISIT (OUTPATIENT)
Dept: FAMILY MEDICINE CLINIC | Facility: CLINIC | Age: 81
End: 2021-10-20

## 2021-10-20 ENCOUNTER — LAB (OUTPATIENT)
Dept: FAMILY MEDICINE CLINIC | Facility: CLINIC | Age: 81
End: 2021-10-20

## 2021-10-20 VITALS
HEART RATE: 56 BPM | HEIGHT: 62 IN | TEMPERATURE: 97.1 F | OXYGEN SATURATION: 97 % | DIASTOLIC BLOOD PRESSURE: 73 MMHG | WEIGHT: 192 LBS | SYSTOLIC BLOOD PRESSURE: 144 MMHG | BODY MASS INDEX: 35.33 KG/M2

## 2021-10-20 DIAGNOSIS — Z00.01 ENCOUNTER FOR GENERAL ADULT MEDICAL EXAMINATION WITH ABNORMAL FINDINGS: Primary | ICD-10-CM

## 2021-10-20 DIAGNOSIS — E11.22 TYPE 2 DIABETES MELLITUS WITH STAGE 3 CHRONIC KIDNEY DISEASE, WITHOUT LONG-TERM CURRENT USE OF INSULIN, UNSPECIFIED WHETHER STAGE 3A OR 3B CKD (HCC): ICD-10-CM

## 2021-10-20 DIAGNOSIS — I10 ESSENTIAL (PRIMARY) HYPERTENSION: ICD-10-CM

## 2021-10-20 DIAGNOSIS — N18.30 TYPE 2 DIABETES MELLITUS WITH STAGE 3 CHRONIC KIDNEY DISEASE, WITHOUT LONG-TERM CURRENT USE OF INSULIN, UNSPECIFIED WHETHER STAGE 3A OR 3B CKD (HCC): ICD-10-CM

## 2021-10-20 DIAGNOSIS — E66.9 OBESITY (BMI 30-39.9): ICD-10-CM

## 2021-10-20 DIAGNOSIS — E78.2 MIXED HYPERLIPIDEMIA: ICD-10-CM

## 2021-10-20 LAB
ALBUMIN SERPL-MCNC: 4.4 G/DL (ref 3.5–5.2)
ALBUMIN/GLOB SERPL: 1.4 G/DL
ALP SERPL-CCNC: 73 U/L (ref 39–117)
ALT SERPL W P-5'-P-CCNC: 10 U/L (ref 1–33)
ANION GAP SERPL CALCULATED.3IONS-SCNC: 12.4 MMOL/L (ref 5–15)
AST SERPL-CCNC: 16 U/L (ref 1–32)
BILIRUB SERPL-MCNC: 0.3 MG/DL (ref 0–1.2)
BUN SERPL-MCNC: 31 MG/DL (ref 8–23)
BUN/CREAT SERPL: 20.1 (ref 7–25)
CALCIUM SPEC-SCNC: 9.3 MG/DL (ref 8.6–10.5)
CHLORIDE SERPL-SCNC: 105 MMOL/L (ref 98–107)
CHOLEST SERPL-MCNC: 213 MG/DL (ref 0–200)
CO2 SERPL-SCNC: 24.6 MMOL/L (ref 22–29)
CREAT SERPL-MCNC: 1.54 MG/DL (ref 0.57–1)
GFR SERPL CREATININE-BSD FRML MDRD: 32 ML/MIN/1.73
GLOBULIN UR ELPH-MCNC: 3.2 GM/DL
GLUCOSE SERPL-MCNC: 98 MG/DL (ref 65–99)
HBA1C MFR BLD: 5.7 % (ref 3.5–5.6)
HDLC SERPL-MCNC: 60 MG/DL (ref 40–60)
LDLC SERPL CALC-MCNC: 138 MG/DL (ref 0–100)
LDLC/HDLC SERPL: 2.27 {RATIO}
POTASSIUM SERPL-SCNC: 4.8 MMOL/L (ref 3.5–5.2)
PROT SERPL-MCNC: 7.6 G/DL (ref 6–8.5)
SODIUM SERPL-SCNC: 142 MMOL/L (ref 136–145)
TRIGL SERPL-MCNC: 85 MG/DL (ref 0–150)
VLDLC SERPL-MCNC: 15 MG/DL (ref 5–40)

## 2021-10-20 PROCEDURE — 83036 HEMOGLOBIN GLYCOSYLATED A1C: CPT | Performed by: FAMILY MEDICINE

## 2021-10-20 PROCEDURE — 99213 OFFICE O/P EST LOW 20 MIN: CPT | Performed by: FAMILY MEDICINE

## 2021-10-20 PROCEDURE — G0439 PPPS, SUBSEQ VISIT: HCPCS | Performed by: FAMILY MEDICINE

## 2021-10-20 PROCEDURE — 80061 LIPID PANEL: CPT | Performed by: FAMILY MEDICINE

## 2021-10-20 PROCEDURE — 1160F RVW MEDS BY RX/DR IN RCRD: CPT | Performed by: FAMILY MEDICINE

## 2021-10-20 PROCEDURE — 80053 COMPREHEN METABOLIC PANEL: CPT | Performed by: FAMILY MEDICINE

## 2021-10-20 PROCEDURE — 1170F FXNL STATUS ASSESSED: CPT | Performed by: FAMILY MEDICINE

## 2021-10-20 PROCEDURE — 36415 COLL VENOUS BLD VENIPUNCTURE: CPT | Performed by: FAMILY MEDICINE

## 2021-10-20 RX ORDER — LISINOPRIL 10 MG/1
10 TABLET ORAL DAILY
Qty: 90 TABLET | Refills: 3 | Status: SHIPPED | OUTPATIENT
Start: 2021-10-20 | End: 2022-07-22

## 2021-10-20 RX ORDER — SERTRALINE HYDROCHLORIDE 100 MG/1
100 TABLET, FILM COATED ORAL DAILY
Qty: 90 TABLET | Refills: 3 | Status: SHIPPED | OUTPATIENT
Start: 2021-10-20 | End: 2021-11-02

## 2021-10-20 NOTE — PROGRESS NOTES
The ABCs of the Annual Wellness Visit  Subsequent Medicare Wellness Visit    Chief Complaint   Patient presents with   • Medicare Wellness-subsequent   • Fall     in the yard her leg gave out 8/17. saw ortho and put on a brace, stopped using it 2 weeks ago.       Subjective    History of Present Illness:  Lynda Patterson is a 81 y.o. female who presents for a Subsequent Medicare Wellness Visit.  She also needs follow-up on blood pressure, cholesterol, diabetes  Nephrology follows her chronic kidney disease  Using her cane  Saw ortho for the fall and wore a brace for a bit  BS running 130  Eyes - [facility]. Wilson in [place]   is with her  Needs refills on her medication    The following portions of the patient's history were reviewed and   updated as appropriate: allergies, current medications, past family history, past medical history, past social history, past surgical history and problem list.    Compared to one year ago, the patient feels her physical   health is worse.    Compared to one year ago, the patient feels her mental   health is the same.    Recent Hospitalizations:  She was not admitted to the hospital during the last year.       Current Medical Providers:  Patient Care Team:  Jenn Tirado MD as PCP - General  Jenn Tirado MD as PCP - Family Medicine    Outpatient Medications Prior to Visit   Medication Sig Dispense Refill   • atorvastatin (LIPITOR) 20 MG tablet Take 0.5 tablets by mouth every night at bedtime. 90 tablet 3   • B Complex Vitamins (VITAMIN B COMPLEX) tablet Take 1 tablet by mouth Daily.     • gabapentin (NEURONTIN) 300 MG capsule TAKE ONE CAPSULE BY MOUTH EVERY NIGHT AT BEDTIME 90 capsule 0   • glimepiride (AMARYL) 2 MG tablet TAKE ONE TABLET BY MOUTH TWICE A DAY WITH MEALS 180 tablet 1   • Lutein 20 MG tablet LUTEIN 20 MG TABS     • Multiple Vitamins-Minerals (ICAPS) tablet ICAPS AREDS FORMULA TABS     • ONE TOUCH LANCETS misc ONETOUCH LANCETS     •  OneTouch Ultra test strip TEST BLOOD SUGAR ONCE DAILY 100 each 1   • oxybutynin XL (Ditropan XL) 10 MG 24 hr tablet Take 1 tablet by mouth Daily. 90 tablet 3   • lisinopril (PRINIVIL,ZESTRIL) 10 MG tablet Take 1 tablet by mouth Daily.     • metFORMIN (GLUCOPHAGE) 500 MG tablet Take 1 tablet by mouth 2 (Two) Times a Day.     • nitrofurantoin, macrocrystal-monohydrate, (Macrobid) 100 MG capsule Take 1 capsule by mouth 2 (Two) Times a Day. 14 capsule 0   • sertraline (ZOLOFT) 100 MG tablet TAKE ONE TABLET BY MOUTH DAILY FOR DEPRESSION 90 tablet 1   • fluticasone (FLONASE) 50 MCG/ACT nasal spray 2 sprays into the nostril(s) as directed by provider Daily for 30 days. 1 spray in each nostril daily 16 g 0     No facility-administered medications prior to visit.       No opioid medication identified on active medication list. I have reviewed chart for other potential  high risk medication/s and harmful drug interactions in the elderly.          Aspirin is not on active medication list.  Aspirin use is not indicated based on review of current medical condition/s. Risk of harm outweighs potential benefits.  .    Patient Active Problem List   Diagnosis   • Mixed hyperlipidemia   • Macrocytic anemia   • Type 2 diabetes mellitus with stage 3 chronic kidney disease, without long-term current use of insulin ([facility])   • Acute low back pain   • Anxiety disorder   • Carotid artery stenosis   • Chronic kidney disease, stage III (moderate) ([facility])   • Depressive disorder   • Disorder associated with type 2 diabetes mellitus (HCC)   • Encounter for general adult medical examination without abnormal findings   • Fatigue   • Gastroesophageal reflux disease   • Gout   • Hyperkalemia   • Hypertension   • Osteoarthritis   • Postherpetic neuralgia   • Shingles   • Urinary incontinence   • Vitamin D deficiency   • Low back pain   • Essential (primary) hypertension   • Type 2 diabetes mellitus with diabetic chronic kidney disease (HCC)   •  "Degenerative drusen   • Hypermetropia   • Pseudophakia   • Sprain of left ankle, initial encounter   • Nondisplaced fracture of proximal end of left fibula   • Encounter for general adult medical examination with abnormal findings   • Obesity (BMI 30-39.9)     Advance Care Planning  Advance Directive is not on file.  ACP discussion was held with the patient during this visit. Patient has an advance directive (not in EMR), copy requested.    Review of Systems   Constitutional: Negative.    Respiratory: Negative.    Cardiovascular: Negative.    Gastrointestinal: Negative for nausea and vomiting.   Endocrine: Negative.    Neurological: Negative for syncope, light-headedness and numbness.   Psychiatric/Behavioral: Negative.         Objective    Vitals:    10/20/21 0859   BP: 144/73   BP Location: Left arm   Patient Position: Sitting   Cuff Size: Large Adult   Pulse: 56   Temp: 97.1 °F (36.2 °C)   TempSrc: Temporal   SpO2: 97%   Weight: 87.1 kg (192 lb)   Height: 157.5 cm (62\")     BMI Readings from Last 1 Encounters:   10/20/21 35.12 kg/m²   BMI is above normal parameters. Recommendations include: nutrition counseling    Does the patient have evidence of cognitive impairment? No   MMSE done 30/30  Physical Exam  Vitals and nursing note reviewed.   Constitutional:       General: She is not in acute distress.     Appearance: Normal appearance. She is well-developed and well-groomed. She is obese.   HENT:      Head: Normocephalic and atraumatic.   Neck:      Thyroid: No thyromegaly.   Cardiovascular:      Rate and Rhythm: Normal rate and regular rhythm.      Heart sounds: Normal heart sounds. No murmur heard.  No friction rub. No gallop.    Pulmonary:      Effort: Pulmonary effort is normal. No respiratory distress.      Breath sounds: Normal breath sounds. No wheezing or rales.   Musculoskeletal:      Cervical back: Neck supple.      Right lower leg: No edema.      Left lower leg: No edema.   Lymphadenopathy:      " Cervical: No cervical adenopathy.   Skin:     General: Skin is warm and dry.   Neurological:      Mental Status: She is alert and oriented to person, place, and time.   Psychiatric:         Mood and Affect: Mood normal.         Behavior: Behavior is cooperative.                 HEALTH RISK ASSESSMENT    Smoking Status:  Social History     Tobacco Use   Smoking Status Never Smoker   Smokeless Tobacco Never Used     Alcohol Consumption:  Social History     Substance and Sexual Activity   Alcohol Use No     Fall Risk Screen:    ZARINAADI Fall Risk Assessment was completed, and patient is at LOW risk for falls.Assessment completed on:10/20/2021    Depression Screening:  PHQ-2/PHQ-9 Depression Screening 10/20/2021   Little interest or pleasure in doing things 0   Feeling down, depressed, or hopeless 0   Total Score 0       Health Habits and Functional and Cognitive Screening:  Functional & Cognitive Status 10/20/2021   Do you have difficulty preparing food and eating? No   Do you have difficulty bathing yourself, getting dressed or grooming yourself? No   Do you have difficulty using the toilet? No   Do you have difficulty moving around from place to place? Yes   Do you have trouble with steps or getting out of a bed or a chair? Yes   Current Diet Limited Junk Food   Dental Exam Up to date   Eye Exam Up to date   Exercise (times per week) 0 times per week   Current Exercises Include No Regular Exercise   Do you need help using the phone?  No   Are you deaf or do you have serious difficulty hearing?  No   Do you need help with transportation? No   Do you need help shopping? No   Do you need help preparing meals?  No   Do you need help with housework?  No   Do you need help with laundry? No   Do you need help taking your medications? No   Do you need help managing money? No   Do you ever drive or ride in a car without wearing a seat belt? No   Have you felt unusual stress, anger or loneliness in the last month? No   Who do  you live with? Spouse   If you need help, do you have trouble finding someone available to you? No   Have you been bothered in the last four weeks by sexual problems? No   Do you have difficulty concentrating, remembering or making decisions? Yes       Age-appropriate Screening Schedule:  Refer to the list below for future screening recommendations based on patient's age, sex and/or medical conditions. Orders for these recommended tests are listed in the plan section. The patient has been provided with a written plan.    Health Maintenance   Topic Date Due   • TDAP/TD VACCINES (1 - Tdap) Never done   • ZOSTER VACCINE (2 of 3) 01/14/2014   • DXA SCAN  12/02/2015   • DIABETIC EYE EXAM  03/03/2021   • INFLUENZA VACCINE  08/01/2021   • HEMOGLOBIN A1C  10/28/2021   • LIPID PANEL  04/28/2022   • URINE MICROALBUMIN  Discontinued              Assessment/Plan   CMS Preventative Services Quick Reference  Risk Factors Identified During Encounter  Obesity/Overweight   The above risks/problems have been discussed with the patient.  Follow up actions/plans if indicated are seen below in the Assessment/Plan Section.  Pertinent information has been shared with the patient in the After Visit Summary.    Diagnoses and all orders for this visit:    1. Encounter for general adult medical examination with abnormal findings (Primary)    2. Mixed hyperlipidemia  -     Comprehensive Metabolic Panel  -     Lipid Panel; Future    3. Essential (primary) hypertension  -     Comprehensive Metabolic Panel  -     Lipid Panel; Future    4. Type 2 diabetes mellitus with stage 3 chronic kidney disease, without long-term current use of insulin, unspecified whether stage 3a or 3b CKD (HCC)  -     Comprehensive Metabolic Panel  -     Lipid Panel; Future  -     Hemoglobin A1c    5. Obesity (BMI 30-39.9)    Other orders  -     sertraline (ZOLOFT) 100 MG tablet; Take 1 tablet by mouth Daily. for depression  Dispense: 90 tablet; Refill: 3  -     metFORMIN  (Glucophage) 500 MG tablet; Take 1 tablet by mouth 2 (Two) Times a Day.  Dispense: 180 tablet; Refill: 3  -     lisinopril (PRINIVIL,ZESTRIL) 10 MG tablet; Take 1 tablet by mouth Daily.  Dispense: 90 tablet; Refill: 3        Follow Up:   Return in about 6 months (around 4/20/2022) for Recheck.     An After Visit Summary and PPPS were made available to the patient.               Was counseled on the need for weight loss and dietary compliance  She has had her flu shot and her Covid vaccines and her Pneumovax  She is up-to-date on mammogram  Blood pressures controlled  Medications were refilled  I will try to get the report from her most recent eye exam  Labs including CMP lipid and A1c were ordered  She will continue to see nephrology for her chronic kidney disease  I will see her back in 6 months  She was counseled on the need to continue using her cane and encouraged to take her time when she is walking

## 2021-10-22 ENCOUNTER — OFFICE VISIT (OUTPATIENT)
Dept: ORTHOPEDIC SURGERY | Facility: CLINIC | Age: 81
End: 2021-10-22

## 2021-10-22 VITALS
SYSTOLIC BLOOD PRESSURE: 145 MMHG | HEART RATE: 53 BPM | WEIGHT: 192 LBS | HEIGHT: 62 IN | BODY MASS INDEX: 35.33 KG/M2 | DIASTOLIC BLOOD PRESSURE: 77 MMHG

## 2021-10-22 DIAGNOSIS — S93.402A SPRAIN OF LEFT ANKLE, INITIAL ENCOUNTER: ICD-10-CM

## 2021-10-22 DIAGNOSIS — S82.832A NONDISPLACED FRACTURE OF PROXIMAL END OF LEFT FIBULA: Primary | ICD-10-CM

## 2021-10-22 DIAGNOSIS — E66.9 OBESITY (BMI 30-39.9): ICD-10-CM

## 2021-10-22 PROCEDURE — 99212 OFFICE O/P EST SF 10 MIN: CPT | Performed by: PHYSICIAN ASSISTANT

## 2021-10-22 NOTE — PROGRESS NOTES
ORTHO FOLLOW UP       Subjective:    HPI:   Lynda Patterson is a 81 y.o. female who presents in follow-up for left ankle sprain and proximal fibular fracture after fall on August 6.  She has been weightbearing as tolerated in a regular shoe, as well as doing home exercises.  She reports that she is doing extremely well with little to no pain.      Past Medical History:   Diagnosis Date   • Anxiety    • Depression    • DM2 (diabetes mellitus, type 2) ([facility])    • Gout    • Heart disease    • Hyperlipidemia    • Hypertension    • Kidney failure     Stage three kidney failure , abstraction from [facility]   • Macular degeneration    • Nondisplaced fracture of proximal end of left fibula 9/3/2021   • Sprain of left ankle, initial encounter 8/20/2021       Past Surgical History:   Procedure Laterality Date   • CHOLECYSTECTOMY  1988   • TOTAL ABDOMINAL HYSTERECTOMY  1970       Social History     Occupational History   • Not on file   Tobacco Use   • Smoking status: Never Smoker   • Smokeless tobacco: Never Used   Vaping Use   • Vaping Use: Never used   Substance and Sexual Activity   • Alcohol use: No   • Drug use: No   • Sexual activity: Defer      The following portions of the patient's history were reviewed and updated as appropriate: allergies, current medications, past family history, past medical history, past social history, past surgical history and problem list.    Medications:    Current Outpatient Medications:   •  atorvastatin (LIPITOR) 20 MG tablet, Take 0.5 tablets by mouth every night at bedtime., Disp: 90 tablet, Rfl: 3  •  B Complex Vitamins (VITAMIN B COMPLEX) tablet, Take 1 tablet by mouth Daily., Disp: , Rfl:   •  gabapentin (NEURONTIN) 300 MG capsule, TAKE ONE CAPSULE BY MOUTH EVERY NIGHT AT BEDTIME, Disp: 90 capsule, Rfl: 0  •  glimepiride (AMARYL) 2 MG tablet, TAKE ONE TABLET BY MOUTH TWICE A DAY WITH MEALS, Disp: 180 tablet, Rfl: 1  •  lisinopril (PRINIVIL,ZESTRIL) 10 MG tablet, Take 1  "tablet by mouth Daily., Disp: 90 tablet, Rfl: 3  •  Lutein 20 MG tablet, LUTEIN 20 MG TABS, Disp: , Rfl:   •  metFORMIN (Glucophage) 500 MG tablet, Take 1 tablet by mouth 2 (Two) Times a Day., Disp: 180 tablet, Rfl: 3  •  Multiple Vitamins-Minerals (ICAPS) tablet, ICAPS AREDS FORMULA TABS, Disp: , Rfl:   •  ONE TOUCH LANCETS misc, ONETOUCH LANCETS, Disp: , Rfl:   •  OneTouch Ultra test strip, TEST BLOOD SUGAR ONCE DAILY, Disp: 100 each, Rfl: 1  •  oxybutynin XL (Ditropan XL) 10 MG 24 hr tablet, Take 1 tablet by mouth Daily., Disp: 90 tablet, Rfl: 3  •  sertraline (ZOLOFT) 100 MG tablet, Take 1 tablet by mouth Daily. for depression, Disp: 90 tablet, Rfl: 3  •  fluticasone (FLONASE) 50 MCG/ACT nasal spray, 2 sprays into the nostril(s) as directed by provider Daily for 30 days. 1 spray in each nostril daily, Disp: 16 g, Rfl: 0    Allergies:  Allergies   Allergen Reactions   • Ciprofloxacin Nausea And Vomiting   • Penicillin G Nausea And Vomiting   • Sulfa Antibiotics Nausea And Vomiting       Review of Systems:  Gen -no fever, chills , sweats, headache   Eyes - no irritation or discharge   ENT -  no ear pain , runny nose , sore throat , difficulty swallowing   Resp - no cough , congestion , excessive expectoration   CVS - no chest pain , palpitations.   Abd - no pain , nausea , vomiting , diarrhea   Skin - no rash , lesions.   Neuro - no dizziness    Please see HPI for any other pertinent positives.  All other systems were reviewed and are negative.       Objective   Objective:    /77 (BP Location: Left arm, Patient Position: Sitting, Cuff Size: Large Adult)   Pulse 53   Ht 157.5 cm (62\")   Wt 87.1 kg (192 lb)   BMI 35.12 kg/m²     Physical Examination:  Alert, oriented, obese individual in no acute distress, ambulating with the assistance of a cane  Left lower extremity shows no erythema, rashes, or open skin lesions. There is a minimal amount of swelling in the lower leg.  It is grossly well aligned, and " the patient is neurovascularly intact distally.  There is no tenderness to palpation over the proximal fibula or ankle.  Knee range of motion is about 0-120.  ankle range of motion is within functional limits.         Imaging:              Assessment:  1. Nondisplaced fracture of proximal end of left fibula    2. Sprain of left ankle, initial encounter    3. Obesity (BMI 30-39.9)                 Plan:  She may follow-up as needed.             INESSA Yo  10/22/21  15:00 EDT    EMR Dragon/Transcription disclaimer:  Much of this encounter note is an electronic transcription/translation of spoken language to printed text. The electronic translation of spoken language may permit erroneous, or at times, nonsensical words or phrases to be inadvertently transcribed; Although I have reviewed the note for such errors, some may still exist.

## 2021-11-02 RX ORDER — SERTRALINE HYDROCHLORIDE 100 MG/1
TABLET, FILM COATED ORAL
Qty: 90 TABLET | Refills: 1 | Status: SHIPPED | OUTPATIENT
Start: 2021-11-02 | End: 2023-01-17

## 2021-11-02 RX ORDER — GLIMEPIRIDE 2 MG/1
TABLET ORAL
Qty: 180 TABLET | Refills: 1 | Status: SHIPPED | OUTPATIENT
Start: 2021-11-02 | End: 2022-07-22

## 2021-12-10 ENCOUNTER — TELEPHONE (OUTPATIENT)
Dept: FAMILY MEDICINE CLINIC | Facility: CLINIC | Age: 81
End: 2021-12-10

## 2021-12-10 DIAGNOSIS — R29.6 FREQUENT FALLS: ICD-10-CM

## 2021-12-10 DIAGNOSIS — E11.22 TYPE 2 DIABETES MELLITUS WITH STAGE 3 CHRONIC KIDNEY DISEASE, WITHOUT LONG-TERM CURRENT USE OF INSULIN, UNSPECIFIED WHETHER STAGE 3A OR 3B CKD (HCC): ICD-10-CM

## 2021-12-10 DIAGNOSIS — N18.30 TYPE 2 DIABETES MELLITUS WITH STAGE 3 CHRONIC KIDNEY DISEASE, WITHOUT LONG-TERM CURRENT USE OF INSULIN, UNSPECIFIED WHETHER STAGE 3A OR 3B CKD (HCC): ICD-10-CM

## 2021-12-10 DIAGNOSIS — M19.90 OSTEOARTHRITIS, UNSPECIFIED OSTEOARTHRITIS TYPE, UNSPECIFIED SITE: Primary | ICD-10-CM

## 2021-12-10 DIAGNOSIS — R26.89 BALANCE DISORDER: ICD-10-CM

## 2021-12-10 NOTE — TELEPHONE ENCOUNTER
Pt called to request order for PT. She has fallen twice in the grocery store and hit her head yesterday. Pt says that she feels fine, but would like order for PT for strengthening.

## 2021-12-27 ENCOUNTER — TELEPHONE (OUTPATIENT)
Dept: FAMILY MEDICINE CLINIC | Facility: CLINIC | Age: 81
End: 2021-12-27

## 2022-01-04 ENCOUNTER — TREATMENT (OUTPATIENT)
Dept: PHYSICAL THERAPY | Facility: CLINIC | Age: 82
End: 2022-01-04

## 2022-01-04 DIAGNOSIS — R53.1 WEAKNESS: ICD-10-CM

## 2022-01-04 DIAGNOSIS — R26.89 BALANCE DISORDER: Primary | ICD-10-CM

## 2022-01-04 PROCEDURE — 97162 PT EVAL MOD COMPLEX 30 MIN: CPT | Performed by: PHYSICAL THERAPIST

## 2022-01-04 PROCEDURE — 97110 THERAPEUTIC EXERCISES: CPT | Performed by: PHYSICAL THERAPIST

## 2022-01-04 NOTE — PROGRESS NOTES
Physical Therapy Initial Evaluation and Plan of Care    Patient: Lynda Patterson   : 1940  Diagnosis/ICD-10 Code:  Balance disorder [R26.89]  Referring practitioner: Jenn Oro*    Subjective Evaluation    History of Present Illness  Mechanism of injury: HR: 57 BPM  O2 saturation: 98 %  BP: 130/76 mmHG    Hx of type II diabetes, kidney disease, left fibular fracture - see MR for full history    Subjective comment: Patient is a 81 year old female who presents with a diagnosis of weakness and balance disorders.  She reports 1 year history of decreased balance/weakness with history of recent falls and fracture in left lower leg.  She reports she has become less active per fear of falling and pain in her back.  She reports she usually uses a cane but has began to use a walker at times as well.   Patient Occupation: Retired Quality of life: good    Pain  Current pain ratin  At best pain ratin  At worst pain rating: 10  Pain location: Lower back.  Quality: burning, dull ache and sharp  Relieving factors: change in position  Aggravating factors: prolonged positioning  Progression: no change    Social Support  Lives in: one-story house  Lives with: spouse             Objective          Strength/Myotome Testing     Left Hip   Planes of Motion   Flexion: 4-    Right Hip   Planes of Motion   Flexion: 4+    Left Knee   Flexion: 4+  Extension: 4+    Right Knee   Flexion: 5  Extension: 5    Left Ankle/Foot   Dorsiflexion: 5    Right Ankle/Foot   Dorsiflexion: 5    Ambulation     Comments   3 minute walk test - 230 ft. - SBA/CGA - quad cane     Functional Assessment     Comments  TUG - 28.5 seconds   Five Time Sit to Stand - 25.12 seconds - with use of UE   Single leg balance - R/L - unable w/o 1 UE support - more limitation in L stance with hip AD noted even with UE support           Assessment & Plan     Assessment  Impairments: abnormal gait, abnormal or restricted ROM, activity intolerance,  impaired balance, impaired physical strength, lacks appropriate home exercise program, pain with function, safety issue and weight-bearing intolerance  Functional Limitations: carrying objects, lifting, walking, pushing and standing  Assessment details: Presents with limits in functional LE strength, balance, gait quality and tolerance with reliance on assistive device with recent history of falls, lower back pain limiting home activity and self care.  She would benefit skilled PT to address the above and restore to highest level of prior function safely.   Prognosis: good    Goals  Plan Goals: ST. Independent and compliant with HEP over 2 weeks.   2. Improved gait mechanics with more continuous stepping and improved foot clearance over 2 weeks   3. To tolerate progression of balance/LE strengthening over 2 weeks w/o increased back pain over 2 weeks.     LT. 3 Minute walk test distance improved 20% or greater over 8 weeks or less.  2. Five Time Sit to Stand time improved to 18 seconds or less over 8 weeks.   3. To report no falls over 8 weeks.   4. TUG time improved to 18 second or less within 8 weeks.   5. To report 50% or greater improvement in ability for ADL's per improved safety and decreased lower back pain over 8 weeks.     Plan  Therapy options: will be seen for skilled therapy services  Planned modality interventions: cryotherapy and thermotherapy (hydrocollator packs)  Planned therapy interventions: manual therapy, soft tissue mobilization, flexibility, functional ROM exercises, strengthening, stretching, home exercise program, therapeutic activities and transfer training  Frequency: 2x week  Duration in weeks: 8  Treatment plan discussed with: patient          Timed:       ;     Therapeutic Exercise:    10     mins  [id number];       Un-Timed:  Mod Eval     40     Mins  [id number]      Timed Treatment:   10   mins   Total Treatment:     50   mins          PT SIGNATURE: Rory Wade PT, DPT   [place]  #[id number]    DATE TREATMENT INITIATED: 1/4/2022    Medicare Initial Certification  Certification Period: 4/4/2022  I certify that the therapy services are furnished while this patient is under my care.  The services outlined above are required by this patient, and will be reviewed every 90 days.     PHYSICIAN: Jenn Tirado MD      DATE:     Please sign and return via fax to [phone].. Thank you, [facility].

## 2022-01-05 NOTE — TELEPHONE ENCOUNTER
Just for taking her to doctors visits. He is trying to plan ahead so that e can get dates covered for them.

## 2022-01-05 NOTE — TELEPHONE ENCOUNTER
Sorry for the delay in getting this [place] paper taken care of.  Find out if this is for being able to take her to doctor's visits or if there is something else that he is needing the time associated with her

## 2022-02-13 RX ORDER — GABAPENTIN 300 MG/1
CAPSULE ORAL
Qty: 270 CAPSULE | Refills: 0 | Status: SHIPPED | OUTPATIENT
Start: 2022-02-13 | End: 2022-11-15

## 2022-05-20 ENCOUNTER — OFFICE VISIT (OUTPATIENT)
Dept: FAMILY MEDICINE CLINIC | Facility: CLINIC | Age: 82
End: 2022-05-20

## 2022-05-20 ENCOUNTER — APPOINTMENT (OUTPATIENT)
Dept: GENERAL RADIOLOGY | Facility: HOSPITAL | Age: 82
End: 2022-05-20

## 2022-05-20 ENCOUNTER — HOSPITAL ENCOUNTER (EMERGENCY)
Facility: HOSPITAL | Age: 82
Discharge: HOME | End: 2022-05-20
Attending: EMERGENCY MEDICINE | Admitting: EMERGENCY MEDICINE

## 2022-05-20 VITALS
RESPIRATION RATE: 16 BRPM | WEIGHT: 184.97 LBS | SYSTOLIC BLOOD PRESSURE: 125 MMHG | HEART RATE: 59 BPM | OXYGEN SATURATION: 95 % | DIASTOLIC BLOOD PRESSURE: 42 MMHG | BODY MASS INDEX: 34.04 KG/M2 | TEMPERATURE: 97.2 F | HEIGHT: 62 IN

## 2022-05-20 VITALS
OXYGEN SATURATION: 97 % | BODY MASS INDEX: 34.04 KG/M2 | SYSTOLIC BLOOD PRESSURE: 106 MMHG | DIASTOLIC BLOOD PRESSURE: 70 MMHG | HEIGHT: 62 IN | HEART RATE: 64 BPM | WEIGHT: 185 LBS
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
## 2022-05-20 DIAGNOSIS — L03.114 LEFT ARM CELLULITIS: ICD-10-CM

## 2022-05-20 DIAGNOSIS — S51.851A CAT BITE OF FOREARM, RIGHT, INITIAL ENCOUNTER: Primary | ICD-10-CM
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
## 2022-05-20 PROCEDURE — 73090 X-RAY EXAM OF FOREARM: CPT

## 2022-05-20 PROCEDURE — 90715 TDAP VACCINE 7 YRS/> IM: CPT | Performed by: EMERGENCY MEDICINE

## 2022-05-20 PROCEDURE — 90471 IMMUNIZATION ADMIN: CPT | Performed by: EMERGENCY MEDICINE

## 2022-05-20 PROCEDURE — 87070 CULTURE OTHR SPECIMN AEROBIC: CPT | Performed by: EMERGENCY MEDICINE

## 2022-05-20 PROCEDURE — 87205 SMEAR GRAM STAIN: CPT | Performed by: EMERGENCY MEDICINE

## 2022-05-20 PROCEDURE — 99213 OFFICE O/P EST LOW 20 MIN: CPT | Performed by: NURSE PRACTITIONER

## 2022-05-20 PROCEDURE — 99283 EMERGENCY DEPT VISIT LOW MDM: CPT

## 2022-05-20 PROCEDURE — 25010000002 TETANUS-DIPHTH-ACELL PERTUSSIS 5-2.5-18.5 LF-MCG/0.5 SUSPENSION PREFILLED SYRINGE: Performed by: EMERGENCY MEDICINE

## 2022-05-20 RX ORDER — METRONIDAZOLE 500 MG/1
500 TABLET ORAL ONCE
Status: COMPLETED | OUTPATIENT
Start: 2022-05-20 | End: 2022-05-20

## 2022-05-20 RX ORDER — METRONIDAZOLE 500 MG/1
500 TABLET ORAL 3 TIMES DAILY
Qty: 21 TABLET | Refills: 0 | Status: SHIPPED | OUTPATIENT
Start: 2022-05-20 | End: 2022-07-22

## 2022-05-20 RX ORDER — DOXYCYCLINE 100 MG/1
100 TABLET ORAL ONCE
Status: COMPLETED | OUTPATIENT
Start: 2022-05-20 | End: 2022-05-20

## 2022-05-20 RX ORDER — DOXYCYCLINE 100 MG/1
100 CAPSULE ORAL 2 TIMES DAILY
Qty: 14 CAPSULE | Refills: 0 | Status: SHIPPED | OUTPATIENT
Start: 2022-05-20 | End: 2022-07-22

## 2022-05-20 RX ADMIN — DOXYCYCLINE 100 MG: 100 TABLET ORAL at 19:40

## 2022-05-20 RX ADMIN — METRONIDAZOLE 500 MG: 500 TABLET ORAL at 19:40

## 2022-05-20 RX ADMIN — TETANUS TOXOID, REDUCED DIPHTHERIA TOXOID AND ACELLULAR PERTUSSIS VACCINE, ADSORBED 0.5 ML: 5; 2.5; 8; 8; 2.5 SUSPENSION INTRAMUSCULAR at 19:40

## 2022-05-20 NOTE — DISCHARGE INSTRUCTIONS
Warm compresses, elevation, warm water rinses.  Follow-up with your doctor on Monday for culture results and reexam.  Start Flagyl and doxycycline.  Return for increased pain, increased swelling, high fever, persistent vomiting or any other concerns

## 2022-05-20 NOTE — ED PROVIDER NOTES
Subjective   History of Present Illness  Cat bite right arm  82-year-old female states she was bitten by cat on her right forearm 2 days ago.  States her cats shots are up-to-date.  She states she has had some localized soft tissue swelling develop over the last couple of days but denies fevers or chills or vomiting.  She reports no numbness or weakness in extremities.  Review of Systems   Constitutional: Negative for fever.   HENT: Negative.    Respiratory: Negative.    Gastrointestinal: Negative.    Skin: Positive for wound.   Neurological: Negative.        Past Medical History:   Diagnosis Date   • Anxiety    • Depression    • DM2 (diabetes mellitus, type 2) ([facility])    • Gout    • Heart disease    • Hyperlipidemia    • Hypertension    • Kidney failure     Stage three kidney failure , abstraction from [facility]   • Macular degeneration    • Nondisplaced fracture of proximal end of left fibula 9/3/2021   • Sprain of left ankle, initial encounter 8/20/2021       Allergies   Allergen Reactions   • Ciprofloxacin Nausea And Vomiting   • Penicillin G Nausea And Vomiting   • Sulfa Antibiotics Nausea And Vomiting       Past Surgical History:   Procedure Laterality Date   • CHOLECYSTECTOMY  1988   • TOTAL ABDOMINAL HYSTERECTOMY  1970       No family history on file.    Social History     Socioeconomic History   • Marital status:    Tobacco Use   • Smoking status: Never Smoker   • Smokeless tobacco: Never Used   Vaping Use   • Vaping Use: Never used   Substance and Sexual Activity   • Alcohol use: No   • Drug use: No   • Sexual activity: Defer       Prior to Admission medications    Medication Sig Start Date End Date Taking? Authorizing Provider   atorvastatin (LIPITOR) 20 MG tablet Take 0.5 tablets by mouth every night at bedtime. 4/29/21   Jenn Tirado MD   B Complex Vitamins (VITAMIN B COMPLEX) tablet Take 1 tablet by mouth Daily. 11/2/17   Provider, MD Yahir   fluticasone (FLONASE) 50 MCG/ACT  "nasal spray 2 sprays into the nostril(s) as directed by provider Daily for 30 days. 1 spray in each nostril daily 8/17/21 9/16/21  Hiral Up PA-C   gabapentin (NEURONTIN) 300 MG capsule TAKE ONE CAPSULE BY MOUTH EVERY NIGHT AT BEDTIME 2/13/22   Jenn Tirado MD   glimepiride (AMARYL) 2 MG tablet TAKE ONE TABLET BY MOUTH TWICE A DAY WITH MEALS 11/2/21   Jenn Tirado MD   lisinopril (PRINIVIL,ZESTRIL) 10 MG tablet Take 1 tablet by mouth Daily. 10/20/21   Jenn Tirado MD   Lutein 20 MG tablet LUTEIN 20 MG TABS 11/7/13   Yahir Hsieh MD   metFORMIN (Glucophage) 500 MG tablet Take 1 tablet by mouth 2 (Two) Times a Day. 10/20/21   Jenn Tirado MD   Multiple Vitamins-Minerals (ICAPS) tablet ICAPS AREDS FORMULA TABS 11/7/13   Yahir Hsieh MD   ONE TOUCH LANCETS misc ONETOUCH LANCETS 6/6/12   Yahir Hsieh MD   OneTouch Ultra test strip TEST BLOOD SUGAR ONCE DAILY 9/13/21   Jenn Tirado MD   oxybutynin XL (Ditropan XL) 10 MG 24 hr tablet Take 1 tablet by mouth Daily. 4/28/21   Jenn Tirado MD   sertraline (ZOLOFT) 100 MG tablet TAKE ONE TABLET BY MOUTH DAILY 11/2/21   Jenn Tirado MD     /42 (BP Location: Left arm, Patient Position: Sitting)   Pulse 59   Temp 97.2 °F (36.2 °C) (Temporal)   Resp 16   Ht 157.5 cm (62\")   Wt 83.9 kg (184 lb 15.5 oz)   SpO2 95%   BMI 33.83 kg/m²   I examined the patient using the appropriate personal protective equipment.        Objective   Physical Exam  General: Well-appearing, no acute distress  Psych: Oriented, pleasant affect  Respirations: Clear, nonlabored respirations  Right forearm there is some focal erythema of the distal forearm on the radial lateral aspect, there is some superficial induration there is a few scabs from apparent puncture sites one of the scabs was the removed and had small amount of underlying superficial purulence, there " is no palpable fluctuance or deep tissue tenderness or crepitus.  She has normal pulses and normal sensorimotor function distally  Procedures           ED Course      XR Forearm 2 View Right    Result Date: 5/20/2022  No acute findings.  Electronically Signed By-Luis F Hannon MD On:5/20/2022 6:36 PM This report was finalized on [id number] by  Luis F Hannon MD.                                               MDM  Patient has findings of some cellulitis secondary to a cat bite.  There is no palpable fluctuance or abscess.  She has normal neurovascular function of the extremity and is nontoxic-appearing.  She is afebrile.  I reviewed the x-ray report and images, no foreign body seen.  Patient does have some antibiotic allergies and was started on doxycycline and Flagyl.  She will be discharged home with some wound care instructions and was given warning signs for return.  A culture was taken of one of the wounds and pending at the time of discharge.  Final diagnoses:   Cat bite, initial encounter   Left arm cellulitis       ED Disposition  ED Disposition     ED Disposition   Discharge    Condition   Stable    Comment   --             Jenn Tirado MD  [address] [place] [address] [phone]    Schedule an appointment as soon as possible for a visit in 3 days           Medication List      New Prescriptions    doxycycline 100 MG capsule  Commonly known as: MONODOX  Take 1 capsule by mouth 2 (Two) Times a Day.     metroNIDAZOLE 500 MG tablet  Commonly known as: FLAGYL  Take 1 tablet by mouth 3 (Three) Times a Day.           Where to Get Your Medications      These medications were sent to [facility] - [place], [place] - [address] [facility]  - [phone]  - [phone] [address] [place] [address]    Phone: [phone]   · doxycycline 100 MG capsule  · metroNIDAZOLE 500 MG tablet          Calvin Mooney MD  05/20/22 2468

## 2022-05-20 NOTE — PROGRESS NOTES
Subjective   Lyndajaleel Patterson is a 82 y.o. female.       HPI   Pt is here today with a family caregiver.    She is concerned about a cat bite on her right forearm.   The bite happened on Wednesday while she was making her bed; the cat is her own cat and up to date on immunizations.   He bit her right forearm in several places and scratched her right leg.   She had the area on her arm covered and family saw it today and urged her to be seen.   She has seen increasing redness and swelling; reports significant tenderness over the area.  No drainage.  Denies any fevers.  Family says she has been nauseated the past day.      The following portions of the patient's history were reviewed and updated as appropriate: allergies, current medications, past family history, past medical history, past social history, past surgical history and problem list.    Review of Systems   Constitutional: Negative for chills, fatigue and fever.   Respiratory: Negative for cough, shortness of breath and wheezing.    Cardiovascular: Negative for chest pain and palpitations.   Gastrointestinal: Positive for nausea. Negative for abdominal pain, constipation, diarrhea and vomiting.   Genitourinary: Negative for dysuria, frequency and urgency.   Musculoskeletal: Positive for arthralgias (right arm).   Skin: Positive for wound (cat bite right arm).   Neurological: Negative for dizziness, weakness, numbness and headache.       Objective   Physical Exam  Vitals reviewed.   Constitutional:       General: She is not in acute distress.     Appearance: Normal appearance.   Cardiovascular:      Rate and Rhythm: Normal rate and regular rhythm.      Pulses: Normal pulses.      Heart sounds: Normal heart sounds. No murmur heard.  Pulmonary:      Effort: Pulmonary effort is normal. No respiratory distress.      Breath sounds: Normal breath sounds. No wheezing.   Chest:      Chest wall: No tenderness.   Skin:         Neurological:      Mental Status: She  is alert and oriented to person, place, and time.   Psychiatric:         Mood and Affect: Mood normal.           Assessment & Plan   Diagnoses and all orders for this visit:    1. Cat bite of forearm, right, initial encounter (Primary)  Comments:  Pt is advised to go to ER now.   She verbalizes understanding and her caregiver will take her now.

## 2022-05-23 LAB
BACTERIA SPEC AEROBE CULT: ABNORMAL
GRAM STN SPEC: ABNORMAL
GRAM STN SPEC: ABNORMAL

## 2022-05-23 NOTE — PHARMACY RECOMMENDATION
Patient wound culture resulted with  Pasteruella multocida . Susceptible to Tetracyclines. Patient was given Rx for doxycycline. Therapy is appropriate coverage. No further follow-up required..    Microbiology Results (last 10 days)       Procedure Component Value - Date/Time    Wound Culture - Wound, Arm, Right [[id number]]  (Abnormal) Collected: 05/20/22 1757    Lab Status: Final result Specimen: Wound from Arm, Right Updated: 05/23/22 0902     Wound Culture Scant growth (1+) Pasteurella multocida     Comment: Pasteurella species are susceptible to penicillin, cephalosporins (except oral agents), tetracycline, quinolones, trimethoprim-sulfamethoxazole.          Gram Stain Many (4+) WBCs per low power field      No organisms seen            Wanda Alvarado, [facility]  5/23/2022 10:08 EDT

## 2022-05-24 ENCOUNTER — TELEPHONE (OUTPATIENT)
Dept: PEDIATRICS | Facility: OTHER | Age: 82
End: 2022-05-24

## 2022-05-24 NOTE — TELEPHONE ENCOUNTER
I spoke to Franci. She was instructed to call PCP for wound culture results. Says wound looks better but she is still getting some pus out of the wound. She wants to make sure antibiotic given is the correct antibiotic she needs to be on.

## 2022-05-24 NOTE — TELEPHONE ENCOUNTER
Caller: Leonardo Franci    Relationship: Emergency Contact    Best call back number: [phone]    Caller requesting test results: PATIENTS DAUGHTER IN LAW    What test was performed: CULTURE ON ARM WOUND    When was the test performed: 5-20    Where was the test performed: [facility] EMERGENCY DEPARTMENT    Additional notes: PATIENTS DAUGHTER IN LAW IS REQUESTING A CALLBACK TO DISCUSS THE RESULTS FROM THE CULTURE. PLEASE ADVISE.

## 2022-06-09 ENCOUNTER — TELEPHONE (OUTPATIENT)
Dept: FAMILY MEDICINE CLINIC | Facility: CLINIC | Age: 82
End: 2022-06-09

## 2022-06-09 NOTE — TELEPHONE ENCOUNTER
Caller: Fuentes Patterson    Relationship: Emergency Contact    Best call back number:   OK TO LEAVE A MESSAGE    What is the best time to reach you:     Who are you requesting to speak with (clinical staff, provider,  specific staff member)    Do you know the name of the person who called:     What was the call regarding: FUENTES IS CALLING IN TO ASK FOR A CALL BACK AS SHE HAS SOME QUESTIONS FOR THE DOCTOR.     Do you require a callback: YES

## 2022-06-23 ENCOUNTER — LAB (OUTPATIENT)
Dept: FAMILY MEDICINE CLINIC | Facility: CLINIC | Age: 82
End: 2022-06-23

## 2022-06-23 ENCOUNTER — OFFICE VISIT (OUTPATIENT)
Dept: FAMILY MEDICINE CLINIC | Facility: CLINIC | Age: 82
End: 2022-06-23

## 2022-06-23 VITALS
DIASTOLIC BLOOD PRESSURE: 79 MMHG | HEIGHT: 62 IN | RESPIRATION RATE: 16 BRPM | OXYGEN SATURATION: 98 % | SYSTOLIC BLOOD PRESSURE: 142 MMHG | WEIGHT: 183 LBS | TEMPERATURE: 97.6 F | BODY MASS INDEX: 33.68 KG/M2 | HEART RATE: 61 BPM

## 2022-06-23 DIAGNOSIS — R53.1 WEAKNESS GENERALIZED: ICD-10-CM

## 2022-06-23 DIAGNOSIS — I10 ESSENTIAL (PRIMARY) HYPERTENSION: ICD-10-CM

## 2022-06-23 DIAGNOSIS — E55.9 VITAMIN D DEFICIENCY: ICD-10-CM

## 2022-06-23 DIAGNOSIS — R29.6 FREQUENT FALLS: Primary | ICD-10-CM

## 2022-06-23 DIAGNOSIS — N18.30 TYPE 2 DIABETES MELLITUS WITH STAGE 3 CHRONIC KIDNEY DISEASE, WITHOUT LONG-TERM CURRENT USE OF INSULIN, UNSPECIFIED WHETHER STAGE 3A OR 3B CKD: ICD-10-CM

## 2022-06-23 DIAGNOSIS — R42 POSTURAL DIZZINESS WITH PRESYNCOPE: ICD-10-CM

## 2022-06-23 DIAGNOSIS — R55 POSTURAL DIZZINESS WITH PRESYNCOPE: ICD-10-CM

## 2022-06-23 DIAGNOSIS — E56.9 VITAMIN DEFICIENCY: ICD-10-CM

## 2022-06-23 DIAGNOSIS — R00.1 BRADYCARDIA BY ELECTROCARDIOGRAM: ICD-10-CM

## 2022-06-23 DIAGNOSIS — E78.2 MIXED HYPERLIPIDEMIA: ICD-10-CM

## 2022-06-23 DIAGNOSIS — E11.22 TYPE 2 DIABETES MELLITUS WITH STAGE 3 CHRONIC KIDNEY DISEASE, WITHOUT LONG-TERM CURRENT USE OF INSULIN, UNSPECIFIED WHETHER STAGE 3A OR 3B CKD: ICD-10-CM

## 2022-06-23 LAB
25(OH)D3 SERPL-MCNC: 25.6 NG/ML (ref 30–100)
ALBUMIN SERPL-MCNC: 4.3 G/DL (ref 3.5–5.2)
ALBUMIN UR-MCNC: 7.7 MG/DL
ALBUMIN/GLOB SERPL: 1.4 G/DL
ALP SERPL-CCNC: 80 U/L (ref 39–117)
ALT SERPL W P-5'-P-CCNC: 8 U/L (ref 1–33)
ANION GAP SERPL CALCULATED.3IONS-SCNC: 13.1 MMOL/L (ref 5–15)
AST SERPL-CCNC: 19 U/L (ref 1–32)
BASOPHILS # BLD AUTO: 0.04 10*3/MM3 (ref 0–0.2)
BASOPHILS NFR BLD AUTO: 0.4 % (ref 0–1.5)
BILIRUB SERPL-MCNC: 0.3 MG/DL (ref 0–1.2)
BUN SERPL-MCNC: 43 MG/DL (ref 8–23)
BUN/CREAT SERPL: 23.1 (ref 7–25)
CALCIUM SPEC-SCNC: 9.2 MG/DL (ref 8.6–10.5)
CHLORIDE SERPL-SCNC: 104 MMOL/L (ref 98–107)
CHOLEST SERPL-MCNC: 235 MG/DL (ref 0–200)
CO2 SERPL-SCNC: 22.9 MMOL/L (ref 22–29)
CREAT SERPL-MCNC: 1.86 MG/DL (ref 0.57–1)
CREAT UR-MCNC: 92.1 MG/DL
DEPRECATED RDW RBC AUTO: 40.3 FL (ref 37–54)
EGFRCR SERPLBLD CKD-EPI 2021: 26.4 ML/MIN/1.73
EOSINOPHIL # BLD AUTO: 0.43 10*3/MM3 (ref 0–0.4)
EOSINOPHIL NFR BLD AUTO: 4.6 % (ref 0.3–6.2)
ERYTHROCYTE [DISTWIDTH] IN BLOOD BY AUTOMATED COUNT: 12.2 % (ref 12.3–15.4)
FOLATE SERPL-MCNC: 3.94 NG/ML (ref 4.78–24.2)
GLOBULIN UR ELPH-MCNC: 3.1 GM/DL
GLUCOSE SERPL-MCNC: 119 MG/DL (ref 65–99)
HBA1C MFR BLD: 5.8 % (ref 3.5–5.6)
HCT VFR BLD AUTO: 36.2 % (ref 34–46.6)
HDLC SERPL-MCNC: 60 MG/DL (ref 40–60)
HGB BLD-MCNC: 11.9 G/DL (ref 12–15.9)
IMM GRANULOCYTES # BLD AUTO: 0.04 10*3/MM3 (ref 0–0.05)
IMM GRANULOCYTES NFR BLD AUTO: 0.4 % (ref 0–0.5)
LDLC SERPL CALC-MCNC: 154 MG/DL (ref 0–100)
LDLC/HDLC SERPL: 2.51 {RATIO}
LYMPHOCYTES # BLD AUTO: 1.89 10*3/MM3 (ref 0.7–3.1)
LYMPHOCYTES NFR BLD AUTO: 20.1 % (ref 19.6–45.3)
MCH RBC QN AUTO: 30.1 PG (ref 26.6–33)
MCHC RBC AUTO-ENTMCNC: 32.9 G/DL (ref 31.5–35.7)
MCV RBC AUTO: 91.6 FL (ref 79–97)
MICROALBUMIN/CREAT UR: 83.6 MG/G
MONOCYTES # BLD AUTO: 0.91 10*3/MM3 (ref 0.1–0.9)
MONOCYTES NFR BLD AUTO: 9.7 % (ref 5–12)
NEUTROPHILS NFR BLD AUTO: 6.11 10*3/MM3 (ref 1.7–7)
NEUTROPHILS NFR BLD AUTO: 64.8 % (ref 42.7–76)
NRBC BLD AUTO-RTO: 0 /100 WBC (ref 0–0.2)
PLATELET # BLD AUTO: 267 10*3/MM3 (ref 140–450)
PMV BLD AUTO: 10.5 FL (ref 6–12)
POTASSIUM SERPL-SCNC: 4.5 MMOL/L (ref 3.5–5.2)
PROT SERPL-MCNC: 7.4 G/DL (ref 6–8.5)
RBC # BLD AUTO: 3.95 10*6/MM3 (ref 3.77–5.28)
SODIUM SERPL-SCNC: 140 MMOL/L (ref 136–145)
TRIGL SERPL-MCNC: 121 MG/DL (ref 0–150)
TSH SERPL DL<=0.05 MIU/L-ACNC: 3.56 UIU/ML (ref 0.27–4.2)
VIT B12 BLD-MCNC: 456 PG/ML (ref 211–946)
VLDLC SERPL-MCNC: 21 MG/DL (ref 5–40)
WBC NRBC COR # BLD: 9.42 10*3/MM3 (ref 3.4–10.8)

## 2022-06-23 PROCEDURE — 82306 VITAMIN D 25 HYDROXY: CPT | Performed by: PHYSICIAN ASSISTANT

## 2022-06-23 PROCEDURE — 36415 COLL VENOUS BLD VENIPUNCTURE: CPT | Performed by: PHYSICIAN ASSISTANT

## 2022-06-23 PROCEDURE — 82570 ASSAY OF URINE CREATININE: CPT | Performed by: PHYSICIAN ASSISTANT

## 2022-06-23 PROCEDURE — 99214 OFFICE O/P EST MOD 30 MIN: CPT | Performed by: PHYSICIAN ASSISTANT

## 2022-06-23 PROCEDURE — 85025 COMPLETE CBC W/AUTO DIFF WBC: CPT | Performed by: PHYSICIAN ASSISTANT

## 2022-06-23 PROCEDURE — 82043 UR ALBUMIN QUANTITATIVE: CPT | Performed by: PHYSICIAN ASSISTANT

## 2022-06-23 PROCEDURE — 82746 ASSAY OF FOLIC ACID SERUM: CPT | Performed by: PHYSICIAN ASSISTANT

## 2022-06-23 PROCEDURE — 83036 HEMOGLOBIN GLYCOSYLATED A1C: CPT | Performed by: PHYSICIAN ASSISTANT

## 2022-06-23 PROCEDURE — 93000 ELECTROCARDIOGRAM COMPLETE: CPT | Performed by: PHYSICIAN ASSISTANT

## 2022-06-23 PROCEDURE — 80061 LIPID PANEL: CPT | Performed by: PHYSICIAN ASSISTANT

## 2022-06-23 PROCEDURE — 82607 VITAMIN B-12: CPT | Performed by: PHYSICIAN ASSISTANT

## 2022-06-23 PROCEDURE — 84443 ASSAY THYROID STIM HORMONE: CPT | Performed by: PHYSICIAN ASSISTANT

## 2022-06-23 PROCEDURE — 80053 COMPREHEN METABOLIC PANEL: CPT | Performed by: PHYSICIAN ASSISTANT

## 2022-06-23 NOTE — PROGRESS NOTES
Subjective   Lyndajaleel Patterson is a 82 y.o. female.     Pt presents with weakness that has progressively worsened over the past 6 months. She gets dizzy and feels like she is going to pass out.  She hasn't seen her kidney doctor since before COVID19.  She was accidentally been taking 2 of the lisinopril and 2 of her oxybutynin for an unknown period of time. She did stop taking them completely this past Friday.  She has also not been taking the glimepiride for a couple of months.  She has a little less dry mouth since stopping the oxybutynin.  She is still feeling her other symptoms with last episode happening yesterday.  She has had altered taste ever since taking flagyl last month.       The following portions of the patient's history were reviewed and updated as appropriate: allergies, current medications, past family history, past medical history, past social history, past surgical history and problem list.  Past Medical History:   Diagnosis Date   • Anxiety    • Depression    • DM2 (diabetes mellitus, type 2) ([facility])    • Gout    • Heart disease    • Hyperlipidemia    • Hypertension    • Kidney failure     Stage three kidney failure , abstraction from [facility]   • Macular degeneration    • Nondisplaced fracture of proximal end of left fibula 9/3/2021   • Sprain of left ankle, initial encounter 8/20/2021     Past Surgical History:   Procedure Laterality Date   • CHOLECYSTECTOMY  1988   • TOTAL ABDOMINAL HYSTERECTOMY  1970     No family history on file.  Social History     Socioeconomic History   • Marital status:    Tobacco Use   • Smoking status: Never Smoker   • Smokeless tobacco: Never Used   Vaping Use   • Vaping Use: Never used   Substance and Sexual Activity   • Alcohol use: No   • Drug use: No   • Sexual activity: Defer         Current Outpatient Medications:   •  atorvastatin (LIPITOR) 20 MG tablet, Take 0.5 tablets by mouth every night at bedtime., Disp: 90 tablet, Rfl: 3  •  gabapentin  (NEURONTIN) 300 MG capsule, TAKE ONE CAPSULE BY MOUTH EVERY NIGHT AT BEDTIME, Disp: 270 capsule, Rfl: 0  •  Lutein 20 MG tablet, LUTEIN 20 MG TABS, Disp: , Rfl:   •  metFORMIN (Glucophage) 500 MG tablet, Take 1 tablet by mouth 2 (Two) Times a Day., Disp: 180 tablet, Rfl: 3  •  sertraline (ZOLOFT) 100 MG tablet, TAKE ONE TABLET BY MOUTH DAILY, Disp: 90 tablet, Rfl: 1  •  B Complex Vitamins (VITAMIN B COMPLEX) tablet, Take 1 tablet by mouth Daily., Disp: , Rfl:   •  doxycycline (MONODOX) 100 MG capsule, Take 1 capsule by mouth 2 (Two) Times a Day., Disp: 14 capsule, Rfl: 0  •  fluticasone (FLONASE) 50 MCG/ACT nasal spray, 2 sprays into the nostril(s) as directed by provider Daily for 30 days. 1 spray in each nostril daily, Disp: 16 g, Rfl: 0  •  glimepiride (AMARYL) 2 MG tablet, TAKE ONE TABLET BY MOUTH TWICE A DAY WITH MEALS, Disp: 180 tablet, Rfl: 1  •  lisinopril (PRINIVIL,ZESTRIL) 10 MG tablet, Take 1 tablet by mouth Daily., Disp: 90 tablet, Rfl: 3  •  metroNIDAZOLE (FLAGYL) 500 MG tablet, Take 1 tablet by mouth 3 (Three) Times a Day., Disp: 21 tablet, Rfl: 0  •  Multiple Vitamins-Minerals (ICAPS) tablet, ICAPS AREDS FORMULA TABS, Disp: , Rfl:   •  ONE TOUCH LANCETS misc, ONETOUCH LANCETS, Disp: , Rfl:   •  OneTouch Ultra test strip, TEST BLOOD SUGAR ONCE DAILY, Disp: 100 each, Rfl: 1  •  oxybutynin XL (Ditropan XL) 10 MG 24 hr tablet, Take 1 tablet by mouth Daily., Disp: 90 tablet, Rfl: 3    Review of Systems   Constitutional: Positive for fatigue. Negative for activity change, appetite change, chills, diaphoresis, fever, unexpected weight gain and unexpected weight loss.   HENT: Negative for trouble swallowing.    Eyes: Negative for blurred vision and visual disturbance.   Respiratory: Negative for chest tightness, shortness of breath and wheezing.    Cardiovascular: Negative for chest pain, palpitations and leg swelling.   Gastrointestinal: Negative for abdominal pain, nausea and vomiting.   Musculoskeletal:  "Positive for gait problem.   Neurological: Positive for dizziness, syncope, weakness and light-headedness. Negative for tremors, seizures, facial asymmetry, speech difficulty, numbness, headache, memory problem and confusion.   Psychiatric/Behavioral: Negative for sleep disturbance and stress. The patient is not nervous/anxious.      /79 (BP Location: Left arm, Patient Position: Sitting, Cuff Size: Large Adult)   Pulse 61   Temp 97.6 °F (36.4 °C) (Temporal)   Resp 16   Ht 157.5 cm (62\")   Wt 83 kg (183 lb)   SpO2 98%   BMI 33.47 kg/m²       Objective   Physical Exam  Vitals and nursing note reviewed.   Constitutional:       Appearance: Normal appearance.   HENT:      Head: Normocephalic and atraumatic.      Right Ear: Tympanic membrane, ear canal and external ear normal.      Left Ear: Tympanic membrane, ear canal and external ear normal.      Nose: Nose normal.   Eyes:      Extraocular Movements: Extraocular movements intact.      Conjunctiva/sclera: Conjunctivae normal.      Pupils: Pupils are equal, round, and reactive to light.   Neck:      Thyroid: No thyroid mass, thyromegaly or thyroid tenderness.      Vascular: No carotid bruit.   Cardiovascular:      Rate and Rhythm: Regular rhythm. Bradycardia present.      Heart sounds: Normal heart sounds.   Pulmonary:      Effort: Pulmonary effort is normal.      Breath sounds: Normal breath sounds.   Abdominal:      General: Abdomen is flat. Bowel sounds are normal.      Palpations: Abdomen is soft.   Musculoskeletal:         General: Normal range of motion.      Cervical back: Normal range of motion and neck supple.      Right lower leg: No edema.      Left lower leg: No edema.   Skin:     General: Skin is warm and dry.   Neurological:      General: No focal deficit present.      Mental Status: She is alert and oriented to person, place, and time.      Cranial Nerves: No cranial nerve deficit.      Sensory: No sensory deficit.   Psychiatric:         Mood " and Affect: Mood normal.         Behavior: Behavior normal.         Thought Content: Thought content normal.       Vitals:    06/23/22 0800 06/23/22 0839 06/23/22 0840 06/23/22 0841   Orthostatic BP:  119/72 125/74 109/71   Orthostatic Pulse:  68 58 63   Patient Position: Sitting Lying Sitting Standing       ECG 12 Lead    Date/Time: 6/23/2022 10:26 PM  Performed by: Malinda Johnston PA  Authorized by: Malinda Johnston PA   Rhythm: sinus bradycardia  Rate: bradycardic  Conduction: conduction normal  ST Segments: ST segments normal  QRS axis: normal  Other findings: non-specific ST-T wave changes and low voltage    Clinical impression: abnormal EKG               Diagnoses and all orders for this visit:    1. Frequent falls (Primary)  Comments:  Episodes of presyncope/syncope.  Pt to see cardiology since slightly bradycardic today but no medications that should be causing it.  Orders:  -     Comprehensive Metabolic Panel  -     Cancel: POCT urinalysis dipstick, automated    2. Type 2 diabetes mellitus with stage 3 chronic kidney disease, without long-term current use of insulin, unspecified whether stage 3a or 3b CKD (HCC)  Comments:  Pt to get labs done.  She hasn't followed up with her nephrologist since COVID19 pandemic. She also hasn't been taking her glimepiride due to symptoms.  Orders:  -     Comprehensive Metabolic Panel  -     Hemoglobin A1c  -     Microalbumin / Creatinine Urine Ratio - Urine, Clean Catch    3. Mixed hyperlipidemia  Comments:  Will recheck labs.  Orders:  -     Lipid Panel    4. Essential (primary) hypertension  Comments:  Pt hasn't been taking her lisinopril lately due to recent symptoms.    5. Postural dizziness with presyncope  Comments:  Pt to see cardiology for further evaluation.  Will also check labs.    Orders:  -     Comprehensive Metabolic Panel  -     TSH  -     CBC & Differential  -     Vitamin B12  -     Folate  -     ECG 12 Lead    6. Vitamin deficiency  Comments:  Will check  labs.  Orders:  -     Vitamin D 25 Hydroxy  -     Vitamin B12  -     Folate    7. Weakness generalized  Comments:  Will check labs.  Orders:  -     TSH  -     Vitamin D 25 Hydroxy    8. Vitamin D deficiency  Comments:  Pt to get labs done.  Orders:  -     Vitamin D 25 Hydroxy    9. Bradycardia by electrocardiogram  Comments:  Pt to see cardiology for possible holter/event monitor.  Orders:  -     Cancel: Ambulatory Referral to Cardiology  -     Ambulatory Referral to Cardiology    I spent 30 minutes of patient care including reviewing pt's previous hx, reviewing current symptoms, performing exam, ordering tests, discussing treatment plan and completing my note.      [phone]-Mya-call with lab results.

## 2022-07-09 RX ORDER — ATORVASTATIN CALCIUM 20 MG/1
TABLET, FILM COATED ORAL
Qty: 45 TABLET | Refills: 1 | Status: SHIPPED | OUTPATIENT
Start: 2022-07-09 | End: 2022-07-22

## 2022-07-22 ENCOUNTER — OFFICE VISIT (OUTPATIENT)
Dept: CARDIOLOGY | Facility: CLINIC | Age: 82
End: 2022-07-22

## 2022-07-22 VITALS
DIASTOLIC BLOOD PRESSURE: 78 MMHG | HEART RATE: 69 BPM | OXYGEN SATURATION: 98 % | HEIGHT: 62 IN | WEIGHT: 180 LBS | SYSTOLIC BLOOD PRESSURE: 135 MMHG | BODY MASS INDEX: 33.13 KG/M2

## 2022-07-22 DIAGNOSIS — E78.5 DYSLIPIDEMIA: ICD-10-CM

## 2022-07-22 DIAGNOSIS — E11.9 TYPE 2 DIABETES MELLITUS WITHOUT COMPLICATION, WITHOUT LONG-TERM CURRENT USE OF INSULIN: ICD-10-CM

## 2022-07-22 DIAGNOSIS — R06.09 DYSPNEA ON EXERTION: ICD-10-CM

## 2022-07-22 DIAGNOSIS — E66.9 OBESITY (BMI 30-39.9): ICD-10-CM

## 2022-07-22 DIAGNOSIS — R55 RECURRENT SYNCOPE: Primary | ICD-10-CM

## 2022-07-22 DIAGNOSIS — I10 ESSENTIAL HYPERTENSION: ICD-10-CM

## 2022-07-22 DIAGNOSIS — N18.30 STAGE 3 CHRONIC KIDNEY DISEASE, UNSPECIFIED WHETHER STAGE 3A OR 3B CKD: ICD-10-CM

## 2022-07-22 DIAGNOSIS — R00.1 BRADYCARDIA: ICD-10-CM

## 2022-07-22 PROCEDURE — 99204 OFFICE O/P NEW MOD 45 MIN: CPT | Performed by: INTERNAL MEDICINE

## 2022-07-22 RX ORDER — SIMVASTATIN 10 MG
10 TABLET ORAL NIGHTLY
COMMUNITY
End: 2022-12-15 | Stop reason: ALTCHOICE

## 2022-07-22 NOTE — PROGRESS NOTES
Cardiology Consult Note    Patient Identification:  Name: Lynda Patterson  Age: 82 y.o.  Sex: female  :  1940  MRN: [id number]             Requesting Physician :  Malinda Johnston PA      Reason for Consultation / Chief Complaint : Recurrent syncope, bradycardia    History of Present Illness:      Ms. Lynda Patterson has PMH of    CAD, details of which are not available   Hypertension  Dyslipidemia  Type 2 diabetes  CKD  Cholecystectomy, hysterectomy  Non-smoker  Allergies/intolerance to Cipro, penicillin, sulfa  Positive family history of premature CAD in patient's son        Here for evaluation of recurrent syncope.  Patient has been noticing in the last 6 months has been having recurrent syncope with no aggravating or relieving factors.  Was found to be orthostatic lisinopril was stopped and continues to have recurrent syncope.  Had slow heart rates.  Denies any chest pain.  Has dyspnea on exertion.  Walks with a cane.    Patient's arterial blood pressure is 1 3578, heart rate 69, O2 sat of 98% on room air.  BMI is over 30.    Review of records reveal labs from 2022 reveal a BUN/creatinine of 43/8.6 and a glucose of 119.  Hemoglobin A1c of 5.8 improved from 6.4 on 2021.  Folate was low.  B12 was normal, TSH was normal.  Lipid profile with cholesterol 235, triglycerides 121, HDL 60, .            Assessment:  :    Recurrent syncope  Bradycardia  Dyspnea on exertion  CAD  Hypertension  Dyslipidemia  Diabetes  CKD  Obesity with BMI over 30  Positive family history of premature CAD    Recommendations / Plan:        Will check echo Holter and stress Cardiolite to evaluate bradycardia dyspnea on exertion and CAD.  We will follow-up after testing and consider further evaluation treatment.  Reviewed EKG results with patient.  Patient is bradycardic is not on any drugs which can cause bradycardia and TSH is normal.  Mass probably might end up requiring pacemaker.  We will follow-up  and consider the same.           Diagnosis Plan   1. Recurrent syncope  Adult Transthoracic Echo Complete W/ Cont if Necessary Per Protocol    Holter Monitor - 72 Hour Up To 15 Days    Stress Test With Myocardial Perfusion One Day   2. Bradycardia  Adult Transthoracic Echo Complete W/ Cont if Necessary Per Protocol    Holter Monitor - 72 Hour Up To 15 Days    Stress Test With Myocardial Perfusion One Day   3. Dyspnea on exertion  Adult Transthoracic Echo Complete W/ Cont if Necessary Per Protocol    Holter Monitor - 72 Hour Up To 15 Days    Stress Test With Myocardial Perfusion One Day   4. Essential hypertension  Adult Transthoracic Echo Complete W/ Cont if Necessary Per Protocol    Holter Monitor - 72 Hour Up To 15 Days    Stress Test With Myocardial Perfusion One Day   5. Type 2 diabetes mellitus without complication, without long-term current use of insulin ([facility])  Adult Transthoracic Echo Complete W/ Cont if Necessary Per Protocol    Holter Monitor - 72 Hour Up To 15 Days    Stress Test With Myocardial Perfusion One Day   6. Dyslipidemia  Adult Transthoracic Echo Complete W/ Cont if Necessary Per Protocol    Holter Monitor - 72 Hour Up To 15 Days    Stress Test With Myocardial Perfusion One Day   7. Stage 3 chronic kidney disease, unspecified whether stage 3a or 3b CKD ([facility])  Adult Transthoracic Echo Complete W/ Cont if Necessary Per Protocol    Holter Monitor - 72 Hour Up To 15 Days    Stress Test With Myocardial Perfusion One Day   8. Obesity (BMI 30-39.9)  Adult Transthoracic Echo Complete W/ Cont if Necessary Per Protocol    Holter Monitor - 72 Hour Up To 15 Days    Stress Test With Myocardial Perfusion One Day              Past Medical History:  Past Medical History:   Diagnosis Date   • Anxiety    • Depression    • DM2 (diabetes mellitus, type 2) ([facility])    • Gout    • Heart disease    • Hyperlipidemia    • Hypertension    • Kidney failure     Stage three kidney failure , abstraction from [facility]   •  Macular degeneration    • Nondisplaced fracture of proximal end of left fibula 9/3/2021   • Sprain of left ankle, initial encounter 8/20/2021     Past Surgical History:  Past Surgical History:   Procedure Laterality Date   • CHOLECYSTECTOMY  1988   • TOTAL ABDOMINAL HYSTERECTOMY  1970      Allergies:  Allergies   Allergen Reactions   • Ciprofloxacin Nausea And Vomiting   • Penicillin G Nausea And Vomiting   • Sulfa Antibiotics Nausea And Vomiting     Home Meds:  (Not in a hospital admission)    Current Meds:     Current Outpatient Medications:   •  gabapentin (NEURONTIN) 300 MG capsule, TAKE ONE CAPSULE BY MOUTH EVERY NIGHT AT BEDTIME, Disp: 270 capsule, Rfl: 0  •  metFORMIN (Glucophage) 500 MG tablet, Take 1 tablet by mouth 2 (Two) Times a Day., Disp: 180 tablet, Rfl: 3  •  Multiple Vitamins-Minerals (ICAPS) tablet, ICAPS AREDS FORMULA TABS, Disp: , Rfl:   •  ONE TOUCH LANCETS misc, ONETOUCH LANCETS, Disp: , Rfl:   •  OneTouch Ultra test strip, TEST BLOOD SUGAR ONCE DAILY, Disp: 100 each, Rfl: 1  •  sertraline (ZOLOFT) 100 MG tablet, TAKE ONE TABLET BY MOUTH DAILY (Patient taking differently: 50 mg.), Disp: 90 tablet, Rfl: 1  •  simvastatin (ZOCOR) 10 MG tablet, Take 10 mg by mouth Every Night., Disp: , Rfl:   •  vitamin D3 125 MCG (5000 UT) capsule capsule, Take 5,000 Units by mouth Daily., Disp: , Rfl:   •  fluticasone (FLONASE) 50 MCG/ACT nasal spray, 2 sprays into the nostril(s) as directed by provider Daily for 30 days. 1 spray in each nostril daily, Disp: 16 g, Rfl: 0  Social History:   Social History     Tobacco Use   • Smoking status: Never Smoker   • Smokeless tobacco: Never Used   Substance Use Topics   • Alcohol use: No      Family History:  Family History   Problem Relation Age of Onset   • Stroke Mother    • Stroke Father    • Heart disease Brother    • Heart attack Son    • Heart disease Son         Review of Systems : Review of Systems   Constitutional: Positive for malaise/fatigue. Negative for  "fever.   Cardiovascular: Positive for syncope. Negative for chest pain, dyspnea on exertion and palpitations.   Respiratory: Positive for shortness of breath. Negative for cough.    Skin: Negative for rash.   Gastrointestinal: Negative for abdominal pain, nausea and vomiting.   Neurological: Positive for dizziness and light-headedness. Negative for focal weakness and headaches.   All other systems reviewed and are negative.               Constitutional:  Heart Rate:  [69] 69  BP: (135)/(78) 135/78    Physical Exam   /78 (BP Location: Left arm, Patient Position: Sitting)   Pulse 69   Ht 157.5 cm (62\")   Wt 81.6 kg (180 lb)   SpO2 98%   BMI 32.92 kg/m²   Physical Exam  General:  Appears in no acute distress  Eyes: Sclera is anicteric,  conjunctiva is clear   HEENT:  No JVD. Thyroid not visibly enlarged. No mucosal pallor or cyanosis  Respiratory: Respirations regular and unlabored at rest.  Bilaterally good breath sounds, with good air entry in all fields. No crackles, rubs or wheezes auscultated  Cardiovascular: S1,S2 Regular rate and rhythm. No murmur, rub or gallop auscultated. No pretibial pitting edema  Gastrointestinal: Abdomen soft, flat, non tender. Bowel sounds present.   Musculoskeletal:  No abnormal movements  Extremities: No digital clubbing or cyanosis  Skin: Color pink. Skin warm and dry to touch. No rashes  No xanthoma  Neuro: Alert and awake, no lateralizing deficits appreciated    Cardiographics  ECG: EKG tracing was  personally reviewed/interpreted by me from 6/23/2022 reveals sinus bradycardia rate of 54 bpm          Imaging  Chest X-ray:   Imaging Results (Last 24 Hours)     ** No results found for the last 24 hours. **          Lab Review: I have reviewed the labs                                      Deric Johnson MD  7/22/2022, 12:38 EDT      EMR Dragon/Transcription:   Dictated utilizing Dragon dictation  "

## 2022-07-25 RX ORDER — OXYBUTYNIN CHLORIDE 10 MG/1
TABLET, EXTENDED RELEASE ORAL
Qty: 90 TABLET | Refills: 3 | OUTPATIENT
Start: 2022-07-25

## 2022-08-12 ENCOUNTER — HOSPITAL ENCOUNTER (OUTPATIENT)
Dept: CARDIOLOGY | Facility: HOSPITAL | Age: 82
Discharge: HOME | End: 2022-08-12

## 2022-08-12 ENCOUNTER — OFFICE VISIT (OUTPATIENT)
Dept: CARDIOLOGY | Facility: CLINIC | Age: 82
End: 2022-08-12

## 2022-08-12 VITALS
HEART RATE: 53 BPM | WEIGHT: 182 LBS | SYSTOLIC BLOOD PRESSURE: 134 MMHG | DIASTOLIC BLOOD PRESSURE: 84 MMHG | HEIGHT: 62 IN | BODY MASS INDEX: 33.49 KG/M2

## 2022-08-12 VITALS
DIASTOLIC BLOOD PRESSURE: 61 MMHG | WEIGHT: 180 LBS | HEART RATE: 58 BPM | HEIGHT: 62 IN | SYSTOLIC BLOOD PRESSURE: 153 MMHG | BODY MASS INDEX: 33.13 KG/M2

## 2022-08-12 DIAGNOSIS — I10 ESSENTIAL HYPERTENSION: ICD-10-CM

## 2022-08-12 DIAGNOSIS — E66.9 OBESITY (BMI 30-39.9): ICD-10-CM

## 2022-08-12 DIAGNOSIS — R55 RECURRENT SYNCOPE: ICD-10-CM

## 2022-08-12 DIAGNOSIS — E78.5 DYSLIPIDEMIA: ICD-10-CM

## 2022-08-12 DIAGNOSIS — R06.09 DYSPNEA ON EXERTION: ICD-10-CM

## 2022-08-12 DIAGNOSIS — R00.1 BRADYCARDIA: ICD-10-CM

## 2022-08-12 DIAGNOSIS — E11.9 TYPE 2 DIABETES MELLITUS WITHOUT COMPLICATION, WITHOUT LONG-TERM CURRENT USE OF INSULIN: ICD-10-CM

## 2022-08-12 DIAGNOSIS — N18.30 STAGE 3 CHRONIC KIDNEY DISEASE, UNSPECIFIED WHETHER STAGE 3A OR 3B CKD: ICD-10-CM

## 2022-08-12 DIAGNOSIS — R42 DIZZINESS: Primary | ICD-10-CM

## 2022-08-12 PROCEDURE — 93018 CV STRESS TEST I&R ONLY: CPT | Performed by: INTERNAL MEDICINE

## 2022-08-12 PROCEDURE — A9500 TC99M SESTAMIBI: HCPCS | Performed by: INTERNAL MEDICINE

## 2022-08-12 PROCEDURE — 99214 OFFICE O/P EST MOD 30 MIN: CPT | Performed by: INTERNAL MEDICINE

## 2022-08-12 PROCEDURE — 93017 CV STRESS TEST TRACING ONLY: CPT

## 2022-08-12 PROCEDURE — 25010000002 REGADENOSON 0.4 MG/5ML SOLUTION: Performed by: INTERNAL MEDICINE

## 2022-08-12 PROCEDURE — 0 TECHNETIUM SESTAMIBI: Performed by: INTERNAL MEDICINE

## 2022-08-12 PROCEDURE — 78452 HT MUSCLE IMAGE SPECT MULT: CPT

## 2022-08-12 PROCEDURE — 93016 CV STRESS TEST SUPVJ ONLY: CPT | Performed by: NURSE PRACTITIONER

## 2022-08-12 PROCEDURE — 78452 HT MUSCLE IMAGE SPECT MULT: CPT | Performed by: INTERNAL MEDICINE

## 2022-08-12 PROCEDURE — 93306 TTE W/DOPPLER COMPLETE: CPT | Performed by: INTERNAL MEDICINE

## 2022-08-12 PROCEDURE — 93306 TTE W/DOPPLER COMPLETE: CPT

## 2022-08-12 RX ADMIN — TECHNETIUM TC 99M SESTAMIBI 1 DOSE: 1 INJECTION INTRAVENOUS at 08:33

## 2022-08-12 RX ADMIN — REGADENOSON 0.4 MG: 0.08 INJECTION, SOLUTION INTRAVENOUS at 10:25

## 2022-08-12 RX ADMIN — TECHNETIUM TC 99M SESTAMIBI 1 DOSE: 1 INJECTION INTRAVENOUS at 10:25

## 2022-08-12 NOTE — PROGRESS NOTES
Subjective:     Encounter Date:08/12/2022      Patient ID: Lynda Patterson is a 82 y.o. female.    Chief Complaint : Stress test follow-up for recurrent syncope and bradycardia    History of Present Illness      Ms. Lynda Patterson has PMH of    CAD, details of which are not available   Hypertension  Dyslipidemia  Type 2 diabetes  CKD  Cholecystectomy, hysterectomy  Non-smoker  Allergies/intolerance to Cipro, penicillin, sulfa  Positive family history of premature CAD in patient's son        Here for stress test follow-up.  Patient was seen for evaluation of recurrent syncope.  Patient has been noticing in the last 6 months has been having recurrent syncope with no aggravating or relieving factors.  Was found to be orthostatic lisinopril was stopped and continues to have recurrent syncope.  Had slow heart rates.  Denies any chest pain.  Has dyspnea on exertion.  Walks with a cane.    Patient's arterial blood pressure is 134/84, heart rate 53 bpm.  BMI is over 30.    Review of records reveal labs from 6/23/2022 reveal a BUN/creatinine of 43/8.6 and a glucose of 119.  Hemoglobin A1c of 5.8 improved from 6.4 on 4/28/2021.  Folate was low.  B12 was normal, TSH was normal.  Lipid profile with cholesterol 235, triglycerides 121, HDL 60, .            Assessment:  :    Recurrent syncope  Bradycardia  Dyspnea on exertion  CAD  Hypertension  Dyslipidemia  Diabetes  CKD  Obesity with BMI over 30  Positive family history of premature CAD    Recommendations / Plan:      Patient underwent Lexiscan Cardiolite reviewed/interpreted by me 8/12/2022 which revealed normal perfusion EF of 77%.  We will check a Holter monitor before next visit, patient is behaving like tachybradycardia syndrome...  We will follow-up after testing and consider further evaluation treatment.  Reviewed EKG results with patient.  Patient is bradycardic is not on any drugs which can cause bradycardia and TSH is normal.  Most probably  might end up requiring pacemaker.  We will follow-up and consider the same.    Procedures Lexiscan Cardiolite performed 8/12/2022 reviewed/interpreted by me reveals normal perfusion EF of 77%    The following portions of the patient's history were reviewed and updated as appropriate: allergies, current medications, past family history, past medical history, past social history, past surgical history and problem list.    Assessment:         [facility]     Diagnosis Plan   1. Dizziness  Holter Monitor - 72 Hour Up To 15 Days   2. Recurrent syncope  Holter Monitor - 72 Hour Up To 15 Days   3. Bradycardia  Holter Monitor - 72 Hour Up To 15 Days   4. Essential hypertension  Holter Monitor - 72 Hour Up To 15 Days   5. Dyslipidemia  Holter Monitor - 72 Hour Up To 15 Days   6. Type 2 diabetes mellitus without complication, without long-term current use of insulin (H[facility])  Holter Monitor - 72 Hour Up To 15 Days   7. Obesity (BMI 30-39.9)  Holter Monitor - 72 Hour Up To 15 Days          Plan:               Past Medical History:  Past Medical History:   Diagnosis Date   • Anxiety    • Depression    • DM2 (diabetes mellitus, type 2) ([facility])    • Gout    • Heart disease    • Hyperlipidemia    • Hypertension    • Kidney failure     Stage three kidney failure , abstraction from [facility]   • Macular degeneration    • Nondisplaced fracture of proximal end of left fibula 9/3/2021   • Sprain of left ankle, initial encounter 8/20/2021     Past Surgical History:  Past Surgical History:   Procedure Laterality Date   • CHOLECYSTECTOMY  1988   • TOTAL ABDOMINAL HYSTERECTOMY  1970      Allergies:  Allergies   Allergen Reactions   • Ciprofloxacin Nausea And Vomiting   • Penicillin G Nausea And Vomiting   • Sulfa Antibiotics Nausea And Vomiting     Home Meds:  Current Meds:     Current Outpatient Medications:   •  gabapentin (NEURONTIN) 300 MG capsule, TAKE ONE CAPSULE BY MOUTH EVERY NIGHT AT BEDTIME, Disp: 270 capsule, Rfl: 0  •  metFORMIN  "(Glucophage) 500 MG tablet, Take 1 tablet by mouth 2 (Two) Times a Day., Disp: 180 tablet, Rfl: 3  •  Multiple Vitamins-Minerals (ICAPS) tablet, ICAPS AREDS FORMULA TABS, Disp: , Rfl:   •  ONE TOUCH LANCETS misc, ONETOUCH LANCETS, Disp: , Rfl:   •  OneTouch Ultra test strip, TEST BLOOD SUGAR ONCE DAILY, Disp: 100 each, Rfl: 1  •  sertraline (ZOLOFT) 100 MG tablet, TAKE ONE TABLET BY MOUTH DAILY (Patient taking differently: 50 mg.), Disp: 90 tablet, Rfl: 1  •  simvastatin (ZOCOR) 10 MG tablet, Take 10 mg by mouth Every Night., Disp: , Rfl:   •  vitamin D3 125 MCG (5000 UT) capsule capsule, Take 5,000 Units by mouth Daily., Disp: , Rfl:   •  fluticasone (FLONASE) 50 MCG/ACT nasal spray, 2 sprays into the nostril(s) as directed by provider Daily for 30 days. 1 spray in each nostril daily, Disp: 16 g, Rfl: 0  Social History:   Social History     Tobacco Use   • Smoking status: Never Smoker   • Smokeless tobacco: Never Used   Substance Use Topics   • Alcohol use: No      Family History:  Family History   Problem Relation Age of Onset   • Stroke Mother    • Stroke Father    • Heart disease Brother    • Heart attack Son    • Heart disease Son               Review of Systems   Constitutional: Positive for malaise/fatigue. Negative for fever.   Cardiovascular: Negative for chest pain, dyspnea on exertion and palpitations.   Respiratory: Negative for cough and shortness of breath.    Skin: Negative for rash.   Gastrointestinal: Negative for abdominal pain, nausea and vomiting.   Neurological: Positive for dizziness, light-headedness and numbness. Negative for focal weakness and headaches.   All other systems reviewed and are negative.    All other systems are negative         Objective:     Physical Exam  /84   Pulse 53   Ht 157.5 cm (62\")   Wt 82.6 kg (182 lb)   BMI 33.29 kg/m²   General:  Appears in no acute distress  Eyes: Sclera is anicteric,  conjunctiva is clear   HEENT:  No JVD.  No carotid " "bruits  Respiratory: Respirations regular and unlabored at rest.  Clear to auscultation  Cardiovascular: S1,S2 Regular rate and rhythm. No murmur, rub or gallop auscultated.   Extremities: No digital clubbing or cyanosis, no edema  Skin: Color pink. Skin warm and dry to touch. No rashes  No xanthoma  Neuro: Alert and awake.    Lab Reviewed:         Deric Johnson MD  8/14/2022 11:39 EDT      EMR Dragon/Transcription:   \"Dictated utilizing Dragon dictation\".        "

## 2022-08-16 LAB
BH CV REST NUCLEAR ISOTOPE DOSE: 10.9 MCI
BH CV STRESS COMMENTS STAGE 1: NORMAL
BH CV STRESS DOSE REGADENOSON STAGE 1: 0.4
BH CV STRESS DURATION MIN STAGE 1: 0
BH CV STRESS DURATION SEC STAGE 1: 10
BH CV STRESS NUCLEAR ISOTOPE DOSE: 30.9 MCI
BH CV STRESS PROTOCOL 1: NORMAL
BH CV STRESS RECOVERY BP: NORMAL MMHG
BH CV STRESS RECOVERY HR: 68 BPM
BH CV STRESS STAGE 1: 1
MAXIMAL PREDICTED HEART RATE: 136 BPM
STRESS BASELINE BP: NORMAL MMHG
STRESS BASELINE HR: 52 BPM
STRESS TARGET HR: 116 BPM

## 2022-08-18 LAB
BH CV ECHO MEAS - ACS: 1.85 CM
BH CV ECHO MEAS - AO MAX PG: 8.7 MMHG
BH CV ECHO MEAS - AO MEAN PG: 4.5 MMHG
BH CV ECHO MEAS - AO ROOT DIAM: 3 CM
BH CV ECHO MEAS - AO V2 MAX: 147.4 CM/SEC
BH CV ECHO MEAS - AO V2 VTI: 34.2 CM
BH CV ECHO MEAS - AVA(I,D): 2 CM2
BH CV ECHO MEAS - EDV(CUBED): 64 ML
BH CV ECHO MEAS - EDV(MOD-SP4): 67.1 ML
BH CV ECHO MEAS - EF(MOD-BP): 57 %
BH CV ECHO MEAS - EF(MOD-SP4): 57.7 %
BH CV ECHO MEAS - ESV(CUBED): 27.9 ML
BH CV ECHO MEAS - ESV(MOD-SP4): 28.4 ML
BH CV ECHO MEAS - FS: 24.2 %
BH CV ECHO MEAS - IVS/LVPW: 1.15 CM
BH CV ECHO MEAS - IVSD: 1.24 CM
BH CV ECHO MEAS - LA DIMENSION: 3.4 CM
BH CV ECHO MEAS - LV DIASTOLIC VOL/BSA (35-75): 36.7 CM2
BH CV ECHO MEAS - LV MASS(C)D: 157.4 GRAMS
BH CV ECHO MEAS - LV MAX PG: 3.7 MMHG
BH CV ECHO MEAS - LV MEAN PG: 1.56 MMHG
BH CV ECHO MEAS - LV SYSTOLIC VOL/BSA (12-30): 15.5 CM2
BH CV ECHO MEAS - LV V1 MAX: 95.9 CM/SEC
BH CV ECHO MEAS - LV V1 VTI: 24.5 CM
BH CV ECHO MEAS - LVIDD: 4 CM
BH CV ECHO MEAS - LVIDS: 3 CM
BH CV ECHO MEAS - LVOT AREA: 2.8 CM2
BH CV ECHO MEAS - LVOT DIAM: 1.88 CM
BH CV ECHO MEAS - LVPWD: 1.08 CM
BH CV ECHO MEAS - MV A MAX VEL: 157 CM/SEC
BH CV ECHO MEAS - MV DEC SLOPE: 467.4 CM/SEC2
BH CV ECHO MEAS - MV DEC TIME: 238 MSEC
BH CV ECHO MEAS - MV E MAX VEL: 111.2 CM/SEC
BH CV ECHO MEAS - MV E/A: 0.71
BH CV ECHO MEAS - MV MAX PG: 9.8 MMHG
BH CV ECHO MEAS - MV MEAN PG: 1.9 MMHG
BH CV ECHO MEAS - MV V2 VTI: 34.9 CM
BH CV ECHO MEAS - MVA(VTI): 1.96 CM2
BH CV ECHO MEAS - PA ACC TIME: 0.16 SEC
BH CV ECHO MEAS - PA PR(ACCEL): 7.4 MMHG
BH CV ECHO MEAS - PA V2 MAX: 112 CM/SEC
BH CV ECHO MEAS - PI END-D VEL: 102 CM/SEC
BH CV ECHO MEAS - PULM DIAS VEL: 53.9 CM/SEC
BH CV ECHO MEAS - PULM S/D: 1.15
BH CV ECHO MEAS - PULM SYS VEL: 61.8 CM/SEC
BH CV ECHO MEAS - RAP SYSTOLE: 3 MMHG
BH CV ECHO MEAS - RVDD: 2.8 CM
BH CV ECHO MEAS - RVOT DIAM: 1.96 CM
BH CV ECHO MEAS - RVSP: 25.4 MMHG
BH CV ECHO MEAS - SI(MOD-SP4): 21.2 ML/M2
BH CV ECHO MEAS - SV(LVOT): 68.4 ML
BH CV ECHO MEAS - SV(MOD-SP4): 38.7 ML
BH CV ECHO MEAS - TR MAX PG: 22.4 MMHG
BH CV ECHO MEAS - TR MAX VEL: 235.9 CM/SEC
MAXIMAL PREDICTED HEART RATE: 136 BPM
STRESS TARGET HR: 116 BPM

## 2022-08-22 RX ORDER — GLIMEPIRIDE 2 MG/1
TABLET ORAL
Qty: 180 TABLET | Refills: 1 | Status: SHIPPED | OUTPATIENT
Start: 2022-08-22 | End: 2022-12-19

## 2022-09-29 ENCOUNTER — TRANSCRIBE ORDERS (OUTPATIENT)
Dept: ADMINISTRATIVE | Facility: HOSPITAL | Age: 82
End: 2022-09-29

## 2022-09-29 ENCOUNTER — LAB (OUTPATIENT)
Dept: LAB | Facility: HOSPITAL | Age: 82
End: 2022-09-29

## 2022-09-29 DIAGNOSIS — N18.30 STAGE 3 CHRONIC KIDNEY DISEASE, UNSPECIFIED WHETHER STAGE 3A OR 3B CKD: Primary | ICD-10-CM

## 2022-09-29 DIAGNOSIS — N18.30 STAGE 3 CHRONIC KIDNEY DISEASE, UNSPECIFIED WHETHER STAGE 3A OR 3B CKD: ICD-10-CM

## 2022-09-29 LAB
ANION GAP SERPL CALCULATED.3IONS-SCNC: 11.2 MMOL/L (ref 5–15)
BACTERIA UR QL AUTO: ABNORMAL /HPF
BASOPHILS # BLD AUTO: 0.06 10*3/MM3 (ref 0–0.2)
BASOPHILS NFR BLD AUTO: 0.9 % (ref 0–1.5)
BILIRUB UR QL STRIP: NEGATIVE
BUN SERPL-MCNC: 32 MG/DL (ref 8–23)
BUN/CREAT SERPL: 19 (ref 7–25)
CALCIUM SPEC-SCNC: 9.2 MG/DL (ref 8.6–10.5)
CHLORIDE SERPL-SCNC: 103 MMOL/L (ref 98–107)
CLARITY UR: ABNORMAL
CO2 SERPL-SCNC: 23.8 MMOL/L (ref 22–29)
COLOR UR: YELLOW
CREAT SERPL-MCNC: 1.68 MG/DL (ref 0.57–1)
DEPRECATED RDW RBC AUTO: 40.9 FL (ref 37–54)
EGFRCR SERPLBLD CKD-EPI 2021: 29.9 ML/MIN/1.73
EOSINOPHIL # BLD AUTO: 0.34 10*3/MM3 (ref 0–0.4)
EOSINOPHIL NFR BLD AUTO: 5.1 % (ref 0.3–6.2)
ERYTHROCYTE [DISTWIDTH] IN BLOOD BY AUTOMATED COUNT: 12.3 % (ref 12.3–15.4)
GLUCOSE SERPL-MCNC: 113 MG/DL (ref 65–99)
GLUCOSE UR STRIP-MCNC: NEGATIVE MG/DL
HCT VFR BLD AUTO: 37.4 % (ref 34–46.6)
HGB BLD-MCNC: 12.4 G/DL (ref 12–15.9)
HGB UR QL STRIP.AUTO: ABNORMAL
HYALINE CASTS UR QL AUTO: ABNORMAL /LPF
IMM GRANULOCYTES # BLD AUTO: 0.02 10*3/MM3 (ref 0–0.05)
IMM GRANULOCYTES NFR BLD AUTO: 0.3 % (ref 0–0.5)
KETONES UR QL STRIP: ABNORMAL
LEUKOCYTE ESTERASE UR QL STRIP.AUTO: ABNORMAL
LYMPHOCYTES # BLD AUTO: 1.84 10*3/MM3 (ref 0.7–3.1)
LYMPHOCYTES NFR BLD AUTO: 27.8 % (ref 19.6–45.3)
MCH RBC QN AUTO: 30.4 PG (ref 26.6–33)
MCHC RBC AUTO-ENTMCNC: 33.2 G/DL (ref 31.5–35.7)
MCV RBC AUTO: 91.7 FL (ref 79–97)
MONOCYTES # BLD AUTO: 0.69 10*3/MM3 (ref 0.1–0.9)
MONOCYTES NFR BLD AUTO: 10.4 % (ref 5–12)
NEUTROPHILS NFR BLD AUTO: 3.67 10*3/MM3 (ref 1.7–7)
NEUTROPHILS NFR BLD AUTO: 55.5 % (ref 42.7–76)
NITRITE UR QL STRIP: NEGATIVE
NRBC BLD AUTO-RTO: 0 /100 WBC (ref 0–0.2)
PH UR STRIP.AUTO: 6.5 [PH] (ref 5–8)
PLATELET # BLD AUTO: 265 10*3/MM3 (ref 140–450)
PMV BLD AUTO: 10.5 FL (ref 6–12)
POTASSIUM SERPL-SCNC: 4.4 MMOL/L (ref 3.5–5.2)
PROT UR QL STRIP: ABNORMAL
RBC # BLD AUTO: 4.08 10*6/MM3 (ref 3.77–5.28)
RBC # UR STRIP: ABNORMAL /HPF
REF LAB TEST METHOD: ABNORMAL
SODIUM SERPL-SCNC: 138 MMOL/L (ref 136–145)
SP GR UR STRIP: 1.02 (ref 1–1.03)
SQUAMOUS #/AREA URNS HPF: ABNORMAL /HPF
UROBILINOGEN UR QL STRIP: ABNORMAL
WBC # UR STRIP: ABNORMAL /HPF
WBC NRBC COR # BLD: 6.62 10*3/MM3 (ref 3.4–10.8)

## 2022-09-29 PROCEDURE — 81001 URINALYSIS AUTO W/SCOPE: CPT

## 2022-09-29 PROCEDURE — 87186 SC STD MICRODIL/AGAR DIL: CPT

## 2022-09-29 PROCEDURE — 87086 URINE CULTURE/COLONY COUNT: CPT

## 2022-09-29 PROCEDURE — 36415 COLL VENOUS BLD VENIPUNCTURE: CPT

## 2022-09-29 PROCEDURE — 85025 COMPLETE CBC W/AUTO DIFF WBC: CPT

## 2022-09-29 PROCEDURE — 87077 CULTURE AEROBIC IDENTIFY: CPT

## 2022-09-29 PROCEDURE — 80048 BASIC METABOLIC PNL TOTAL CA: CPT

## 2022-09-30 ENCOUNTER — TELEPHONE (OUTPATIENT)
Dept: FAMILY MEDICINE CLINIC | Facility: CLINIC | Age: 82
End: 2022-09-30

## 2022-09-30 NOTE — TELEPHONE ENCOUNTER
Caller: Franci Patterson    Relationship: Emergency Contact    Best call back number: [phone]    What medication are you requesting: ANTIBIOTIC FOR UTI     What are your current symptoms: BURNING URINATION, FREQUENT URINATION, BLOOD IN URINE     How long have you been experiencing symptoms: 3 WEEKS    Have you had these symptoms before:    [x] Yes  [] No    Have you been treated for these symptoms before:   [x] Yes  [] No    If a prescription is needed, what is your preferred pharmacy and phone number:  [facility] [id number] - [facility] [place], [place] - [address] - [phone] [facility] [phone] FX     Additional notes: PLEASE ADVISE. DAUGHTER IN LAW CALLED AND SAID THAT THE PATIENT HAD LABS DONE AT [facility] AND THEY SHOWED HER HAVING A UTI.

## 2022-09-30 NOTE — TELEPHONE ENCOUNTER
Franci advised to call nephrology office. They did not ask her to call, she just thought that pcp would have to write it.

## 2022-09-30 NOTE — TELEPHONE ENCOUNTER
Her nephrologist ordered her urine and labs.  They usually treat the infection themselves.  Did they tell her to call us

## 2022-10-01 LAB — BACTERIA SPEC AEROBE CULT: ABNORMAL

## 2022-10-20 ENCOUNTER — TELEPHONE (OUTPATIENT)
Dept: FAMILY MEDICINE CLINIC | Facility: CLINIC | Age: 82
End: 2022-10-20

## 2022-10-20 DIAGNOSIS — R30.0 DYSURIA: Primary | ICD-10-CM

## 2022-10-20 NOTE — TELEPHONE ENCOUNTER
If possible,she really needs to come in and give us a urine or I can put the order in the computer and she can go to the hosp lab and do it there

## 2022-10-20 NOTE — TELEPHONE ENCOUNTER
Caller: Fuentes Patterson    Relationship: Emergency Contact    Best call back number: [phone]    Who are you requesting to speak with (clinical staff, provider,  specific staff member): CLINICAL    What was the call regarding: PATIENT'S DAUGHTER IN LAW, FUENTES IS CALLING STATING THAT THE PATIENT IS HAVING BURNING AND URGENCY AND FEELS AS THOUGH SHE HAS A UTI.  THE OFFICE DID NOT HAVE ANY SAME DAY VISITS AVAILABLE THIS WEEK TO SCHEDULE PATIENT FOR AN APPOINTMENT, SO FUENTES WOULD LIKE TO ASK THAT DR. MORATAYA PLEASE CALL IN AN ANTIBIOTIC TO TREAT THE PATIENT'S SYMPTOMS THAT HAVE BEEN GOING ON FOR THE PAST 3-4 DAYS.  FUENTES WOULD LIKE DR. MORATAYA TO PLEASE LOOK BACK AT DR. FRIAS'S RECORDS FROM ABOUT A MONTH AGO AS PATIENT HAD A UTI THEN, AND DR. FRIAS CULTURED HER URINE AND PLACED HER IN AN ANTIBIOTIC.    PLEASE CONTACT FUENTES TO ADVISE.     Do you require a callback: YES

## 2022-10-21 ENCOUNTER — LAB (OUTPATIENT)
Dept: FAMILY MEDICINE CLINIC | Facility: CLINIC | Age: 82
End: 2022-10-21

## 2022-10-21 DIAGNOSIS — R30.0 DYSURIA: ICD-10-CM

## 2022-10-21 LAB
BACTERIA UR QL AUTO: ABNORMAL /HPF
BILIRUB UR QL STRIP: NEGATIVE
CLARITY UR: ABNORMAL
COLOR UR: YELLOW
GLUCOSE UR STRIP-MCNC: NEGATIVE MG/DL
HGB UR QL STRIP.AUTO: ABNORMAL
HYALINE CASTS UR QL AUTO: ABNORMAL /LPF
KETONES UR QL STRIP: NEGATIVE
LEUKOCYTE ESTERASE UR QL STRIP.AUTO: ABNORMAL
NITRITE UR QL STRIP: NEGATIVE
PH UR STRIP.AUTO: 6.5 [PH] (ref 5–8)
PROT UR QL STRIP: ABNORMAL
RBC # UR STRIP: ABNORMAL /HPF
REF LAB TEST METHOD: ABNORMAL
SP GR UR STRIP: 1.01 (ref 1–1.03)
SQUAMOUS #/AREA URNS HPF: ABNORMAL /HPF
UROBILINOGEN UR QL STRIP: ABNORMAL
WBC # UR STRIP: ABNORMAL /HPF

## 2022-10-21 PROCEDURE — 81001 URINALYSIS AUTO W/SCOPE: CPT | Performed by: FAMILY MEDICINE

## 2022-10-21 PROCEDURE — 87086 URINE CULTURE/COLONY COUNT: CPT | Performed by: FAMILY MEDICINE

## 2022-10-22 DIAGNOSIS — R30.0 DYSURIA: Primary | ICD-10-CM

## 2022-10-22 LAB — BACTERIA SPEC AEROBE CULT: NORMAL

## 2022-10-24 ENCOUNTER — LAB (OUTPATIENT)
Dept: FAMILY MEDICINE CLINIC | Facility: CLINIC | Age: 82
End: 2022-10-24

## 2022-10-24 DIAGNOSIS — R30.0 DYSURIA: ICD-10-CM

## 2022-10-24 LAB
BACTERIA UR QL AUTO: ABNORMAL /HPF
BILIRUB UR QL STRIP: NEGATIVE
CLARITY UR: ABNORMAL
COLOR UR: YELLOW
GLUCOSE UR STRIP-MCNC: NEGATIVE MG/DL
HGB UR QL STRIP.AUTO: ABNORMAL
HYALINE CASTS UR QL AUTO: ABNORMAL /LPF
KETONES UR QL STRIP: NEGATIVE
LEUKOCYTE ESTERASE UR QL STRIP.AUTO: ABNORMAL
NITRITE UR QL STRIP: NEGATIVE
PH UR STRIP.AUTO: 6 [PH] (ref 5–8)
PROT UR QL STRIP: ABNORMAL
RBC # UR STRIP: ABNORMAL /HPF
REF LAB TEST METHOD: ABNORMAL
SP GR UR STRIP: 1.02 (ref 1–1.03)
SQUAMOUS #/AREA URNS HPF: ABNORMAL /HPF
UROBILINOGEN UR QL STRIP: ABNORMAL
WBC # UR STRIP: ABNORMAL /HPF

## 2022-10-24 PROCEDURE — 87077 CULTURE AEROBIC IDENTIFY: CPT | Performed by: FAMILY MEDICINE

## 2022-10-24 PROCEDURE — 87186 SC STD MICRODIL/AGAR DIL: CPT

## 2022-10-24 PROCEDURE — 87086 URINE CULTURE/COLONY COUNT: CPT | Performed by: FAMILY MEDICINE

## 2022-10-24 PROCEDURE — 81001 URINALYSIS AUTO W/SCOPE: CPT | Performed by: FAMILY MEDICINE

## 2022-10-25 RX ORDER — CEFDINIR 300 MG/1
300 CAPSULE ORAL 2 TIMES DAILY
Qty: 14 CAPSULE | Refills: 0 | Status: SHIPPED | OUTPATIENT
Start: 2022-10-25 | End: 2022-11-01

## 2022-10-27 LAB
BACTERIA SPEC AEROBE CULT: ABNORMAL
BACTERIA SPEC AEROBE CULT: ABNORMAL

## 2022-11-15 RX ORDER — GABAPENTIN 300 MG/1
CAPSULE ORAL
Qty: 90 CAPSULE | Refills: 1 | Status: SHIPPED | OUTPATIENT
Start: 2022-11-15

## 2022-11-21 ENCOUNTER — TRANSCRIBE ORDERS (OUTPATIENT)
Dept: ADMINISTRATIVE | Facility: HOSPITAL | Age: 82
End: 2022-11-21

## 2022-11-21 ENCOUNTER — LAB (OUTPATIENT)
Dept: LAB | Facility: HOSPITAL | Age: 82
End: 2022-11-21

## 2022-11-21 DIAGNOSIS — N18.30 STAGE 3 CHRONIC KIDNEY DISEASE, UNSPECIFIED WHETHER STAGE 3A OR 3B CKD: ICD-10-CM

## 2022-11-21 DIAGNOSIS — N18.30 STAGE 3 CHRONIC KIDNEY DISEASE, UNSPECIFIED WHETHER STAGE 3A OR 3B CKD: Primary | ICD-10-CM

## 2022-11-21 LAB
ANION GAP SERPL CALCULATED.3IONS-SCNC: 13.8 MMOL/L (ref 5–15)
BACTERIA UR QL AUTO: ABNORMAL /HPF
BASOPHILS # BLD AUTO: 0.05 10*3/MM3 (ref 0–0.2)
BASOPHILS NFR BLD AUTO: 0.6 % (ref 0–1.5)
BILIRUB UR QL STRIP: NEGATIVE
BUN SERPL-MCNC: 25 MG/DL (ref 8–23)
BUN/CREAT SERPL: 14.2 (ref 7–25)
CALCIUM SPEC-SCNC: 9.3 MG/DL (ref 8.6–10.5)
CHLORIDE SERPL-SCNC: 105 MMOL/L (ref 98–107)
CLARITY UR: ABNORMAL
CO2 SERPL-SCNC: 22.2 MMOL/L (ref 22–29)
COLOR UR: YELLOW
CREAT SERPL-MCNC: 1.76 MG/DL (ref 0.57–1)
DEPRECATED RDW RBC AUTO: 40.1 FL (ref 37–54)
EGFRCR SERPLBLD CKD-EPI 2021: 28.2 ML/MIN/1.73
EOSINOPHIL # BLD AUTO: 0.34 10*3/MM3 (ref 0–0.4)
EOSINOPHIL NFR BLD AUTO: 4 % (ref 0.3–6.2)
ERYTHROCYTE [DISTWIDTH] IN BLOOD BY AUTOMATED COUNT: 12.1 % (ref 12.3–15.4)
GLUCOSE SERPL-MCNC: 150 MG/DL (ref 65–99)
GLUCOSE UR STRIP-MCNC: NEGATIVE MG/DL
HCT VFR BLD AUTO: 37.7 % (ref 34–46.6)
HGB BLD-MCNC: 12.5 G/DL (ref 12–15.9)
HGB UR QL STRIP.AUTO: ABNORMAL
HYALINE CASTS UR QL AUTO: ABNORMAL /LPF
IMM GRANULOCYTES # BLD AUTO: 0.03 10*3/MM3 (ref 0–0.05)
IMM GRANULOCYTES NFR BLD AUTO: 0.4 % (ref 0–0.5)
KETONES UR QL STRIP: ABNORMAL
LEUKOCYTE ESTERASE UR QL STRIP.AUTO: ABNORMAL
LYMPHOCYTES # BLD AUTO: 1.81 10*3/MM3 (ref 0.7–3.1)
LYMPHOCYTES NFR BLD AUTO: 21.1 % (ref 19.6–45.3)
MCH RBC QN AUTO: 29.7 PG (ref 26.6–33)
MCHC RBC AUTO-ENTMCNC: 33.2 G/DL (ref 31.5–35.7)
MCV RBC AUTO: 89.5 FL (ref 79–97)
MONOCYTES # BLD AUTO: 0.76 10*3/MM3 (ref 0.1–0.9)
MONOCYTES NFR BLD AUTO: 8.9 % (ref 5–12)
NEUTROPHILS NFR BLD AUTO: 5.58 10*3/MM3 (ref 1.7–7)
NEUTROPHILS NFR BLD AUTO: 65 % (ref 42.7–76)
NITRITE UR QL STRIP: POSITIVE
NRBC BLD AUTO-RTO: 0 /100 WBC (ref 0–0.2)
PH UR STRIP.AUTO: 5.5 [PH] (ref 5–8)
PHOSPHATE SERPL-MCNC: 3.4 MG/DL (ref 2.5–4.5)
PLATELET # BLD AUTO: 257 10*3/MM3 (ref 140–450)
PMV BLD AUTO: 10.4 FL (ref 6–12)
POTASSIUM SERPL-SCNC: 4 MMOL/L (ref 3.5–5.2)
PROT UR QL STRIP: ABNORMAL
PTH-INTACT SERPL-MCNC: 71.5 PG/ML (ref 15–65)
RBC # BLD AUTO: 4.21 10*6/MM3 (ref 3.77–5.28)
RBC # UR STRIP: ABNORMAL /HPF
REF LAB TEST METHOD: ABNORMAL
SODIUM SERPL-SCNC: 141 MMOL/L (ref 136–145)
SP GR UR STRIP: 1.02 (ref 1–1.03)
SQUAMOUS #/AREA URNS HPF: ABNORMAL /HPF
UROBILINOGEN UR QL STRIP: ABNORMAL
WBC # UR STRIP: ABNORMAL /HPF
WBC NRBC COR # BLD: 8.57 10*3/MM3 (ref 3.4–10.8)

## 2022-11-21 PROCEDURE — 80048 BASIC METABOLIC PNL TOTAL CA: CPT

## 2022-11-21 PROCEDURE — 85025 COMPLETE CBC W/AUTO DIFF WBC: CPT

## 2022-11-21 PROCEDURE — 87186 SC STD MICRODIL/AGAR DIL: CPT

## 2022-11-21 PROCEDURE — 81001 URINALYSIS AUTO W/SCOPE: CPT

## 2022-11-21 PROCEDURE — 83970 ASSAY OF PARATHORMONE: CPT

## 2022-11-21 PROCEDURE — 36415 COLL VENOUS BLD VENIPUNCTURE: CPT

## 2022-11-21 PROCEDURE — 87077 CULTURE AEROBIC IDENTIFY: CPT

## 2022-11-21 PROCEDURE — 84100 ASSAY OF PHOSPHORUS: CPT

## 2022-11-21 PROCEDURE — 87086 URINE CULTURE/COLONY COUNT: CPT

## 2022-11-23 LAB — BACTERIA SPEC AEROBE CULT: ABNORMAL

## 2022-12-15 ENCOUNTER — LAB (OUTPATIENT)
Dept: FAMILY MEDICINE CLINIC | Facility: CLINIC | Age: 82
End: 2022-12-15

## 2022-12-15 ENCOUNTER — OFFICE VISIT (OUTPATIENT)
Dept: FAMILY MEDICINE CLINIC | Facility: CLINIC | Age: 82
End: 2022-12-15

## 2022-12-15 VITALS
HEART RATE: 61 BPM | TEMPERATURE: 97.5 F | DIASTOLIC BLOOD PRESSURE: 69 MMHG | OXYGEN SATURATION: 97 % | HEIGHT: 62 IN | BODY MASS INDEX: 33.29 KG/M2 | SYSTOLIC BLOOD PRESSURE: 110 MMHG

## 2022-12-15 DIAGNOSIS — I95.1 ORTHOSTASIS: ICD-10-CM

## 2022-12-15 DIAGNOSIS — R53.83 FATIGUE, UNSPECIFIED TYPE: ICD-10-CM

## 2022-12-15 DIAGNOSIS — N39.0 FREQUENT UTI: Primary | ICD-10-CM

## 2022-12-15 LAB
BASOPHILS # BLD AUTO: 0.05 10*3/MM3 (ref 0–0.2)
BASOPHILS NFR BLD AUTO: 0.8 % (ref 0–1.5)
BILIRUB BLD-MCNC: NEGATIVE MG/DL
CLARITY, POC: ABNORMAL
COLOR UR: YELLOW
DEPRECATED RDW RBC AUTO: 40.8 FL (ref 37–54)
EOSINOPHIL # BLD AUTO: 0.33 10*3/MM3 (ref 0–0.4)
EOSINOPHIL NFR BLD AUTO: 5.3 % (ref 0.3–6.2)
ERYTHROCYTE [DISTWIDTH] IN BLOOD BY AUTOMATED COUNT: 12.4 % (ref 12.3–15.4)
EXPIRATION DATE: ABNORMAL
GLUCOSE UR STRIP-MCNC: NEGATIVE MG/DL
HCT VFR BLD AUTO: 36.8 % (ref 34–46.6)
HGB BLD-MCNC: 12.1 G/DL (ref 12–15.9)
IMM GRANULOCYTES # BLD AUTO: 0.01 10*3/MM3 (ref 0–0.05)
IMM GRANULOCYTES NFR BLD AUTO: 0.2 % (ref 0–0.5)
KETONES UR QL: NEGATIVE
LEUKOCYTE EST, POC: ABNORMAL
LYMPHOCYTES # BLD AUTO: 1.63 10*3/MM3 (ref 0.7–3.1)
LYMPHOCYTES NFR BLD AUTO: 26.1 % (ref 19.6–45.3)
Lab: ABNORMAL
MCH RBC QN AUTO: 29.8 PG (ref 26.6–33)
MCHC RBC AUTO-ENTMCNC: 32.9 G/DL (ref 31.5–35.7)
MCV RBC AUTO: 90.6 FL (ref 79–97)
MONOCYTES # BLD AUTO: 0.6 10*3/MM3 (ref 0.1–0.9)
MONOCYTES NFR BLD AUTO: 9.6 % (ref 5–12)
NEUTROPHILS NFR BLD AUTO: 3.63 10*3/MM3 (ref 1.7–7)
NEUTROPHILS NFR BLD AUTO: 58 % (ref 42.7–76)
NITRITE UR-MCNC: POSITIVE MG/ML
NRBC BLD AUTO-RTO: 0.2 /100 WBC (ref 0–0.2)
PH UR: 5 [PH] (ref 5–8)
PLATELET # BLD AUTO: 251 10*3/MM3 (ref 140–450)
PMV BLD AUTO: 10.9 FL (ref 6–12)
PROT UR STRIP-MCNC: NEGATIVE MG/DL
RBC # BLD AUTO: 4.06 10*6/MM3 (ref 3.77–5.28)
RBC # UR STRIP: ABNORMAL /UL
SP GR UR: 1.02 (ref 1–1.03)
TSH SERPL DL<=0.05 MIU/L-ACNC: 3.11 UIU/ML (ref 0.27–4.2)
UROBILINOGEN UR QL: NORMAL
WBC NRBC COR # BLD: 6.25 10*3/MM3 (ref 3.4–10.8)

## 2022-12-15 PROCEDURE — 84443 ASSAY THYROID STIM HORMONE: CPT | Performed by: FAMILY MEDICINE

## 2022-12-15 PROCEDURE — 87186 SC STD MICRODIL/AGAR DIL: CPT | Performed by: FAMILY MEDICINE

## 2022-12-15 PROCEDURE — 99214 OFFICE O/P EST MOD 30 MIN: CPT | Performed by: FAMILY MEDICINE

## 2022-12-15 PROCEDURE — 36415 COLL VENOUS BLD VENIPUNCTURE: CPT | Performed by: FAMILY MEDICINE

## 2022-12-15 PROCEDURE — 87086 URINE CULTURE/COLONY COUNT: CPT | Performed by: FAMILY MEDICINE

## 2022-12-15 PROCEDURE — 85025 COMPLETE CBC W/AUTO DIFF WBC: CPT | Performed by: FAMILY MEDICINE

## 2022-12-15 PROCEDURE — 87077 CULTURE AEROBIC IDENTIFY: CPT | Performed by: FAMILY MEDICINE

## 2022-12-15 PROCEDURE — 81003 URINALYSIS AUTO W/O SCOPE: CPT | Performed by: FAMILY MEDICINE

## 2022-12-15 RX ORDER — CEFDINIR 300 MG/1
300 CAPSULE ORAL 2 TIMES DAILY
Qty: 14 CAPSULE | Refills: 0 | Status: SHIPPED | OUTPATIENT
Start: 2022-12-15 | End: 2022-12-22

## 2022-12-15 RX ORDER — ATORVASTATIN CALCIUM 20 MG/1
0.5 TABLET, FILM COATED ORAL DAILY
COMMUNITY
Start: 2022-10-16 | End: 2023-01-15

## 2022-12-15 NOTE — PROGRESS NOTES
Answers for HPI/ROS submitted by the patient on 12/13/2022  Please describe your symptoms.: Frequent UTIs.  3 rounds of antibiotics so far  Have you had these symptoms before?: Yes  How long have you been having these symptoms?: Greater than 2 weeks  Please list any medications you are currently taking for this condition.: None currently  What is the primary reason for your visit?: Other    Subjective   Lynda SONYA Patterson is a 82 y.o. female.     History of Present Illness  She comes in today and is in a wheelchair as she was feeling weak and dizzy.  She says she has felt this way all morning.  She says she has not had any breakfast or had any medications yet.  She relates that she has had 3 urinary tract infections since August.  She just finished her third round of antibiotics a little over a week ago.  She denies any current symptoms.  She is complaining of fatigue.  She says she just does not feel like she has any energy.  She says she just wants to sit around the house all day and finds that very depressing.  Has had visiting angels coming and they plan to re-evaluate her in Feb  Sees DR. Quiles for her UTI  Eating and drinking ok  No N/V/D  Family has made her an appt with urology on Dec 30  BS have been fluctuating and she has seen some 300's       The following portions of the patient's history were reviewed and updated as appropriate: allergies, current medications, past family history, past medical history, past social history, past surgical history, and problem list.  Past Medical History:   Diagnosis Date   • Anxiety    • Depression    • DM2 (diabetes mellitus, type 2) ([facility])    • Gout    • Heart disease    • Hyperlipidemia    • Hypertension    • Kidney failure     Stage three kidney failure , abstraction from [facility]   • Macular degeneration    • Nondisplaced fracture of proximal end of left fibula 9/3/2021   • Sprain of left ankle, initial encounter 8/20/2021     Past Surgical History:    Procedure Laterality Date   • CHOLECYSTECTOMY  1988   • TOTAL ABDOMINAL HYSTERECTOMY  1970     Family History   Problem Relation Age of Onset   • Stroke Mother    • Stroke Father    • Heart disease Brother    • Heart attack Son    • Heart disease Son      Social History     Socioeconomic History   • Marital status:    Tobacco Use   • Smoking status: Never   • Smokeless tobacco: Never   Vaping Use   • Vaping Use: Never used   Substance and Sexual Activity   • Alcohol use: No   • Drug use: No   • Sexual activity: Defer         Current Outpatient Medications:   •  atorvastatin (LIPITOR) 20 MG tablet, Take 0.5 tablets by mouth Daily. 1/2 tab daily, Disp: , Rfl:   •  gabapentin (NEURONTIN) 300 MG capsule, TAKE ONE CAPSULE BY MOUTH EVERY NIGHT AT BEDTIME, Disp: 90 capsule, Rfl: 1  •  metFORMIN (GLUCOPHAGE) 500 MG tablet, TAKE ONE TABLET BY MOUTH TWICE A DAY, Disp: 180 tablet, Rfl: 1  •  Multiple Vitamins-Minerals (ICAPS) tablet, ICAPS AREDS FORMULA TABS, Disp: , Rfl:   •  ONE TOUCH LANCETS misc, ONETOUCH LANCETS, Disp: , Rfl:   •  OneTouch Ultra test strip, TEST BLOOD SUGAR ONCE DAILY, Disp: 100 each, Rfl: 1  •  sertraline (ZOLOFT) 100 MG tablet, TAKE ONE TABLET BY MOUTH DAILY (Patient taking differently: 50 mg.), Disp: 90 tablet, Rfl: 1  •  vitamin D3 125 MCG (5000 UT) capsule capsule, Take 5,000 Units by mouth Daily., Disp: , Rfl:   •  cefdinir (OMNICEF) 300 MG capsule, Take 1 capsule by mouth 2 (Two) Times a Day for 7 days., Disp: 14 capsule, Rfl: 0  •  fluticasone (FLONASE) 50 MCG/ACT nasal spray, 2 sprays into the nostril(s) as directed by provider Daily for 30 days. 1 spray in each nostril daily, Disp: 16 g, Rfl: 0  •  glimepiride (AMARYL) 2 MG tablet, TAKE ONE TABLET BY MOUTH TWICE A DAY WITH MEALS, Disp: 180 tablet, Rfl: 1    Review of Systems   Constitutional: Positive for fatigue. Negative for activity change, appetite change, chills, diaphoresis and fever.   Respiratory: Negative.    Cardiovascular:  "Negative.    Gastrointestinal: Negative for diarrhea, nausea and vomiting.   Genitourinary: Negative for dysuria, frequency and hematuria.   Neurological: Positive for dizziness and weakness. Negative for syncope, light-headedness and headache.     /69 (BP Location: Right arm, Patient Position: Sitting, Cuff Size: Large Adult)   Pulse 61   Temp 97.5 °F (36.4 °C) (Temporal)   Ht 157.5 cm (62\")   SpO2 97%   BMI 33.29 kg/m²       Objective   Physical Exam  Vitals and nursing note reviewed.   Constitutional:       General: She is not in acute distress.     Appearance: She is well-developed. She is obese.      Comments: In a WC     HENT:      Head: Normocephalic and atraumatic.      Right Ear: Tympanic membrane and ear canal normal.      Left Ear: Tympanic membrane and ear canal normal.   Eyes:      Conjunctiva/sclera: Conjunctivae normal.   Neck:      Thyroid: No thyromegaly.   Cardiovascular:      Rate and Rhythm: Normal rate and regular rhythm.      Heart sounds: Normal heart sounds. No murmur heard.    No friction rub. No gallop.   Pulmonary:      Effort: Pulmonary effort is normal. No respiratory distress.      Breath sounds: Normal breath sounds. No wheezing or rales.   Abdominal:      General: Abdomen is flat. Bowel sounds are normal.      Palpations: Abdomen is soft.      Tenderness: There is no abdominal tenderness. There is no right CVA tenderness or left CVA tenderness.   Musculoskeletal:      Cervical back: Neck supple.      Right lower leg: No edema.      Left lower leg: No edema.   Lymphadenopathy:      Cervical: No cervical adenopathy.   Skin:     General: Skin is warm and dry.      Comments: Good skin turgor   Neurological:      Mental Status: She is alert.   Psychiatric:         Behavior: Behavior is cooperative.       Vitals:    12/15/22 0937 12/15/22 1014 12/15/22 1017 12/15/22 1018   Orthostatic BP:  134/79 130/79 114/75   Orthostatic Pulse:  59 63 69   Patient Position: Sitting Lying " Sitting Standing     Brief Urine Lab Results  (Last result in the past 365 days)      Color   Clarity   Blood   Leuk Est   Nitrite   Protein   CREAT   Urine HCG        12/15/22 1015 Yellow   Slightly Cloudy   Trace   Large (3+)   Positive   Negative                     Assessment & Plan   Problems Addressed this Visit        Symptoms and Signs    Fatigue   Other Visit Diagnoses     Frequent UTI    -  Primary    Relevant Medications    cefdinir (OMNICEF) 300 MG capsule    Orthostasis          Diagnoses       Codes Comments    Frequent UTI    -  Primary ICD-10-CM: N39.0  ICD-9-CM: 599.0     Fatigue, unspecified type     ICD-10-CM: R53.83  ICD-9-CM: 780.79     Orthostasis     ICD-10-CM: I95.1  ICD-9-CM: 458.0           Will check labs to further eval her fatigue and orthostasis  Will start omnicef for uti and send urine for culture  She will keep appt to see urology  She was encouraged to stay hydrated and avoid sudden movements  32 min spent reviewing chart, taking history, doing exam, reviewing orthostatics and urine, counseling patient, ordering labs and rx and completing chart

## 2022-12-17 LAB — BACTERIA SPEC AEROBE CULT: ABNORMAL

## 2023-01-06 ENCOUNTER — TELEPHONE (OUTPATIENT)
Dept: FAMILY MEDICINE CLINIC | Facility: CLINIC | Age: 83
End: 2023-01-06
Payer: MEDICARE

## 2023-01-06 NOTE — TELEPHONE ENCOUNTER
Pt's son Allen dropped off FMLA form today.  He requests that we call when ready (he says he can fax it)  Also would like a copy.  Please call [phone] when complete.

## 2023-01-15 RX ORDER — ATORVASTATIN CALCIUM 20 MG/1
TABLET, FILM COATED ORAL
Qty: 45 TABLET | Refills: 0 | Status: SHIPPED | OUTPATIENT
Start: 2023-01-15

## 2023-01-16 ENCOUNTER — TELEPHONE (OUTPATIENT)
Dept: CARDIOLOGY | Facility: CLINIC | Age: 83
End: 2023-01-16
Payer: MEDICARE

## 2023-01-16 NOTE — TELEPHONE ENCOUNTER
I S/W PT REGARDING RESCHEDULING APPT WITH DR ARMENTA ON 2/16/23 DUE TO DR ARMENTA BEING OUT OF THE OFFICE. PT STATES SHE WILL HAVE HER DAUGHTER FUENTES CALL TO RESCHEDULE HER APPT BECAUSE SHE TAKES CARE OF ALL SCHEDULING FOR THE PT.

## 2023-01-17 ENCOUNTER — TELEPHONE (OUTPATIENT)
Dept: CARDIOLOGY | Facility: CLINIC | Age: 83
End: 2023-01-17
Payer: MEDICARE

## 2023-01-17 RX ORDER — SERTRALINE HYDROCHLORIDE 100 MG/1
TABLET, FILM COATED ORAL
Qty: 90 TABLET | Refills: 1 | Status: SHIPPED | OUTPATIENT
Start: 2023-01-17 | End: 2023-02-20

## 2023-01-17 NOTE — TELEPHONE ENCOUNTER
Caller: Franci Patterson    Relationship to patient: Child    Best call back number: [phone]    Type of visit: HOLTER MONITOR & FU    Requested date: ANY    If rescheduling, when is the original appointment: HOLTER 2.2.23   FU 2.16.23     Additional notes: PTS DIL REPORTS THAT PT CAN COME TO HOLTER MONITOR APPT IF IT CAN BE RESCHEDULED FOR LATER IN THE MORNING (10:30)

## 2023-02-20 ENCOUNTER — OFFICE VISIT (OUTPATIENT)
Dept: CARDIOLOGY | Facility: CLINIC | Age: 83
End: 2023-02-20
Payer: MEDICARE

## 2023-02-20 VITALS
BODY MASS INDEX: 32.02 KG/M2 | HEIGHT: 62 IN | SYSTOLIC BLOOD PRESSURE: 134 MMHG | HEART RATE: 61 BPM | DIASTOLIC BLOOD PRESSURE: 63 MMHG | OXYGEN SATURATION: 96 % | WEIGHT: 174 LBS

## 2023-02-20 DIAGNOSIS — I10 ESSENTIAL HYPERTENSION: ICD-10-CM

## 2023-02-20 DIAGNOSIS — E11.9 TYPE 2 DIABETES MELLITUS WITHOUT COMPLICATION, WITHOUT LONG-TERM CURRENT USE OF INSULIN: ICD-10-CM

## 2023-02-20 DIAGNOSIS — E78.5 DYSLIPIDEMIA: ICD-10-CM

## 2023-02-20 DIAGNOSIS — R00.1 BRADYCARDIA: ICD-10-CM

## 2023-02-20 DIAGNOSIS — E66.9 OBESITY (BMI 30-39.9): ICD-10-CM

## 2023-02-20 DIAGNOSIS — R55 RECURRENT SYNCOPE: Primary | ICD-10-CM

## 2023-02-20 PROCEDURE — 99214 OFFICE O/P EST MOD 30 MIN: CPT | Performed by: INTERNAL MEDICINE

## 2023-02-20 NOTE — PROGRESS NOTES
Subjective:     Encounter Date:02/20/2023      Patient ID: Lynda Patterson is a 83 y.o. female.    Chief Complaint : follow-up for recurrent syncope and bradycardia    History of Present Illness        Ms. Lynda Patterson has PMH of    CAD, details of which are not available   Hypertension  Dyslipidemia  Type 2 diabetes  CKD  Cholecystectomy, hysterectomy  Non-smoker  Allergies/intolerance to Cipro, penicillin, sulfa  Positive family history of premature CAD in patient's son        Here for   follow-up.  Patient was seen for evaluation of recurrent syncope.  Patient has been noticing in the last 6 months has been having recurrent syncope with no aggravating or relieving factors.  Was found to be orthostatic lisinopril was stopped and continues to have recurrent syncope.  Had slow heart rates.  Denies any chest pain.  Has dyspnea on exertion.  Walks with a cane.    Patient's arterial blood pressure is 134/84, heart rate 53 bpm.  BMI is over 30.    Review of records reveal labs from 6/23/2022 reveal a BUN/creatinine of 43/8.6 and a glucose of 119.  Hemoglobin A1c of 5.8 improved from 6.4 on 4/28/2021.  Folate was low.  B12 was normal, TSH was normal.  Lipid profile with cholesterol 235, triglycerides 121, HDL 60, .    Labs from 12/15/22 revealed CBC normal, TSH normal      Assessment:  :    Recurrent syncope  Bradycardia  Dyspnea on exertion  CAD  Hypertension  Dyslipidemia  Diabetes  CKD  Obesity with BMI over 30  Positive family history of premature CAD    Recommendations / Plan:          Patient underwent Lexiscan Cardiolite reviewed/interpreted by me 8/12/2022 which revealed normal perfusion EF of 77%.  Holter monitor 2/2/2023 reveals heart rates /62 bpm with short 3-5 beat run of PAT.  Patient's symptoms of dizziness and monitor stated with arrhythmia on the monitor.  Patient is bradycardic is not on any drugs which can cause bradycardia and TSH is normal.  Most probably might end  up requiring pacemaker.  We will schedule implantable loop recorder to correlate symptoms with rhythm.  We will follow-up and consider the same.    Procedures Lexiscan Cardiolite performed 8/12/2022 reviewed/interpreted by me reveals normal perfusion EF of 77%        Procedures    Copied text in this portion of the note has been reviewed and is accurate as of 2/20/2023  The following portions of the patient's history were reviewed and updated as appropriate: allergies, current medications, past family history, past medical history, past social history, past surgical history and problem list.    Assessment:         [facility]     Diagnosis Plan   1. Recurrent syncope  Loop Recorder Implant - Treatment Room      2. Bradycardia  Loop Recorder Implant - Treatment Room      3. Essential hypertension  Loop Recorder Implant - Treatment Room      4. Dyslipidemia  Loop Recorder Implant - Treatment Room      5. Type 2 diabetes mellitus without complication, without long-term current use of insulin (HCC)  Loop Recorder Implant - Treatment Room      6. Obesity (BMI 30-39.9)  Loop Recorder Implant - Treatment Room             Plan:               Past Medical History:  Past Medical History:   Diagnosis Date   • Anxiety    • Depression    • DM2 (diabetes mellitus, type 2) ([facility])    • Gout    • Heart disease    • Hyperlipidemia    • Hypertension    • Kidney failure     Stage three kidney failure , abstraction from [facility]   • Macular degeneration    • Nondisplaced fracture of proximal end of left fibula 9/3/2021   • Sprain of left ankle, initial encounter 8/20/2021     Past Surgical History:  Past Surgical History:   Procedure Laterality Date   • CHOLECYSTECTOMY  1988   • TOTAL ABDOMINAL HYSTERECTOMY  1970      Allergies:  Allergies   Allergen Reactions   • Ciprofloxacin Nausea And Vomiting   • Penicillin G Nausea And Vomiting   • Sulfa Antibiotics Nausea And Vomiting     Home Meds:  Current Meds:     Current Outpatient Medications:   •  " atorvastatin (LIPITOR) 20 MG tablet, TAKE 1/2 TABLET BY MOUTH EVERY NIGHT AT BEDTIME, Disp: 45 tablet, Rfl: 0  •  gabapentin (NEURONTIN) 300 MG capsule, TAKE ONE CAPSULE BY MOUTH EVERY NIGHT AT BEDTIME, Disp: 90 capsule, Rfl: 1  •  metFORMIN (GLUCOPHAGE) 500 MG tablet, TAKE ONE TABLET BY MOUTH TWICE A DAY, Disp: 180 tablet, Rfl: 1  •  ONE TOUCH LANCETS misc, ONETOUCH LANCETS, Disp: , Rfl:   •  OneTouch Ultra test strip, TEST BLOOD SUGAR ONCE DAILY, Disp: 100 each, Rfl: 1  •  sertraline (ZOLOFT) 50 MG tablet, Take 50 mg by mouth Every Night., Disp: , Rfl:   •  vitamin D3 125 MCG (5000 UT) capsule capsule, Take 5,000 Units by mouth Daily., Disp: , Rfl:   •  fluticasone (FLONASE) 50 MCG/ACT nasal spray, 2 sprays into the nostril(s) as directed by provider Daily for 30 days. 1 spray in each nostril daily, Disp: 16 g, Rfl: 0  Social History:   Social History     Tobacco Use   • Smoking status: Never   • Smokeless tobacco: Never   Substance Use Topics   • Alcohol use: No      Family History:  Family History   Problem Relation Age of Onset   • Stroke Mother    • Stroke Father    • Heart disease Brother    • Heart attack Son    • Heart disease Son               Review of Systems   Cardiovascular: Positive for leg swelling. Negative for chest pain and palpitations.   Respiratory: Negative for shortness of breath.    Neurological: Positive for loss of balance. Negative for dizziness and numbness.     All other systems are negative         Objective:     Physical Exam  /63 (BP Location: Left arm, Patient Position: Sitting, Cuff Size: Large Adult)   Pulse 61   Ht 157.5 cm (62\")   Wt 78.9 kg (174 lb)   SpO2 96%   BMI 31.83 kg/m²   General:  Appears in no acute distress  Eyes: Sclera is anicteric,  conjunctiva is clear   HEENT:  No JVD.  No carotid bruits  Respiratory: Respirations regular and unlabored at rest.  Clear to auscultation  Cardiovascular: S1,S2 Regular rate and rhythm. No murmur, rub or gallop " "auscultated.   Extremities: No digital clubbing or cyanosis, no edema  Skin: Color pink. Skin warm and dry to touch. No rashes  No xanthoma  Neuro: Alert and awake.    Lab Reviewed:         Deric Johnson MD  2/20/2023 15:48 EST      EMR Dragon/Transcription:   \"Dictated utilizing Dragon dictation\".        "

## 2023-03-01 ENCOUNTER — TRANSCRIBE ORDERS (OUTPATIENT)
Dept: ADMINISTRATIVE | Facility: HOSPITAL | Age: 83
End: 2023-03-01
Payer: MEDICARE

## 2023-03-01 ENCOUNTER — LAB (OUTPATIENT)
Dept: LAB | Facility: HOSPITAL | Age: 83
End: 2023-03-01
Payer: MEDICARE

## 2023-03-01 DIAGNOSIS — N18.30 STAGE 3 CHRONIC KIDNEY DISEASE, UNSPECIFIED WHETHER STAGE 3A OR 3B CKD: Primary | ICD-10-CM

## 2023-03-01 DIAGNOSIS — N18.30 STAGE 3 CHRONIC KIDNEY DISEASE, UNSPECIFIED WHETHER STAGE 3A OR 3B CKD: ICD-10-CM

## 2023-03-01 LAB
ANION GAP SERPL CALCULATED.3IONS-SCNC: 10.3 MMOL/L (ref 5–15)
BACTERIA UR QL AUTO: ABNORMAL /HPF
BASOPHILS # BLD AUTO: 0.05 10*3/MM3 (ref 0–0.2)
BASOPHILS NFR BLD AUTO: 0.6 % (ref 0–1.5)
BILIRUB UR QL STRIP: NEGATIVE
BUN SERPL-MCNC: 33 MG/DL (ref 8–23)
BUN/CREAT SERPL: 19.8 (ref 7–25)
CALCIUM SPEC-SCNC: 9.2 MG/DL (ref 8.6–10.5)
CHLORIDE SERPL-SCNC: 104 MMOL/L (ref 98–107)
CLARITY UR: ABNORMAL
CO2 SERPL-SCNC: 24.7 MMOL/L (ref 22–29)
COLOR UR: YELLOW
CREAT SERPL-MCNC: 1.67 MG/DL (ref 0.57–1)
DEPRECATED RDW RBC AUTO: 42.8 FL (ref 37–54)
EGFRCR SERPLBLD CKD-EPI 2021: 29.9 ML/MIN/1.73
EOSINOPHIL # BLD AUTO: 0.33 10*3/MM3 (ref 0–0.4)
EOSINOPHIL NFR BLD AUTO: 4.2 % (ref 0.3–6.2)
ERYTHROCYTE [DISTWIDTH] IN BLOOD BY AUTOMATED COUNT: 12.3 % (ref 12.3–15.4)
GLUCOSE SERPL-MCNC: 182 MG/DL (ref 65–99)
GLUCOSE UR STRIP-MCNC: NEGATIVE MG/DL
HCT VFR BLD AUTO: 37.8 % (ref 34–46.6)
HGB BLD-MCNC: 12.3 G/DL (ref 12–15.9)
HGB UR QL STRIP.AUTO: ABNORMAL
HYALINE CASTS UR QL AUTO: ABNORMAL /LPF
IMM GRANULOCYTES # BLD AUTO: 0.02 10*3/MM3 (ref 0–0.05)
IMM GRANULOCYTES NFR BLD AUTO: 0.3 % (ref 0–0.5)
KETONES UR QL STRIP: NEGATIVE
LEUKOCYTE ESTERASE UR QL STRIP.AUTO: ABNORMAL
LYMPHOCYTES # BLD AUTO: 1.65 10*3/MM3 (ref 0.7–3.1)
LYMPHOCYTES NFR BLD AUTO: 21.2 % (ref 19.6–45.3)
MCH RBC QN AUTO: 30.5 PG (ref 26.6–33)
MCHC RBC AUTO-ENTMCNC: 32.5 G/DL (ref 31.5–35.7)
MCV RBC AUTO: 93.8 FL (ref 79–97)
MONOCYTES # BLD AUTO: 0.64 10*3/MM3 (ref 0.1–0.9)
MONOCYTES NFR BLD AUTO: 8.2 % (ref 5–12)
NEUTROPHILS NFR BLD AUTO: 5.1 10*3/MM3 (ref 1.7–7)
NEUTROPHILS NFR BLD AUTO: 65.5 % (ref 42.7–76)
NITRITE UR QL STRIP: POSITIVE
NRBC BLD AUTO-RTO: 0 /100 WBC (ref 0–0.2)
PH UR STRIP.AUTO: 5.5 [PH] (ref 5–8)
PHOSPHATE SERPL-MCNC: 3.4 MG/DL (ref 2.5–4.5)
PLATELET # BLD AUTO: 234 10*3/MM3 (ref 140–450)
PMV BLD AUTO: 11.2 FL (ref 6–12)
POTASSIUM SERPL-SCNC: 4.6 MMOL/L (ref 3.5–5.2)
PROT UR QL STRIP: ABNORMAL
PTH-INTACT SERPL-MCNC: 107 PG/ML (ref 15–65)
RBC # BLD AUTO: 4.03 10*6/MM3 (ref 3.77–5.28)
RBC # UR STRIP: ABNORMAL /HPF
REF LAB TEST METHOD: ABNORMAL
SODIUM SERPL-SCNC: 139 MMOL/L (ref 136–145)
SP GR UR STRIP: 1.02 (ref 1–1.03)
SQUAMOUS #/AREA URNS HPF: ABNORMAL /HPF
UROBILINOGEN UR QL STRIP: ABNORMAL
WBC # UR STRIP: ABNORMAL /HPF
WBC NRBC COR # BLD: 7.79 10*3/MM3 (ref 3.4–10.8)

## 2023-03-01 PROCEDURE — 85025 COMPLETE CBC W/AUTO DIFF WBC: CPT

## 2023-03-01 PROCEDURE — 87086 URINE CULTURE/COLONY COUNT: CPT

## 2023-03-01 PROCEDURE — 36415 COLL VENOUS BLD VENIPUNCTURE: CPT

## 2023-03-01 PROCEDURE — 80048 BASIC METABOLIC PNL TOTAL CA: CPT

## 2023-03-01 PROCEDURE — 81001 URINALYSIS AUTO W/SCOPE: CPT

## 2023-03-01 PROCEDURE — 84100 ASSAY OF PHOSPHORUS: CPT

## 2023-03-01 PROCEDURE — 83970 ASSAY OF PARATHORMONE: CPT

## 2023-03-01 PROCEDURE — 87077 CULTURE AEROBIC IDENTIFY: CPT

## 2023-03-01 PROCEDURE — 87186 SC STD MICRODIL/AGAR DIL: CPT

## 2023-03-03 LAB — BACTERIA SPEC AEROBE CULT: ABNORMAL

## 2023-03-08 ENCOUNTER — PATIENT MESSAGE (OUTPATIENT)
Dept: CARDIOLOGY | Facility: CLINIC | Age: 83
End: 2023-03-08
Payer: MEDICARE

## 2023-03-09 ENCOUNTER — TELEPHONE (OUTPATIENT)
Dept: CARDIOLOGY | Facility: CLINIC | Age: 83
End: 2023-03-09
Payer: MEDICARE

## 2023-03-09 NOTE — TELEPHONE ENCOUNTER
----- Message from Lynda Patterson sent at 3/8/2023  8:06 PM EST -----  Regarding: Lynda Patterson-Loop Recorder placement  Contact: [phone]  Jocelyne has an appointment on Tuesday march 14th for a loop recorder placement.  She is currently being treated for a UTI.  She will finish her course of abx on March 14th.  Can you ask dr liang if this is ok to proceed with the procedure?    Thank you,  Jocelyne

## 2023-03-13 RX ORDER — CEPHALEXIN 500 MG/1
500 CAPSULE ORAL 2 TIMES DAILY
COMMUNITY
End: 2023-03-16

## 2023-03-14 ENCOUNTER — HOSPITAL ENCOUNTER (OUTPATIENT)
Dept: CARDIOLOGY | Facility: HOSPITAL | Age: 83
Discharge: HOME | End: 2023-03-14
Admitting: INTERNAL MEDICINE
Payer: MEDICARE

## 2023-03-14 VITALS
DIASTOLIC BLOOD PRESSURE: 70 MMHG | WEIGHT: 172.84 LBS | HEART RATE: 64 BPM | RESPIRATION RATE: 22 BRPM | HEIGHT: 59 IN | TEMPERATURE: 97.6 F | BODY MASS INDEX: 34.84 KG/M2 | OXYGEN SATURATION: 98 % | SYSTOLIC BLOOD PRESSURE: 110 MMHG

## 2023-03-14 DIAGNOSIS — E66.9 OBESITY (BMI 30-39.9): ICD-10-CM

## 2023-03-14 DIAGNOSIS — I10 ESSENTIAL HYPERTENSION: ICD-10-CM

## 2023-03-14 DIAGNOSIS — E11.9 TYPE 2 DIABETES MELLITUS WITHOUT COMPLICATION, WITHOUT LONG-TERM CURRENT USE OF INSULIN: ICD-10-CM

## 2023-03-14 DIAGNOSIS — R55 RECURRENT SYNCOPE: ICD-10-CM

## 2023-03-14 DIAGNOSIS — E78.5 DYSLIPIDEMIA: ICD-10-CM

## 2023-03-14 DIAGNOSIS — R00.1 BRADYCARDIA: ICD-10-CM

## 2023-03-14 LAB
MAXIMAL PREDICTED HEART RATE: 135 BPM
STRESS TARGET HR: 115 BPM

## 2023-03-14 PROCEDURE — C1764 EVENT RECORDER, CARDIAC: HCPCS

## 2023-03-14 PROCEDURE — 96372 THER/PROPH/DIAG INJ SC/IM: CPT

## 2023-03-14 PROCEDURE — 33285 INSJ SUBQ CAR RHYTHM MNTR: CPT

## 2023-03-14 PROCEDURE — 33285 INSJ SUBQ CAR RHYTHM MNTR: CPT | Performed by: INTERNAL MEDICINE

## 2023-03-14 RX ORDER — LIDOCAINE HYDROCHLORIDE AND EPINEPHRINE BITARTRATE 20; .01 MG/ML; MG/ML
INJECTION, SOLUTION SUBCUTANEOUS
Status: COMPLETED | OUTPATIENT
Start: 2023-03-14 | End: 2023-03-14

## 2023-03-14 RX ORDER — MULTIPLE VITAMINS W/ MINERALS TAB 9MG-400MCG
1 TAB ORAL DAILY
COMMUNITY

## 2023-03-14 RX ADMIN — LIDOCAINE HYDROCHLORIDE,EPINEPHRINE BITARTRATE 10 ML: 20; .01 INJECTION, SOLUTION INFILTRATION; PERINEURAL at 16:50

## 2023-03-16 ENCOUNTER — OFFICE VISIT (OUTPATIENT)
Dept: FAMILY MEDICINE CLINIC | Facility: CLINIC | Age: 83
End: 2023-03-16
Payer: MEDICARE

## 2023-03-16 ENCOUNTER — LAB (OUTPATIENT)
Dept: FAMILY MEDICINE CLINIC | Facility: CLINIC | Age: 83
End: 2023-03-16
Payer: MEDICARE

## 2023-03-16 VITALS
HEART RATE: 60 BPM | HEIGHT: 59 IN | OXYGEN SATURATION: 97 % | WEIGHT: 172 LBS | DIASTOLIC BLOOD PRESSURE: 78 MMHG | BODY MASS INDEX: 34.68 KG/M2 | TEMPERATURE: 97.6 F | SYSTOLIC BLOOD PRESSURE: 136 MMHG

## 2023-03-16 DIAGNOSIS — E11.22 TYPE 2 DIABETES MELLITUS WITH STAGE 3 CHRONIC KIDNEY DISEASE, WITHOUT LONG-TERM CURRENT USE OF INSULIN, UNSPECIFIED WHETHER STAGE 3A OR 3B CKD: ICD-10-CM

## 2023-03-16 DIAGNOSIS — E78.2 MIXED HYPERLIPIDEMIA: Primary | ICD-10-CM

## 2023-03-16 DIAGNOSIS — F32.A DEPRESSIVE DISORDER: ICD-10-CM

## 2023-03-16 DIAGNOSIS — I95.1 ORTHOSTASIS: ICD-10-CM

## 2023-03-16 DIAGNOSIS — I10 ESSENTIAL (PRIMARY) HYPERTENSION: ICD-10-CM

## 2023-03-16 DIAGNOSIS — N18.30 TYPE 2 DIABETES MELLITUS WITH STAGE 3 CHRONIC KIDNEY DISEASE, WITHOUT LONG-TERM CURRENT USE OF INSULIN, UNSPECIFIED WHETHER STAGE 3A OR 3B CKD: ICD-10-CM

## 2023-03-16 DIAGNOSIS — N39.0 FREQUENT UTI: ICD-10-CM

## 2023-03-16 DIAGNOSIS — E78.2 MIXED HYPERLIPIDEMIA: ICD-10-CM

## 2023-03-16 DIAGNOSIS — R29.6 RECURRENT FALLS WHILE WALKING: ICD-10-CM

## 2023-03-16 DIAGNOSIS — D73.4 SPLENIC CYST: ICD-10-CM

## 2023-03-16 DIAGNOSIS — L30.9 ECZEMA, UNSPECIFIED TYPE: ICD-10-CM

## 2023-03-16 LAB
CHOLEST SERPL-MCNC: 199 MG/DL (ref 0–200)
HBA1C MFR BLD: 6.1 % (ref 4.8–5.6)
HDLC SERPL-MCNC: 61 MG/DL (ref 40–60)
LDLC SERPL CALC-MCNC: 121 MG/DL (ref 0–100)
LDLC/HDLC SERPL: 1.94 {RATIO}
TRIGL SERPL-MCNC: 97 MG/DL (ref 0–150)
VLDLC SERPL-MCNC: 17 MG/DL (ref 5–40)

## 2023-03-16 PROCEDURE — 3078F DIAST BP <80 MM HG: CPT | Performed by: FAMILY MEDICINE

## 2023-03-16 PROCEDURE — 80061 LIPID PANEL: CPT | Performed by: FAMILY MEDICINE

## 2023-03-16 PROCEDURE — 99214 OFFICE O/P EST MOD 30 MIN: CPT | Performed by: FAMILY MEDICINE

## 2023-03-16 PROCEDURE — 1160F RVW MEDS BY RX/DR IN RCRD: CPT | Performed by: FAMILY MEDICINE

## 2023-03-16 PROCEDURE — 83036 HEMOGLOBIN GLYCOSYLATED A1C: CPT | Performed by: FAMILY MEDICINE

## 2023-03-16 PROCEDURE — 36415 COLL VENOUS BLD VENIPUNCTURE: CPT

## 2023-03-16 PROCEDURE — 3075F SYST BP GE 130 - 139MM HG: CPT | Performed by: FAMILY MEDICINE

## 2023-03-16 PROCEDURE — 1159F MED LIST DOCD IN RCRD: CPT | Performed by: FAMILY MEDICINE

## 2023-03-16 RX ORDER — SERTRALINE HYDROCHLORIDE 100 MG/1
100 TABLET, FILM COATED ORAL NIGHTLY
COMMUNITY
End: 2023-03-16

## 2023-03-16 RX ORDER — ESTRADIOL 0.1 MG/G
2 CREAM VAGINAL 3 TIMES WEEKLY
COMMUNITY

## 2023-03-16 RX ORDER — MOMETASONE FUROATE 1 MG/G
1 CREAM TOPICAL DAILY
Qty: 15 G | Refills: 0 | Status: SHIPPED | OUTPATIENT
Start: 2023-03-16

## 2023-03-16 RX ORDER — SERTRALINE HYDROCHLORIDE 100 MG/1
150 TABLET, FILM COATED ORAL NIGHTLY
Status: SHIPPED | COMMUNITY
Start: 2023-03-16

## 2023-03-16 NOTE — PROGRESS NOTES
"Subjective   Lynda Patterson is a 83 y.o. female.     History of Present Illness  Here for follow-up after she was seen in December with orthostasis  She is also due for follow-up on her blood pressure, cholesterol, and diabetes     Lynda Patterson is an 83-year-old female who presents for a follow-up after she was seen in 12/2022 with orthostasis and is here today for follow-up on her blood pressure, cholesterol, and diabetes mellitus. She consents to the use of SHANIKA. She is accompanied by a family member, who contributes to her history and mentions that the patient is depressed.    She is still having pre-syncopal episodes. Her cardiologist has a loop recorder on her. She denies any chest pain. She is shorter of breath right before she experiences pre-syncope.    She is checking her blood glucose levels at home and they are running about 150 mg/dL. She does not know the last time she had her eyes checked, but she has an appointment scheduled.    She is using a four-pronged cane and does not want to use a walker. She admits to being \"sort of\" stubborn. The patient states she has 2 walkers at home.    She is being followed by urology for recurrent urinary tract infections. She has an appointment with Dr. Quiles today, 03/16/2023. Dr. Quiles ordered the last urine that was positive and another provider prescribed low dose Bactrim. When she called after her urinary test returned showing another infection, Dr. Quiles did not want her on Bactrim and prescribed 7 to 10 days of antibiotics. The adult female is going to ask Dr. Quiles today what is his choice for a long-term antibiotic. The patient has had recurrent urinary tract infections since 07/2022. She had a cystoscopy that indicated she had an infection and a small renal calculus; Dr. Quiles did not think the calculus was causing her recurrent UTIs. The patient does drink water.    The patient states she does not do anything for herself. She is " "thinking about suicide. She is not sure if she would actually do it. Her plan is to go into the garage and start the car. She tried to do it once before. She got over it. She states \"I just snapped out of it.\" She keeps thinking she will snap out of this one. She does not think she would call someone and talk to them and tell them how she is feeling right that minute because she is embarrassed. She feels like Zoloft is helping. Franci states she has been quite worried about Lynda. The patient has [place] 5 days a week, for at least 4 hours a day. When they are there, Lynda does not have to be right with her partner, and she states that helps. Franci and her  call the patient and her partner daily and see her on weekends. The patient promises that she will not hurt herself until she returns to our clinic in 2 weeks. She promises she will call Franci if she starts feeling like she wants to hurt herself. Franci agrees that if she gets that call, she will call me.    The following portions of the patient's history were reviewed and updated as appropriate: allergies, current medications, past family history, past medical history, past social history, past surgical history, and problem list.  Past Medical History:   Diagnosis Date   • Anxiety    • Depression    • DM2 (diabetes mellitus, type 2) ([facility])    • Gout    • Heart disease    • Hyperlipidemia    • Hypertension    • Kidney failure     Stage three kidney failure , abstraction from [facility]   • Macular degeneration    • Nondisplaced fracture of proximal end of left fibula 09/03/2021   • Sprain of left ankle, initial encounter 08/20/2021   • Syncope    • Urinary tract infection      Past Surgical History:   Procedure Laterality Date   • CHOLECYSTECTOMY  1988   • TOTAL ABDOMINAL HYSTERECTOMY  1970     Family History   Problem Relation Age of Onset   • Stroke Mother    • Stroke Father    • Heart disease Brother    • Heart attack Son    • Heart disease " "Son      Social History     Socioeconomic History   • Marital status:    Tobacco Use   • Smoking status: Never   • Smokeless tobacco: Never   Vaping Use   • Vaping Use: Never used   Substance and Sexual Activity   • Alcohol use: No   • Drug use: No   • Sexual activity: Defer         Current Outpatient Medications:   •  atorvastatin (LIPITOR) 20 MG tablet, TAKE 1/2 TABLET BY MOUTH EVERY NIGHT AT BEDTIME, Disp: 45 tablet, Rfl: 0  •  estradiol (ESTRACE) 0.1 MG/GM vaginal cream, Insert 2 g into the vagina 3 (Three) Times a Week. MWF, Disp: , Rfl:   •  gabapentin (NEURONTIN) 300 MG capsule, TAKE ONE CAPSULE BY MOUTH EVERY NIGHT AT BEDTIME, Disp: 90 capsule, Rfl: 1  •  metFORMIN (GLUCOPHAGE) 500 MG tablet, TAKE ONE TABLET BY MOUTH TWICE A DAY, Disp: 180 tablet, Rfl: 1  •  multivitamin with minerals tablet tablet, Take 1 tablet by mouth Daily., Disp: , Rfl:   •  ONE TOUCH LANCETS misc, ONETOUCH LANCETS, Disp: , Rfl:   •  OneTouch Ultra test strip, TEST BLOOD SUGAR ONCE DAILY, Disp: 100 each, Rfl: 1  •  sertraline (ZOLOFT) 100 MG tablet, Take 1.5 tablets by mouth Every Night., Disp: , Rfl:   •  vitamin D3 125 MCG (5000 UT) capsule capsule, Take 1 capsule by mouth Daily., Disp: , Rfl:   •  fluticasone (FLONASE) 50 MCG/ACT nasal spray, 2 sprays into the nostril(s) as directed by provider Daily for 30 days. 1 spray in each nostril daily, Disp: 16 g, Rfl: 0  •  mometasone (Elocon) 0.1 % cream, Apply 1 application topically to the appropriate area as directed Daily., Disp: 15 g, Rfl: 0    Review of Systems   Review of systems was performed, and pertinent findings are noted in the HPI.    /78 (BP Location: Left arm, Patient Position: Sitting, Cuff Size: Large Adult)   Pulse 60   Temp 97.6 °F (36.4 °C) (Temporal)   Ht 149.9 cm (59\")   Wt 78 kg (172 lb)   SpO2 97%   BMI 34.74 kg/m²       Objective   Physical Exam  Vitals and nursing note reviewed.   Constitutional:       Appearance: Normal appearance. She is " obese.   Cardiovascular:      Rate and Rhythm: Normal rate and regular rhythm.      Heart sounds: Normal heart sounds.   Pulmonary:      Effort: Pulmonary effort is normal.      Breath sounds: Normal breath sounds.   Musculoskeletal:      Cervical back: Neck supple.      Right lower leg: No edema.      Left lower leg: No edema.   Skin:     Comments: Dry scaly patches on shins   Neurological:      Mental Status: She is alert.       Lab Results   Component Value Date    GLUCOSE 182 (H) 03/01/2023    BUN 33 (H) 03/01/2023    CREATININE 1.67 (H) 03/01/2023    EGFR 29.9 (L) 03/01/2023    BCR 19.8 03/01/2023    K 4.6 03/01/2023    CO2 24.7 03/01/2023    CALCIUM 9.2 03/01/2023    ALBUMIN 4.30 06/23/2022    BILITOT 0.3 06/23/2022    AST 19 06/23/2022    ALT 8 06/23/2022         Assessment & Plan   Problems Addressed this Visit        Cardiac and Vasculature    Mixed hyperlipidemia - Primary    Relevant Orders    Lipid Panel    Essential (primary) hypertension    Relevant Orders    Lipid Panel       Endocrine and Metabolic    Type 2 diabetes mellitus with stage 3 chronic kidney disease, without long-term current use of insulin (HCC)    Relevant Orders    Lipid Panel    Hemoglobin A1c       Hematology and Neoplasia    Splenic cyst       Mental Health    Depressive disorder    Relevant Medications    sertraline (ZOLOFT) 100 MG tablet   Other Visit Diagnoses     Recurrent falls while walking        Orthostasis        Frequent UTI        Eczema, unspecified type        Relevant Medications    mometasone (Elocon) 0.1 % cream      Diagnoses       Codes Comments    Mixed hyperlipidemia    -  Primary ICD-10-CM: E78.2  ICD-9-CM: 272.2     Essential (primary) hypertension     ICD-10-CM: I10  ICD-9-CM: 401.9     Type 2 diabetes mellitus with stage 3 chronic kidney disease, without long-term current use of insulin, unspecified whether stage 3a or 3b CKD (HCC)     ICD-10-CM: E11.22, N18.30  ICD-9-CM: 250.40, 585.3     Depressive disorder      ICD-10-CM: F32.A  ICD-9-CM: 311     Splenic cyst     ICD-10-CM: D73.4  ICD-9-CM: 289.59     Recurrent falls while walking     ICD-10-CM: R29.6  ICD-9-CM: 781.99     Orthostasis     ICD-10-CM: I95.1  ICD-9-CM: 458.0     Frequent UTI     ICD-10-CM: N39.0  ICD-9-CM: 599.0     Eczema, unspecified type     ICD-10-CM: L30.9  ICD-9-CM: 692.9             1. Mixed hyperlipidemia  - Last CMP was reviewed. She is due for a lipid panel. She will continue her atorvastatin.    2. Hypertension  - She will continue off her current medications.    3. Type 2 diabetes mellitus  - She is due for a lipid panel and a HbA1c. She will continue her metformin. She was counseled on the need for dietary compliance.    4. Splenic cyst  - It is currently 2.6 cm in size. It was found on a CT done by urology. I have explained to the patient and her family that this does not need to be removed and is a benign finding.    5. Eczema  - I have given her a prescription for Simelacon cream to use.    6. Depressive disorder  - I have increased her Zoloft to 150 mg and I will see her back in 2 weeks. Patient contracted for safety and promises me that she will not harm herself. She will call me or call her family member and get back in here immediately or seek help.    7. Recurrent falls when walking  - She is currently using a 4 pronged cane. I have explained to her that she really needs to be using her walker.    8. Orthostasis  - Appears to have resolved.    9. Recurrent UTI  - She is still seeing urology and will continue to do so. They are looking at a medicine that she can be put on for prophylaxis.    Return in 2 weeks.    Transcribed from ambient dictation for Jenn Tirado MD by Keiry Chambers.  03/16/23   10:10 EDT    Patient or patient representative verbalized consent to the visit recording.  I have personally performed the services described in this document as transcribed by the above individual, and it is both accurate and  complete.

## 2023-03-24 ENCOUNTER — CLINICAL SUPPORT NO REQUIREMENTS (OUTPATIENT)
Dept: CARDIOLOGY | Facility: CLINIC | Age: 83
End: 2023-03-24
Payer: MEDICARE

## 2023-03-24 DIAGNOSIS — R55 SYNCOPE, UNSPECIFIED SYNCOPE TYPE: ICD-10-CM

## 2023-03-24 DIAGNOSIS — Z95.818 STATUS POST PLACEMENT OF IMPLANTABLE LOOP RECORDER: Primary | ICD-10-CM

## 2023-03-24 NOTE — PROGRESS NOTES
Patient is here today s/p loop recorder placement. Removed dressing and staples with no issues. Incision is well approximated, no redness, swelling or drainage noted. Patient is connected with remote monitor at this time. She has a follow up apt scheduled for August. Advised her to call if she has any issues and we can see her. NC   1-2 cups/cans per day

## 2023-03-30 RX ORDER — CEPHALEXIN 250 MG/1
CAPSULE ORAL
COMMUNITY
Start: 2023-03-16

## 2023-03-31 ENCOUNTER — OFFICE VISIT (OUTPATIENT)
Dept: FAMILY MEDICINE CLINIC | Facility: CLINIC | Age: 83
End: 2023-03-31
Payer: MEDICARE

## 2023-03-31 VITALS
OXYGEN SATURATION: 94 % | HEART RATE: 65 BPM | TEMPERATURE: 97.3 F | SYSTOLIC BLOOD PRESSURE: 111 MMHG | BODY MASS INDEX: 34.88 KG/M2 | WEIGHT: 173 LBS | DIASTOLIC BLOOD PRESSURE: 72 MMHG | HEIGHT: 59 IN

## 2023-03-31 DIAGNOSIS — M15.9 OSTEOARTHRITIS OF MULTIPLE JOINTS, UNSPECIFIED OSTEOARTHRITIS TYPE: ICD-10-CM

## 2023-03-31 DIAGNOSIS — F32.A DEPRESSIVE DISORDER: Primary | ICD-10-CM

## 2023-03-31 PROCEDURE — 99213 OFFICE O/P EST LOW 20 MIN: CPT | Performed by: FAMILY MEDICINE

## 2023-03-31 PROCEDURE — 3078F DIAST BP <80 MM HG: CPT | Performed by: FAMILY MEDICINE

## 2023-03-31 PROCEDURE — 3074F SYST BP LT 130 MM HG: CPT | Performed by: FAMILY MEDICINE

## 2023-04-13 ENCOUNTER — TELEPHONE (OUTPATIENT)
Dept: FAMILY MEDICINE CLINIC | Facility: CLINIC | Age: 83
End: 2023-04-13
Payer: MEDICARE

## 2023-04-13 NOTE — TELEPHONE ENCOUNTER
I received a blank DWO form from [email or web address] on pt. I spoke with patient and she says that it is an order for a Rolator.   Form on your desk.

## 2023-04-16 RX ORDER — ATORVASTATIN CALCIUM 20 MG/1
TABLET, FILM COATED ORAL
Qty: 45 TABLET | Refills: 0 | Status: SHIPPED | OUTPATIENT
Start: 2023-04-16

## 2023-04-17 PROCEDURE — 93298 REM INTERROG DEV EVAL SCRMS: CPT | Performed by: INTERNAL MEDICINE

## 2023-04-17 PROCEDURE — G2066 INTER DEVC REMOTE 30D: HCPCS | Performed by: INTERNAL MEDICINE

## 2023-05-14 RX ORDER — GABAPENTIN 300 MG/1
CAPSULE ORAL
Qty: 90 CAPSULE | Refills: 1 | Status: SHIPPED | OUTPATIENT
Start: 2023-05-14

## 2023-06-02 ENCOUNTER — OFFICE VISIT (OUTPATIENT)
Dept: FAMILY MEDICINE CLINIC | Facility: CLINIC | Age: 83
End: 2023-06-02

## 2023-06-02 VITALS
WEIGHT: 165.7 LBS | DIASTOLIC BLOOD PRESSURE: 64 MMHG | OXYGEN SATURATION: 97 % | BODY MASS INDEX: 33.4 KG/M2 | TEMPERATURE: 98.2 F | HEIGHT: 59 IN | HEART RATE: 75 BPM | SYSTOLIC BLOOD PRESSURE: 99 MMHG

## 2023-06-02 DIAGNOSIS — Z02.2 ENCOUNTER FOR EXAMINATION FOR ADMISSION TO ASSISTED LIVING FACILITY: Primary | ICD-10-CM

## 2023-06-02 NOTE — PROGRESS NOTES
Chief Complaint  office visit (Patient is moving into nursing home.)    Subjective        Lyndajaleel Patterson presents to [facility]  History of Present Illness  Ylnda is a 83 year old female presenting today for a assisted living evaluation. Her and her  are going to be residents at [facility]. She is having more difficulty taking care of herself and her . Patient had a recent fall in their garage and it wasn't until 2.5 hours later that her  found her. She needs some assistance with her ADLs. She is able to feed herself. She has limited ability to cook for herself. She is able to independently bathe and toilet herself. She uses a walker for ambulation due to episodes of blacking out. She has an implanted loop recorder, but per family, cardiology has been unable to pinpoint the exact cause of her blacking out. She wears glasses. Her daughter in law gets her medications together for her and she forgets to take her medications sometimes.             The following portions of the patient's history were reviewed and updated as appropriate: allergies, current medications, past family history, past medical history, past social history, past surgical history and problem list.    Allergies   Allergen Reactions   • Ciprofloxacin Nausea And Vomiting   • Penicillin G Nausea And Vomiting   • Sulfa Antibiotics Nausea And Vomiting       Patient Active Problem List   Diagnosis   • Mixed hyperlipidemia   • Macrocytic anemia   • Type 2 diabetes mellitus with stage 3 chronic kidney disease, without long-term current use of insulin   • Acute low back pain   • Anxiety disorder   • Carotid artery stenosis   • Chronic kidney disease, stage III (moderate)   • Depressive disorder   • Disorder associated with type 2 diabetes mellitus   • Encounter for general adult medical examination without abnormal findings   • Fatigue   • Gastroesophageal reflux  "disease   • Gout   • Hyperkalemia   • Hypertension   • Osteoarthritis   • Postherpetic neuralgia   • Shingles   • Urinary incontinence   • Vitamin D deficiency   • Low back pain   • Essential (primary) hypertension   • Type 2 diabetes mellitus with diabetic chronic kidney disease   • Degenerative drusen   • Hypermetropia   • Pseudophakia   • Sprain of left ankle, initial encounter   • Nondisplaced fracture of proximal end of left fibula   • Encounter for general adult medical examination with abnormal findings   • Obesity (BMI 30-39.9)   • Splenic cyst   • Status post placement of implantable loop recorder   • Syncope       Current Outpatient Medications   Medication Instructions   • atorvastatin (LIPITOR) 20 MG tablet TAKE 1/2 TABLET BY MOUTH EVERY NIGHT AT BEDTIME   • cephalexin (KEFLEX) 250 MG capsule Take 1 capsule (250 mg total) by mouth 1 (one) time each day   • Cholecalciferol 10 MCG (400 UNIT) capsule No dose, route, or frequency recorded.   • estradiol (ESTRACE) 2 g, Vaginal, 3 Times Weekly, MWF   • fluticasone (FLONASE) 50 MCG/ACT nasal spray 2 sprays, Nasal, Daily, 1 spray in each nostril daily   • gabapentin (NEURONTIN) 300 MG capsule TAKE ONE CAPSULE BY MOUTH EVERY NIGHT AT BEDTIME   • metFORMIN (GLUCOPHAGE) 500 MG tablet TAKE ONE TABLET BY MOUTH TWICE A DAY   • mometasone (Elocon) 0.1 % cream 1 application, Topical, Daily   • multivitamin with minerals tablet tablet 1 tablet, Oral, Daily   • ONE TOUCH LANCETS misc ONETOUCH LANCETS   • OneTouch Ultra test strip TEST BLOOD SUGAR ONCE DAILY   • sertraline (ZOLOFT) 150 mg, Oral, Nightly   • vitamin D3 5,000 Units, Oral, Daily          Objective   Vital Signs:  BP 99/64 (BP Location: Left arm, Patient Position: Sitting, Cuff Size: Adult)   Pulse 75   Temp 98.2 °F (36.8 °C) (Temporal)   Ht 149.9 cm (59\")   Wt 75.2 kg (165 lb 11.2 oz)   SpO2 97%   BMI 33.47 kg/m²   Estimated body mass index is 33.47 kg/m² as calculated from the following:    Height as " "of this encounter: 149.9 cm (59\").    Weight as of this encounter: 75.2 kg (165 lb 11.2 oz).             Review of Systems   Constitutional: Negative for activity change, appetite change, chills, fatigue, fever and unexpected weight change.   HENT: Negative for congestion, rhinorrhea, sneezing and sore throat.    Eyes: Negative for visual disturbance.   Respiratory: Negative for cough, shortness of breath and wheezing.    Cardiovascular: Negative for chest pain.   Gastrointestinal: Negative for abdominal pain, constipation, diarrhea, nausea and vomiting.   Genitourinary: Negative for difficulty urinating and dysuria.   Musculoskeletal: Negative for arthralgias, gait problem and myalgias.   Skin: Negative for color change, rash and wound.   Neurological: Positive for syncope and weakness. Negative for dizziness, light-headedness, numbness and headaches.   Psychiatric/Behavioral: Negative for self-injury, sleep disturbance and suicidal ideas. The patient is not nervous/anxious.         Physical Exam  Vitals reviewed.   Constitutional:       Appearance: Normal appearance.   Cardiovascular:      Rate and Rhythm: Normal rate and regular rhythm.      Heart sounds: Normal heart sounds.   Pulmonary:      Effort: Pulmonary effort is normal.      Breath sounds: Normal breath sounds.   Skin:     General: Skin is warm and dry.   Neurological:      Mental Status: She is alert and oriented to person, place, and time.   Psychiatric:         Mood and Affect: Mood normal.         Behavior: Behavior normal.         Thought Content: Thought content normal.         Judgment: Judgment normal.        Result Review :                   Assessment and Plan   Diagnoses and all orders for this visit:    1. Encounter for examination for admission to assisted living facility (Primary)  Comments:  It is my medical opinion that the patient would benefit from assisted living facility to help with her ADLs.               Follow Up   No follow-ups " on file.  Patient was given instructions and counseling regarding her condition or for health maintenance advice. Please see specific information pulled into the AVS if appropriate.

## 2023-06-05 ENCOUNTER — TELEPHONE (OUTPATIENT)
Dept: CARDIOLOGY | Facility: CLINIC | Age: 83
End: 2023-06-05
Payer: MEDICARE

## 2023-06-05 NOTE — TELEPHONE ENCOUNTER
PATIENT DAUGHTER IN LAW CALLED AND SAID DR ARMENTA PLACED A LOOP RECORDER IN A COUPLE OF MONTHS AGE. AND SINCE THEN SHE HAS BLACKED OUT TWICE AND THEY WHERE WONDERING IF IT WAS RECORDED  AND IF SO THEY WANT  TO KNOW THE RESULTS OR WHAT TO DO. OR IF DR ARMENTA WAS AWARE OF THIS WOULD LIKE A CALL BACK . PLEASE CALL DAUGHTER IN LAW FUENTES [phone]

## 2023-06-06 NOTE — TELEPHONE ENCOUNTER
Called and discussed with Franci-patient's Daughter in law.  No signs of bradycardia of asystole or pauses.

## 2023-06-08 ENCOUNTER — TELEPHONE (OUTPATIENT)
Dept: FAMILY MEDICINE CLINIC | Facility: CLINIC | Age: 83
End: 2023-06-08
Payer: MEDICARE

## 2023-06-08 NOTE — TELEPHONE ENCOUNTER
Caller: ANN    Relationship: Other    Best call back number: [phone]     What was the call regarding: ANN WITH [facility] IS REQUESTING MOST RECENT OFFICE NOTE FOR PATIENT ALONG WITH MEDICATION LIST.     PLEASE CALL TO DISCUSS IF NEEDED.     FAX NUMBER: [phone]

## 2023-08-31 PROCEDURE — G2066 INTER DEVC REMOTE 30D: HCPCS | Performed by: INTERNAL MEDICINE

## 2023-08-31 PROCEDURE — 93298 REM INTERROG DEV EVAL SCRMS: CPT | Performed by: INTERNAL MEDICINE

## 2023-09-14 ENCOUNTER — OFFICE VISIT (OUTPATIENT)
Dept: CARDIOLOGY | Facility: CLINIC | Age: 83
End: 2023-09-14
Payer: MEDICARE

## 2023-09-14 VITALS
HEART RATE: 60 BPM | SYSTOLIC BLOOD PRESSURE: 157 MMHG | HEIGHT: 59 IN | OXYGEN SATURATION: 99 % | DIASTOLIC BLOOD PRESSURE: 83 MMHG | BODY MASS INDEX: 34.88 KG/M2 | WEIGHT: 173 LBS

## 2023-09-14 DIAGNOSIS — I10 ESSENTIAL HYPERTENSION: ICD-10-CM

## 2023-09-14 DIAGNOSIS — R00.1 BRADYCARDIA: ICD-10-CM

## 2023-09-14 DIAGNOSIS — E78.5 DYSLIPIDEMIA: ICD-10-CM

## 2023-09-14 DIAGNOSIS — E66.9 OBESITY (BMI 30-39.9): ICD-10-CM

## 2023-09-14 DIAGNOSIS — Z95.818 STATUS POST PLACEMENT OF IMPLANTABLE LOOP RECORDER: ICD-10-CM

## 2023-09-14 DIAGNOSIS — R55 RECURRENT SYNCOPE: Primary | ICD-10-CM

## 2023-09-14 DIAGNOSIS — N18.30 STAGE 3 CHRONIC KIDNEY DISEASE, UNSPECIFIED WHETHER STAGE 3A OR 3B CKD: ICD-10-CM

## 2023-09-14 DIAGNOSIS — E11.9 TYPE 2 DIABETES MELLITUS WITHOUT COMPLICATION, WITHOUT LONG-TERM CURRENT USE OF INSULIN: ICD-10-CM

## 2023-09-14 RX ORDER — ATORVASTATIN CALCIUM 10 MG/1
TABLET, FILM COATED ORAL
COMMUNITY
Start: 2023-09-03

## 2023-09-14 NOTE — PROGRESS NOTES
Subjective:     Encounter Date:09/14/2023      Patient ID: Lynda Patterson is a 83 y.o. female.    Chief Complaint and history of present illness:     follow-up for recurrent syncope and bradycardia, hypertension, dyslipidemia, diabetes     History of Present Illness:        Ms. Lynda Patterson has PMH of     CAD, details of which are not available  Recurrent syncope, [employer] ILR 3/14/2023  Hypertension  Dyslipidemia  Type 2 diabetes  CKD  Cholecystectomy, hysterectomy  Non-smoker  Allergies/intolerance to Cipro, penicillin, sulfa  Positive family history of premature CAD in patient's son           Here for   follow-up.  Patient was seen for evaluation of recurrent syncope.  Patient has been noticing in the last 6 months has been having recurrent syncope with no aggravating or relieving factors.  Was found to be orthostatic lisinopril was stopped and continues to have recurrent syncope.  Had slow heart rates.  Denies any chest pain.  Has dyspnea on exertion.  Walks with a cane.  Reportedly fell on 8/22/2023.  Patient thinks she is new to [facility] and was rushing to go places and fell denies any loss of consciousness.  Patient says she has no known CAD that she can recall.     Patient's arterial blood pressure is 156/82, heart rate 60, O2 sat of 99% on room air.  Repeat blood pressure was 157/83..  BMI is over 30.  Patient's daughter states that her blood pressure is normally low.     Review of records reveal labs from 6/23/2022 reveal a BUN/creatinine of 43/8.6 and a glucose of 119.  Hemoglobin A1c of 5.8 improved from 6.4 on 4/28/2021.  Folate was low.  B12 was normal, TSH was normal.  Lipid profile with cholesterol 235, triglycerides 121, HDL 60, .     Labs from 12/15/22 revealed CBC normal, TSH normal.  Labs from 8/8/2023 reveal BMP with a creatinine of 1.7 follow-up with Dr. Reid.  TSH 4.05.  Hemoglobin 10.8.  A1c 5.8.  Repeat labs 8/23/2023 reveal creatinine 2.1.        Assessment:  :      Recurrent syncope  Bradycardia  Dyspnea on exertion  CAD  Hypertension  Dyslipidemia  Diabetes  CKD Dr. Reid  Obesity with BMI over 30  Positive family history of premature CAD     Recommendations / Plan:            Patient underwent Lexiscan Cardiolite reviewed/interpreted by me 8/12/2022 which revealed normal perfusion EF of 77%.  Patient is bradycardic is not on any drugs which can cause bradycardia and TSH is normal.  Most probably might end up requiring pacemaker.  Patient has a loop recorder we will try to coordinate symptoms with bradycardia before considering pacemaker.  Advised follow-up with nephrology      Procedures Lexiscan Cardiolite performed 8/12/2022 reviewed/interpreted by me reveals normal perfusion EF of 77%              ECG 12 Lead    Date/Time: 9/14/2023 4:41 PM  Performed by: Deric Johnson MD  Authorized by: Deric Johnson MD   Comparison: compared with previous ECG from 6/23/2022  Comparison to previous ECG: EKG done today reviewed/elevated by me reveals sinus rhythm with rate of 61 bpm with Q waves in V1 V2 and low QRS voltage, no new change compared EKG from 6/23/2022        Copied text in this portion of the note has been reviewed and is accurate as of 9/21/2023  The following portions of the patient's history were reviewed and updated as appropriate: allergies, current medications, past family history, past medical history, past social history, past surgical history and problem list.    Assessment:         MDM       Diagnosis Plan   1. Recurrent syncope        2. Status post placement of implantable loop recorder        3. Bradycardia        4. Essential hypertension        5. Dyslipidemia        6. Obesity (BMI 30-39.9)        7. Type 2 diabetes mellitus without complication, without long-term current use of insulin        8. Stage 3 chronic kidney disease, unspecified whether stage 3a or 3b CKD               Plan:               Past Medical History:  Past  Medical History:   Diagnosis Date    Anxiety     Depression     DM2 (diabetes mellitus, type 2)     Gout     Heart disease     Hyperlipidemia     Hypertension     Kidney failure     Stage three kidney failure , 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    Macular degeneration     Nondisplaced fracture of proximal end of left fibula 09/03/2021    Sprain of left ankle, initial encounter 08/20/2021    Syncope     Urinary tract infection      Past Surgical History:  Past Surgical History:   Procedure Laterality Date    CHOLECYSTECTOMY  1988    TOTAL ABDOMINAL HYSTERECTOMY  1970      Allergies:  Allergies   Allergen Reactions    Ciprofloxacin Nausea And Vomiting    Penicillin G Nausea And Vomiting    Sulfa Antibiotics Nausea And Vomiting     Home Meds:  Current Meds:     Current Outpatient Medications:     atorvastatin (LIPITOR) 10 MG tablet, , Disp: , Rfl:     cephalexin (KEFLEX) 250 MG capsule, Take 1 capsule (250 mg total) by mouth 1 (one) time each day, Disp: , Rfl:     estradiol (ESTRACE) 0.1 MG/GM vaginal cream, Insert 2 g into the vagina 3 (Three) Times a Week. [facility], Disp: , Rfl:     gabapentin (NEURONTIN) 300 MG capsule, TAKE ONE CAPSULE BY MOUTH EVERY NIGHT AT BEDTIME, Disp: 90 capsule, Rfl: 1    mometasone (ELOCON) 0.1 % cream, APPLY TO AFFECTED AREA(S) DAILY, Disp: 15 g, Rfl: 0    ONE TOUCH LANCETS misc, ONETOUCH LANCETS, Disp: , Rfl:     OneTouch Ultra test strip, TEST BLOOD SUGAR ONCE DAILY, Disp: 100 each, Rfl: 1    sertraline (ZOLOFT) 100 MG tablet, Take 1.5 tablets by mouth Every Night., Disp: , Rfl:     fluticasone (FLONASE) 50 MCG/ACT nasal spray, 2 sprays into the nostril(s) as directed by provider Daily for 30 days. 1 spray in each nostril daily, Disp: 16 g, Rfl: 0  Social History:   Social History     Tobacco Use    Smoking status: Never    Smokeless tobacco: Never   Substance Use Topics    Alcohol use: No      Family History:  Family History   Problem Relation Age of Onset    Stroke Mother     Stroke Father      "Heart disease Brother     Heart attack Son     Heart disease Son               Review of Systems   Cardiovascular:  Positive for leg swelling. Negative for chest pain and palpitations.   Respiratory:  Negative for shortness of breath.    Neurological:  Positive for dizziness. Negative for numbness.   All other systems are negative         Objective:     Physical Exam  /83 (BP Location: Left arm, Patient Position: Sitting, Cuff Size: Large Adult)   Pulse 60   Ht 149.9 cm (59\")   Wt 78.5 kg (173 lb)   SpO2 99%   BMI 34.94 kg/m²   General:  Appears in no acute distress  Eyes: Sclera is anicteric,  conjunctiva is clear   HEENT:  No JVD.  No carotid bruits  Respiratory: Respirations regular and unlabored at rest.  Clear to auscultation  Cardiovascular: S1,S2 Regular rate and rhythm. No murmur, rub or gallop auscultated.   Extremities: No digital clubbing or cyanosis, no edema  Skin: Color pink. Skin warm and dry to touch. No rashes  No xanthoma  Neuro: Alert and awake.    Lab Reviewed:         Deric Johnson MD  9/21/2023 16:45 EDT      EMR Dragon/Transcription:   \"Dictated utilizing Dragon dictation\".        "

## 2023-10-01 PROCEDURE — 93298 REM INTERROG DEV EVAL SCRMS: CPT | Performed by: INTERNAL MEDICINE

## 2023-10-01 PROCEDURE — G2066 INTER DEVC REMOTE 30D: HCPCS | Performed by: INTERNAL MEDICINE

## 2024-07-29 NOTE — PROGRESS NOTES
ORTHOPEDIC VISIT    Referring Provider: No ref. provider found  Primary Care Provider: Jenn Tirado MD         Subjective:  Chief Complaint:  Chief Complaint   Patient presents with   • Left Fibula - Initial Evaluation     Fell two weeks ago, 8/6/2021, states leg just went out from under her and fell. Seen at [facility] on 8/17/2021       HPI:  Lynda Patterson is a 81 y.o. female who presents for her initial visit for left lower leg pain after a fall on August 6.  She reports pain in the ankle and over the lateral aspect of the proximal lower leg as well.  She was seen in the urgent care on August 17, where she had x-rays of her ankle and tib-fib that were negative.  She was placed in a tall Cam walking boot, which she has been in since then.  She has been using Tylenol for pain.  She does report that she is feeling better at this time.    Past Medical History:   Diagnosis Date   • Anxiety    • Depression    • DM2 (diabetes mellitus, type 2) (CMS/[facility])    • Gout    • Heart disease    • Hyperlipidemia    • Hypertension    • Kidney failure     Stage three kidney failure , abstraction from [facility]   • Macular degeneration        Past Surgical History:   Procedure Laterality Date   • CHOLECYSTECTOMY  1988   • TOTAL ABDOMINAL HYSTERECTOMY  1970       History reviewed. No pertinent family history.    Social History     Occupational History   • Not on file   Tobacco Use   • Smoking status: Never Smoker   • Smokeless tobacco: Never Used   Vaping Use   • Vaping Use: Never used   Substance and Sexual Activity   • Alcohol use: No   • Drug use: No   • Sexual activity: Defer        Medications:    Current Outpatient Medications:   •  atorvastatin (LIPITOR) 20 MG tablet, Take 0.5 tablets by mouth every night at bedtime., Disp: 90 tablet, Rfl: 3  •  B Complex Vitamins (VITAMIN B COMPLEX) tablet, Take 1 tablet by mouth Daily., Disp: , Rfl:   •  fluticasone (FLONASE) 50 MCG/ACT nasal spray, 2 sprays into the  nostril(s) as directed by provider Daily for 30 days. 1 spray in each nostril daily, Disp: 16 g, Rfl: 0  •  gabapentin (NEURONTIN) 300 MG capsule, TAKE ONE CAPSULE BY MOUTH EVERY NIGHT AT BEDTIME FOR NEUROPATHY, Disp: 90 capsule, Rfl: 0  •  glimepiride (AMARYL) 2 MG tablet, TAKE ONE TABLET BY MOUTH TWICE A DAY WITH MEALS, Disp: 180 tablet, Rfl: 1  •  glucose blood (ONE TOUCH ULTRA TEST) test strip, Test BS once a day E11.22 one touch ultra, Disp: 100 each, Rfl: 1  •  lisinopril (PRINIVIL,ZESTRIL) 10 MG tablet, Take 1 tablet by mouth Daily., Disp: , Rfl:   •  Lutein 20 MG tablet, LUTEIN 20 MG TABS, Disp: , Rfl:   •  metFORMIN (GLUCOPHAGE) 500 MG tablet, Take 1 tablet by mouth 2 (Two) Times a Day., Disp: , Rfl:   •  Multiple Vitamins-Minerals (ICAPS) tablet, ICAPS AREDS FORMULA TABS, Disp: , Rfl:   •  nitrofurantoin, macrocrystal-monohydrate, (Macrobid) 100 MG capsule, Take 1 capsule by mouth 2 (Two) Times a Day., Disp: 14 capsule, Rfl: 0  •  ONE TOUCH LANCETS misc, ONETOUCH LANCETS, Disp: , Rfl:   •  oxybutynin XL (Ditropan XL) 10 MG 24 hr tablet, Take 1 tablet by mouth Daily., Disp: 90 tablet, Rfl: 3  •  sertraline (ZOLOFT) 100 MG tablet, TAKE ONE TABLET BY MOUTH DAILY FOR DEPRESSION, Disp: 90 tablet, Rfl: 1    Allergies:  Allergies   Allergen Reactions   • Ciprofloxacin Nausea And Vomiting   • Penicillin G Nausea And Vomiting   • Sulfa Antibiotics Nausea And Vomiting         Review of Systems:  Gen -no fever, chills , sweats, headache   Eyes - no irritation or discharge   ENT -  no ear pain , runny nose , sore throat , difficulty swallowing   Resp - no cough , congestion , excessive expectoration   CVS - no chest pain , palpitations.   Abd - no pain , nausea , vomiting , diarrhea   Skin - no rash , lesions.   Neuro - no dizziness    Please see HPI for any other pertinent positives.  All other systems were reviewed and are negative.       Objective   Objective:    /69 (BP Location: Left arm, Patient Position:  "Sitting, Cuff Size: Large Adult)   Pulse 65   Ht 157.5 cm (62\")   Wt 83.9 kg (185 lb)   BMI 33.84 kg/m²     Physical Examination:  Alert, oriented, obese individual in no acute distress, ambulating with the assistance of a walker  Left lower extremity shows no erythema, rashes, or open skin lesions. There is a mild amount of swelling in the lower leg, especially about the ankle and proximal fibula. It is grossly well aligned, and the patient is neurovascularly intact distally.  There is mild tenderness to palpation over the proximal fibula and generally about the ankle.  Ankle plantar and dorsiflexion is intact.           Imaging:  XR ANKLE 3+ VW LEFT-     Date of Exam: 8/17/2021 6:38 PM     Indication: fall, pain and swelling; H65.191-Other acute nonsuppurative  otitis media, right ear.     Comparison: None available.     Technique: Three views of the ankle were obtained.     FINDINGS:  No evidence of fracture. No evidence of dislocation. No  erosions. No periostitis. No focal osseous lesions. Diffuse soft tissue  swelling is present, most pronounced along the lateral malleolus. Mild  degenerative changes are present.     IMPRESSION:  No acute osseous abnormality. Diffuse soft tissue swelling is present,  most pronounced along the lateral malleolus.     Electronically Signed By-Magdalena Florence MD On:8/17/2021 6:53 PM  This report was finalized on [id number] by  Magdalena Florence MD.    XR TIBIA FIBULA 2 VW LEFT-     Date of Exam: 8/17/2021 6:37 PM     Indication: fall, pain; H65.191-Other acute nonsuppurative otitis media,  right ear.     Comparison: None available.     Technique: Two views of the tibia and fibula were obtained.     FINDINGS:  No evidence of fracture. No evidence of dislocation. No  erosions. No periostitis. No focal osseous lesions. No focal soft tissue  abnormalities identified.     IMPRESSION:  No acute osseous abnormality.     Electronically Signed By-Magdalena Florence MD On:8/17/2021 6:53 PM  This " report was finalized on [id number] by  Magdalena Florence MD.    I did personally reviewed the above images and no fractures or dislocations were appreciated.          Assessment:  1. Sprain of left ankle, initial encounter    2. Fall from slip, trip, or stumble, initial encounter                 Plan:  At this time, I am recommending that she continue the Cam walking boot.  She may be weightbearing as tolerated.  She should continue her Tylenol as needed for pain.  Her granddaughter is concerned about DVT, as her grandmother is not getting as much activity since being in the boot.  Okay to start 81 mg aspirin daily while in the boot.  I will plan to see her back in 2 weeks for recheck.  She should call with any questions or concerns.             INESSA Yo  08/20/21  10:04 EDT    EMR Dragon/Transcription disclaimer:  Much of this encounter note is an electronic transcription/translation of spoken language to printed text. The electronic translation of spoken language may permit erroneous, or at times, nonsensical words or phrases to be inadvertently transcribed; Although I have reviewed the note for such errors, some may still exist.   Yes

## 2025-01-26 PROCEDURE — 93298 REM INTERROG DEV EVAL SCRMS: CPT | Performed by: INTERNAL MEDICINE

## 2025-03-11 ENCOUNTER — APPOINTMENT (OUTPATIENT)
Dept: MRI IMAGING | Facility: HOSPITAL | Age: 85
End: 2025-03-11
Payer: MEDICARE

## 2025-03-11 ENCOUNTER — HOSPITAL ENCOUNTER (INPATIENT)
Facility: HOSPITAL | Age: 85
LOS: 8 days | Discharge: NURSING, REHAB OR LONG-TERM CARE | End: 2025-03-19
Attending: EMERGENCY MEDICINE | Admitting: INTERNAL MEDICINE
Payer: MEDICARE

## 2025-03-11 ENCOUNTER — APPOINTMENT (OUTPATIENT)
Dept: GENERAL RADIOLOGY | Facility: HOSPITAL | Age: 85
End: 2025-03-11
Payer: MEDICARE

## 2025-03-11 ENCOUNTER — APPOINTMENT (OUTPATIENT)
Dept: CT IMAGING | Facility: HOSPITAL | Age: 85
End: 2025-03-11
Payer: MEDICARE

## 2025-03-11 DIAGNOSIS — I63.9 ACUTE CVA (CEREBROVASCULAR ACCIDENT): Primary | ICD-10-CM

## 2025-03-11 LAB
ABO GROUP BLD: NORMAL
ALBUMIN SERPL-MCNC: 3.8 G/DL (ref 3.5–5.2)
ALBUMIN/GLOB SERPL: 1.1 G/DL
ALP SERPL-CCNC: 60 U/L (ref 39–117)
ALT SERPL W P-5'-P-CCNC: 27 U/L (ref 1–33)
ANION GAP SERPL CALCULATED.3IONS-SCNC: 16.2 MMOL/L (ref 5–15)
APTT PPP: 29.1 SECONDS (ref 22.7–35.4)
AST SERPL-CCNC: 40 U/L (ref 1–32)
BASOPHILS # BLD AUTO: 0.02 10*3/MM3 (ref 0–0.2)
BASOPHILS NFR BLD AUTO: 0.2 % (ref 0–1.5)
BILIRUB SERPL-MCNC: 0.4 MG/DL (ref 0–1.2)
BLD GP AB SCN SERPL QL: NEGATIVE
BUN SERPL-MCNC: 50 MG/DL (ref 8–23)
BUN/CREAT SERPL: 28.1 (ref 7–25)
CALCIUM SPEC-SCNC: 9.1 MG/DL (ref 8.6–10.5)
CHLORIDE SERPL-SCNC: 108 MMOL/L (ref 98–107)
CHOLEST SERPL-MCNC: 166 MG/DL (ref 0–200)
CO2 SERPL-SCNC: 19.8 MMOL/L (ref 22–29)
CREAT SERPL-MCNC: 1.78 MG/DL (ref 0.57–1)
DEPRECATED RDW RBC AUTO: 49.1 FL (ref 37–54)
EGFRCR SERPLBLD CKD-EPI 2021: 27.4 ML/MIN/1.73
EOSINOPHIL # BLD AUTO: 0 10*3/MM3 (ref 0–0.4)
EOSINOPHIL NFR BLD AUTO: 0 % (ref 0.3–6.2)
ERYTHROCYTE [DISTWIDTH] IN BLOOD BY AUTOMATED COUNT: 13.2 % (ref 12.3–15.4)
FOLATE SERPL-MCNC: 5.15 NG/ML (ref 4.78–24.2)
GLOBULIN UR ELPH-MCNC: 3.4 GM/DL
GLUCOSE BLDC GLUCOMTR-MCNC: 142 MG/DL (ref 70–105)
GLUCOSE BLDC GLUCOMTR-MCNC: 200 MG/DL (ref 70–105)
GLUCOSE BLDC GLUCOMTR-MCNC: 67 MG/DL (ref 70–105)
GLUCOSE SERPL-MCNC: 230 MG/DL (ref 65–99)
HBA1C MFR BLD: 6.08 % (ref 4.8–5.6)
HCT VFR BLD AUTO: 44.8 % (ref 34–46.6)
HDLC SERPL-MCNC: 54 MG/DL (ref 40–60)
HGB BLD-MCNC: 14 G/DL (ref 12–15.9)
HOLD SPECIMEN: NORMAL
HOLD SPECIMEN: NORMAL
IMM GRANULOCYTES # BLD AUTO: 0.04 10*3/MM3 (ref 0–0.05)
IMM GRANULOCYTES NFR BLD AUTO: 0.3 % (ref 0–0.5)
INR PPP: 1.16 (ref 0.9–1.1)
LDLC SERPL CALC-MCNC: 94 MG/DL (ref 0–100)
LDLC/HDLC SERPL: 1.7 {RATIO}
LYMPHOCYTES # BLD AUTO: 1.12 10*3/MM3 (ref 0.7–3.1)
LYMPHOCYTES NFR BLD AUTO: 9.3 % (ref 19.6–45.3)
MCH RBC QN AUTO: 30.8 PG (ref 26.6–33)
MCHC RBC AUTO-ENTMCNC: 31.3 G/DL (ref 31.5–35.7)
MCV RBC AUTO: 98.7 FL (ref 79–97)
MONOCYTES # BLD AUTO: 1.23 10*3/MM3 (ref 0.1–0.9)
MONOCYTES NFR BLD AUTO: 10.2 % (ref 5–12)
NEUTROPHILS NFR BLD AUTO: 80 % (ref 42.7–76)
NEUTROPHILS NFR BLD AUTO: 9.68 10*3/MM3 (ref 1.7–7)
NRBC BLD AUTO-RTO: 0 /100 WBC (ref 0–0.2)
PLATELET # BLD AUTO: 221 10*3/MM3 (ref 140–450)
PMV BLD AUTO: 10.4 FL (ref 6–12)
POTASSIUM SERPL-SCNC: 3.9 MMOL/L (ref 3.5–5.2)
PROT SERPL-MCNC: 7.2 G/DL (ref 6–8.5)
PROTHROMBIN TIME: 14.8 SECONDS (ref 11.7–14.2)
RBC # BLD AUTO: 4.54 10*6/MM3 (ref 3.77–5.28)
RH BLD: NEGATIVE
SODIUM SERPL-SCNC: 144 MMOL/L (ref 136–145)
T&S EXPIRATION DATE: NORMAL
T4 FREE SERPL-MCNC: 1 NG/DL (ref 0.93–1.7)
TRIGL SERPL-MCNC: 100 MG/DL (ref 0–150)
TSH SERPL DL<=0.05 MIU/L-ACNC: 3.19 UIU/ML (ref 0.27–4.2)
VIT B12 BLD-MCNC: 399 PG/ML (ref 211–946)
VLDLC SERPL-MCNC: 18 MG/DL (ref 5–40)
WBC NRBC COR # BLD AUTO: 12.09 10*3/MM3 (ref 3.4–10.8)
WHOLE BLOOD HOLD COAG: NORMAL
WHOLE BLOOD HOLD SPECIMEN: NORMAL

## 2025-03-11 PROCEDURE — 25810000003 SODIUM CHLORIDE 0.9 % SOLUTION: Performed by: PSYCHIATRY & NEUROLOGY

## 2025-03-11 PROCEDURE — 82948 REAGENT STRIP/BLOOD GLUCOSE: CPT

## 2025-03-11 PROCEDURE — 70551 MRI BRAIN STEM W/O DYE: CPT

## 2025-03-11 PROCEDURE — 86850 RBC ANTIBODY SCREEN: CPT | Performed by: EMERGENCY MEDICINE

## 2025-03-11 PROCEDURE — 82746 ASSAY OF FOLIC ACID SERUM: CPT | Performed by: PSYCHIATRY & NEUROLOGY

## 2025-03-11 PROCEDURE — 3E03317 INTRODUCTION OF OTHER THROMBOLYTIC INTO PERIPHERAL VEIN, PERCUTANEOUS APPROACH: ICD-10-PCS | Performed by: PSYCHIATRY & NEUROLOGY

## 2025-03-11 PROCEDURE — 83036 HEMOGLOBIN GLYCOSYLATED A1C: CPT | Performed by: PSYCHIATRY & NEUROLOGY

## 2025-03-11 PROCEDURE — 70450 CT HEAD/BRAIN W/O DYE: CPT

## 2025-03-11 PROCEDURE — 70496 CT ANGIOGRAPHY HEAD: CPT

## 2025-03-11 PROCEDURE — 87481 CANDIDA DNA AMP PROBE: CPT

## 2025-03-11 PROCEDURE — 85730 THROMBOPLASTIN TIME PARTIAL: CPT | Performed by: EMERGENCY MEDICINE

## 2025-03-11 PROCEDURE — 84443 ASSAY THYROID STIM HORMONE: CPT | Performed by: PSYCHIATRY & NEUROLOGY

## 2025-03-11 PROCEDURE — 82607 VITAMIN B-12: CPT | Performed by: PSYCHIATRY & NEUROLOGY

## 2025-03-11 PROCEDURE — 25510000001 IOPAMIDOL PER 1 ML: Performed by: EMERGENCY MEDICINE

## 2025-03-11 PROCEDURE — 25010000002 TENECTEPLASE PER 50 MG

## 2025-03-11 PROCEDURE — 86900 BLOOD TYPING SEROLOGIC ABO: CPT

## 2025-03-11 PROCEDURE — 70498 CT ANGIOGRAPHY NECK: CPT

## 2025-03-11 PROCEDURE — 99222 1ST HOSP IP/OBS MODERATE 55: CPT | Performed by: PSYCHIATRY & NEUROLOGY

## 2025-03-11 PROCEDURE — 87641 MR-STAPH DNA AMP PROBE: CPT

## 2025-03-11 PROCEDURE — 85610 PROTHROMBIN TIME: CPT | Performed by: EMERGENCY MEDICINE

## 2025-03-11 PROCEDURE — 84439 ASSAY OF FREE THYROXINE: CPT | Performed by: PSYCHIATRY & NEUROLOGY

## 2025-03-11 PROCEDURE — 85025 COMPLETE CBC W/AUTO DIFF WBC: CPT | Performed by: EMERGENCY MEDICINE

## 2025-03-11 PROCEDURE — 93005 ELECTROCARDIOGRAM TRACING: CPT | Performed by: EMERGENCY MEDICINE

## 2025-03-11 PROCEDURE — 71045 X-RAY EXAM CHEST 1 VIEW: CPT

## 2025-03-11 PROCEDURE — 0042T HC CT CEREBRAL PERFUSION W/WO CONTRAST: CPT

## 2025-03-11 PROCEDURE — 99291 CRITICAL CARE FIRST HOUR: CPT

## 2025-03-11 PROCEDURE — 86900 BLOOD TYPING SEROLOGIC ABO: CPT | Performed by: EMERGENCY MEDICINE

## 2025-03-11 PROCEDURE — 80053 COMPREHEN METABOLIC PANEL: CPT | Performed by: EMERGENCY MEDICINE

## 2025-03-11 PROCEDURE — 86901 BLOOD TYPING SEROLOGIC RH(D): CPT | Performed by: EMERGENCY MEDICINE

## 2025-03-11 PROCEDURE — 86901 BLOOD TYPING SEROLOGIC RH(D): CPT

## 2025-03-11 PROCEDURE — 80061 LIPID PANEL: CPT | Performed by: PSYCHIATRY & NEUROLOGY

## 2025-03-11 RX ORDER — NITROGLYCERIN 0.4 MG/1
0.4 TABLET SUBLINGUAL
Status: DISCONTINUED | OUTPATIENT
Start: 2025-03-11 | End: 2025-03-19 | Stop reason: HOSPADM

## 2025-03-11 RX ORDER — IOPAMIDOL 755 MG/ML
50 INJECTION, SOLUTION INTRAVASCULAR
Status: COMPLETED | OUTPATIENT
Start: 2025-03-11 | End: 2025-03-11

## 2025-03-11 RX ORDER — IBUPROFEN 600 MG/1
1 TABLET ORAL
Status: DISCONTINUED | OUTPATIENT
Start: 2025-03-11 | End: 2025-03-19 | Stop reason: HOSPADM

## 2025-03-11 RX ORDER — ERGOCALCIFEROL 1.25 MG/1
50000 CAPSULE, LIQUID FILLED ORAL
COMMUNITY
End: 2025-03-19 | Stop reason: HOSPADM

## 2025-03-11 RX ORDER — ASPIRIN 325 MG
325 TABLET ORAL DAILY
Status: DISCONTINUED | OUTPATIENT
Start: 2025-03-12 | End: 2025-03-19 | Stop reason: HOSPADM

## 2025-03-11 RX ORDER — ONDANSETRON 2 MG/ML
4 INJECTION INTRAMUSCULAR; INTRAVENOUS EVERY 6 HOURS PRN
Status: DISCONTINUED | OUTPATIENT
Start: 2025-03-11 | End: 2025-03-11

## 2025-03-11 RX ORDER — MIDODRINE HYDROCHLORIDE 5 MG/1
5 TABLET ORAL 2 TIMES DAILY PRN
COMMUNITY

## 2025-03-11 RX ORDER — SERTRALINE HYDROCHLORIDE 25 MG/1
25 TABLET, FILM COATED ORAL DAILY
COMMUNITY

## 2025-03-11 RX ORDER — ALUMINA, MAGNESIA, AND SIMETHICONE 2400; 2400; 240 MG/30ML; MG/30ML; MG/30ML
15 SUSPENSION ORAL EVERY 6 HOURS PRN
Status: DISCONTINUED | OUTPATIENT
Start: 2025-03-11 | End: 2025-03-19 | Stop reason: HOSPADM

## 2025-03-11 RX ORDER — SODIUM CHLORIDE 0.9 % (FLUSH) 0.9 %
10 SYRINGE (ML) INJECTION AS NEEDED
Status: DISCONTINUED | OUTPATIENT
Start: 2025-03-11 | End: 2025-03-19 | Stop reason: HOSPADM

## 2025-03-11 RX ORDER — SODIUM CHLORIDE 0.9 % (FLUSH) 0.9 %
10 SYRINGE (ML) INJECTION EVERY 12 HOURS SCHEDULED
Status: DISCONTINUED | OUTPATIENT
Start: 2025-03-11 | End: 2025-03-19 | Stop reason: HOSPADM

## 2025-03-11 RX ORDER — LOPERAMIDE HYDROCHLORIDE 2 MG/1
2 CAPSULE ORAL EVERY 6 HOURS PRN
COMMUNITY

## 2025-03-11 RX ORDER — DEXTROSE MONOHYDRATE 25 G/50ML
25 INJECTION, SOLUTION INTRAVENOUS
Status: DISCONTINUED | OUTPATIENT
Start: 2025-03-11 | End: 2025-03-19 | Stop reason: HOSPADM

## 2025-03-11 RX ORDER — ACETAMINOPHEN 325 MG/1
650 TABLET ORAL EVERY 6 HOURS PRN
COMMUNITY

## 2025-03-11 RX ORDER — ASPIRIN 300 MG/1
300 SUPPOSITORY RECTAL DAILY
Status: DISCONTINUED | OUTPATIENT
Start: 2025-03-12 | End: 2025-03-19 | Stop reason: HOSPADM

## 2025-03-11 RX ORDER — HYDRALAZINE HYDROCHLORIDE 20 MG/ML
10 INJECTION INTRAMUSCULAR; INTRAVENOUS EVERY 4 HOURS PRN
Status: DISCONTINUED | OUTPATIENT
Start: 2025-03-11 | End: 2025-03-19 | Stop reason: HOSPADM

## 2025-03-11 RX ORDER — NICOTINE POLACRILEX 4 MG
15 LOZENGE BUCCAL
Status: DISCONTINUED | OUTPATIENT
Start: 2025-03-11 | End: 2025-03-19 | Stop reason: HOSPADM

## 2025-03-11 RX ORDER — POLYETHYLENE GLYCOL 3350 17 G/17G
17 POWDER, FOR SOLUTION ORAL DAILY PRN
Status: DISCONTINUED | OUTPATIENT
Start: 2025-03-11 | End: 2025-03-19 | Stop reason: HOSPADM

## 2025-03-11 RX ORDER — ONDANSETRON 4 MG/1
4 TABLET, ORALLY DISINTEGRATING ORAL EVERY 6 HOURS PRN
Status: DISCONTINUED | OUTPATIENT
Start: 2025-03-11 | End: 2025-03-11

## 2025-03-11 RX ORDER — SODIUM CHLORIDE 9 MG/ML
40 INJECTION, SOLUTION INTRAVENOUS AS NEEDED
Status: DISCONTINUED | OUTPATIENT
Start: 2025-03-11 | End: 2025-03-19 | Stop reason: HOSPADM

## 2025-03-11 RX ORDER — GUAIFENESIN 200 MG/10ML
200 LIQUID ORAL EVERY 6 HOURS PRN
COMMUNITY
End: 2025-03-19 | Stop reason: HOSPADM

## 2025-03-11 RX ORDER — BUPROPION HYDROCHLORIDE 150 MG/1
150 TABLET ORAL DAILY
COMMUNITY

## 2025-03-11 RX ORDER — AMOXICILLIN 250 MG
2 CAPSULE ORAL 2 TIMES DAILY
Status: DISCONTINUED | OUTPATIENT
Start: 2025-03-11 | End: 2025-03-19 | Stop reason: HOSPADM

## 2025-03-11 RX ORDER — BISACODYL 10 MG
10 SUPPOSITORY, RECTAL RECTAL DAILY PRN
Status: DISCONTINUED | OUTPATIENT
Start: 2025-03-11 | End: 2025-03-19 | Stop reason: HOSPADM

## 2025-03-11 RX ORDER — SODIUM CHLORIDE 0.9 % (FLUSH) 0.9 %
10 SYRINGE (ML) INJECTION ONCE
Status: COMPLETED | OUTPATIENT
Start: 2025-03-11 | End: 2025-03-11

## 2025-03-11 RX ORDER — FERROUS SULFATE 325(65) MG
325 TABLET ORAL DAILY
COMMUNITY
End: 2025-03-19 | Stop reason: HOSPADM

## 2025-03-11 RX ORDER — TRAMADOL HYDROCHLORIDE 50 MG/1
50 TABLET ORAL 2 TIMES DAILY PRN
COMMUNITY
End: 2025-03-19 | Stop reason: HOSPADM

## 2025-03-11 RX ORDER — INSULIN LISPRO 100 [IU]/ML
2-7 INJECTION, SOLUTION INTRAVENOUS; SUBCUTANEOUS EVERY 6 HOURS SCHEDULED
Status: DISCONTINUED | OUTPATIENT
Start: 2025-03-11 | End: 2025-03-13

## 2025-03-11 RX ORDER — ATORVASTATIN CALCIUM 10 MG/1
10 TABLET, FILM COATED ORAL DAILY
COMMUNITY
End: 2025-03-19 | Stop reason: HOSPADM

## 2025-03-11 RX ORDER — BISACODYL 5 MG/1
5 TABLET, DELAYED RELEASE ORAL DAILY PRN
Status: DISCONTINUED | OUTPATIENT
Start: 2025-03-11 | End: 2025-03-19 | Stop reason: HOSPADM

## 2025-03-11 RX ORDER — IOPAMIDOL 755 MG/ML
100 INJECTION, SOLUTION INTRAVASCULAR
Status: COMPLETED | OUTPATIENT
Start: 2025-03-11 | End: 2025-03-11

## 2025-03-11 RX ORDER — ATORVASTATIN CALCIUM 40 MG/1
80 TABLET, FILM COATED ORAL NIGHTLY
Status: DISCONTINUED | OUTPATIENT
Start: 2025-03-11 | End: 2025-03-19 | Stop reason: HOSPADM

## 2025-03-11 RX ORDER — PROCHLORPERAZINE EDISYLATE 5 MG/ML
5 INJECTION INTRAMUSCULAR; INTRAVENOUS EVERY 6 HOURS PRN
Status: DISCONTINUED | OUTPATIENT
Start: 2025-03-11 | End: 2025-03-19 | Stop reason: HOSPADM

## 2025-03-11 RX ORDER — BUSPIRONE HYDROCHLORIDE 10 MG/1
10 TABLET ORAL 3 TIMES DAILY
COMMUNITY

## 2025-03-11 RX ADMIN — IOPAMIDOL 50 ML: 755 INJECTION, SOLUTION INTRAVENOUS at 15:42

## 2025-03-11 RX ADMIN — Medication 17 MG: at 15:39

## 2025-03-11 RX ADMIN — Medication 10 ML: at 22:41

## 2025-03-11 RX ADMIN — Medication 10 ML: at 15:51

## 2025-03-11 RX ADMIN — Medication 10 ML: at 15:39

## 2025-03-11 RX ADMIN — SODIUM CHLORIDE 1000 ML: 9 INJECTION, SOLUTION INTRAVENOUS at 15:35

## 2025-03-11 RX ADMIN — DEXTROSE MONOHYDRATE 25 G: 25 INJECTION, SOLUTION INTRAVENOUS at 22:41

## 2025-03-11 RX ADMIN — IOPAMIDOL 100 ML: 755 INJECTION, SOLUTION INTRAVENOUS at 15:43

## 2025-03-11 RX ADMIN — MUPIROCIN 1 APPLICATION: 20 OINTMENT TOPICAL at 22:41

## 2025-03-11 NOTE — ED PROVIDER NOTES
Subjective   History of Present Illness  Below is from EMS    History of present illness a 85-year-old female who has multiple health problems who was last known normal was about 12:30 PM she had just had been with the family that she was found on the floor in the room shortly afterwards blood sugar was low per the staff she was given IM glucagon EMS was called upon arrival the patient's blood sugar was 170 with a transport to the hospital hemodynamically stable and route.  Patient is unable to speak and give me any history.  They noticed right-sided paralysis and unable to speak.      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    Macular degeneration     Nondisplaced fracture of proximal end of left fibula 09/03/2021    Sprain of left ankle, initial encounter 08/20/2021    Syncope     Urinary tract infection        Allergies   Allergen Reactions    Ciprofloxacin Nausea And Vomiting    Penicillin G Nausea And Vomiting    Sulfa Antibiotics Nausea And Vomiting       Past Surgical History:   Procedure Laterality Date    CHOLECYSTECTOMY  1988    TOTAL ABDOMINAL HYSTERECTOMY  1970       Family History   Problem Relation Age of Onset    Stroke Mother     Stroke Father     Heart disease Brother     Heart attack Son     Heart disease Son        Social History     Socioeconomic History    Marital status:    Tobacco Use    Smoking status: Never    Smokeless tobacco: Never   Vaping Use    Vaping status: Never Used   Substance and Sexual Activity    Alcohol use: No    Drug use: No    Sexual activity: Defer     Prior to Admission medications    Medication Sig Start Date End Date Taking? Authorizing Provider   atorvastatin (LIPITOR) 10 MG tablet Take 1 tablet  by mouth Daily.   Yes Yahir Hsieh MD   buPROPion XL (WELLBUTRIN XL) 150 MG 24 hr tablet Take 1 tablet by mouth Daily.   Yes Yahir Hsieh MD   busPIRone (BUSPAR) 10 MG tablet Take 1 tablet by mouth 3 (Three) Times a Day.   Yes Yahir Hsieh MD   cephalexin (KEFLEX) 250 MG capsule Take 1 capsule by mouth Daily.   Yes Yahir Hsieh MD   ferrous sulfate 325 (65 FE) MG tablet Take 1 tablet by mouth Daily.   Yes Yahir Hsieh MD   Hydrocortisone (Hermelinda's magic butt cream) Apply 1 Application topically to the appropriate area as directed 4 (Four) Times a Day As Needed.   Yes Yahir Hsieh MD   sertraline (ZOLOFT) 25 MG tablet Take 1 tablet by mouth Daily.   Yes Yahir Hsieh MD   acetaminophen (TYLENOL) 325 MG tablet Take 2 tablets by mouth Every 6 (Six) Hours As Needed for Mild Pain.    Yahir Hsieh MD   bismuth subsalicylate (PEPTO BISMOL) 262 MG/15ML suspension Take 15 mL by mouth Daily As Needed for Indigestion.    Yahir Hsieh MD   camphor-menthol (SARNA) 0.5-0.5 % lotion Apply 1 Application topically to the appropriate area as directed 2 (Two) Times a Day As Needed for Itching.    Yahir Hsieh MD   guaifenesin (ROBITUSSIN) 100 MG/5ML liquid Take 10 mL by mouth Every 6 (Six) Hours As Needed for Cough.    Yahir Hsieh MD   loperamide (IMODIUM) 2 MG capsule Take 1 capsule by mouth Every 6 (Six) Hours As Needed for Diarrhea.    Yahir Hsieh MD   midodrine (PROAMATINE) 5 MG tablet Take 1 tablet by mouth 2 (Two) Times a Day As Needed.    Yahir Hsieh MD   traMADol (ULTRAM) 50 MG tablet Take 1 tablet by mouth 2 (Two) Times a Day As Needed for Moderate Pain.    Yahir Hsieh MD   vitamin D (ERGOCALCIFEROL) 1.25 MG (31902 UT) capsule capsule Take 1 capsule by mouth Every 30 (Thirty) Days. First Wednesday of month    Yahir Hsieh MD   atorvastatin (LIPITOR) 10 MG tablet  9/3/23 3/11/25  Jori  MD Yahir   cephalexin (KEFLEX) 250 MG capsule Take 1 capsule (250 mg total) by mouth 1 (one) time each day 3/16/23 3/11/25  Yahir Hsieh MD   estradiol (ESTRACE) 0.1 MG/GM vaginal cream Insert 2 g into the vagina 3 (Three) Times a Week. MWF  3/11/25  Yahir Hsieh MD   fluticasone (FLONASE) 50 MCG/ACT nasal spray 2 sprays into the nostril(s) as directed by provider Daily for 30 days. 1 spray in each nostril daily 8/17/21 3/11/25  Hiral Up PA-C   gabapentin (NEURONTIN) 300 MG capsule TAKE ONE CAPSULE BY MOUTH EVERY NIGHT AT BEDTIME 5/14/23 3/11/25  Cabrera Tirado MD   mometasone (ELOCON) 0.1 % cream APPLY TO AFFECTED AREA(S) DAILY 7/10/23 3/11/25  Jenn Tirado MD   ONE TOUCH LANCETS misc ONETOUCH LANCETS 6/6/12 3/11/25  Yahir Hsieh MD   OneTouch Ultra test strip TEST BLOOD SUGAR ONCE DAILY 9/13/21 3/11/25  Jenn Tirado MD   sertraline (ZOLOFT) 100 MG tablet Take 1.5 tablets by mouth Every Night. 3/16/23 3/11/25  Jenn Tirado MD          Objective   Physical Exam  Constitutional is an 85-year-old female resting comfortably with forced deviation to the left.  Vital signs reviewed.  HEENT pupils equal round reactive to will not look to the right.  Mouth was clear.  Neck supple no adenopathy no JV no bruits lungs were clear no retraction heart was regular without murmur rub abdomen soft nontender good bowel sounds no peritoneal findings or pulsatile mass extremities pulses equal upper lower extremities no edema no cords no Homans' sign no evidence of DVT skin is warm and dry without rashes or cellulitic changes neurologic patient is drowsy needs repeated stimulation she is unable to answer question a month and a she does not follow commands she has forced deviation to the left she has complete hemianopsia complete right-sided facial paralysis no effort against gravity with the right arm no effort with the left leg no effort  with the right leg sensory loss to the right patient is mute with global aphasia severe dysarthria profound Lucian intention NIH of 29  Procedures           ED Course      Results for orders placed or performed during the hospital encounter of 03/11/25   POC Glucose Once    Collection Time: 03/11/25  3:21 PM    Specimen: Blood   Result Value Ref Range    Glucose 200 (H) 70 - 105 mg/dL   Comprehensive Metabolic Panel    Collection Time: 03/11/25  3:22 PM    Specimen: Blood   Result Value Ref Range    Glucose 230 (H) 65 - 99 mg/dL    BUN 50 (H) 8 - 23 mg/dL    Creatinine 1.78 (H) 0.57 - 1.00 mg/dL    Sodium 144 136 - 145 mmol/L    Potassium 3.9 3.5 - 5.2 mmol/L    Chloride 108 (H) 98 - 107 mmol/L    CO2 19.8 (L) 22.0 - 29.0 mmol/L    Calcium 9.1 8.6 - 10.5 mg/dL    Total Protein 7.2 6.0 - 8.5 g/dL    Albumin 3.8 3.5 - 5.2 g/dL    ALT (SGPT) 27 1 - 33 U/L    AST (SGOT) 40 (H) 1 - 32 U/L    Alkaline Phosphatase 60 39 - 117 U/L    Total Bilirubin 0.4 0.0 - 1.2 mg/dL    Globulin 3.4 gm/dL    A/G Ratio 1.1 g/dL    BUN/Creatinine Ratio 28.1 (H) 7.0 - 25.0    Anion Gap 16.2 (H) 5.0 - 15.0 mmol/L    eGFR 27.4 (L) >60.0 mL/min/1.73   Protime-INR    Collection Time: 03/11/25  3:22 PM    Specimen: Blood   Result Value Ref Range    Protime 14.8 (H) 11.7 - 14.2 Seconds    INR 1.16 (H) 0.90 - 1.10   aPTT    Collection Time: 03/11/25  3:22 PM    Specimen: Blood   Result Value Ref Range    PTT 29.1 22.7 - 35.4 seconds   Vitamin B12    Collection Time: 03/11/25  3:22 PM    Specimen: Blood   Result Value Ref Range    Vitamin B-12 399 211 - 946 pg/mL   Folate    Collection Time: 03/11/25  3:22 PM    Specimen: Blood   Result Value Ref Range    Folate 5.15 4.78 - 24.20 ng/mL   Hemoglobin A1c    Collection Time: 03/11/25  3:22 PM    Specimen: Blood   Result Value Ref Range    Hemoglobin A1C 6.08 (H) 4.80 - 5.60 %   Lipid Panel    Collection Time: 03/11/25  3:22 PM    Specimen: Blood   Result Value Ref Range    Total Cholesterol 166 0 - 200  mg/dL    Triglycerides 100 0 - 150 mg/dL    HDL Cholesterol 54 40 - 60 mg/dL    LDL Cholesterol  94 0 - 100 mg/dL    VLDL Cholesterol 18 5 - 40 mg/dL    LDL/HDL Ratio 1.70    TSH    Collection Time: 03/11/25  3:22 PM    Specimen: Blood   Result Value Ref Range    TSH 3.190 0.270 - 4.200 uIU/mL   T4, Free    Collection Time: 03/11/25  3:22 PM    Specimen: Blood   Result Value Ref Range    Free T4 1.00 0.93 - 1.70 ng/dL   CBC Auto Differential    Collection Time: 03/11/25  3:22 PM    Specimen: Blood   Result Value Ref Range    WBC 12.09 (H) 3.40 - 10.80 10*3/mm3    RBC 4.54 3.77 - 5.28 10*6/mm3    Hemoglobin 14.0 12.0 - 15.9 g/dL    Hematocrit 44.8 34.0 - 46.6 %    MCV 98.7 (H) 79.0 - 97.0 fL    MCH 30.8 26.6 - 33.0 pg    MCHC 31.3 (L) 31.5 - 35.7 g/dL    RDW 13.2 12.3 - 15.4 %    RDW-SD 49.1 37.0 - 54.0 fl    MPV 10.4 6.0 - 12.0 fL    Platelets 221 140 - 450 10*3/mm3    Neutrophil % 80.0 (H) 42.7 - 76.0 %    Lymphocyte % 9.3 (L) 19.6 - 45.3 %    Monocyte % 10.2 5.0 - 12.0 %    Eosinophil % 0.0 (L) 0.3 - 6.2 %    Basophil % 0.2 0.0 - 1.5 %    Immature Grans % 0.3 0.0 - 0.5 %    Neutrophils, Absolute 9.68 (H) 1.70 - 7.00 10*3/mm3    Lymphocytes, Absolute 1.12 0.70 - 3.10 10*3/mm3    Monocytes, Absolute 1.23 (H) 0.10 - 0.90 10*3/mm3    Eosinophils, Absolute 0.00 0.00 - 0.40 10*3/mm3    Basophils, Absolute 0.02 0.00 - 0.20 10*3/mm3    Immature Grans, Absolute 0.04 0.00 - 0.05 10*3/mm3    nRBC 0.0 0.0 - 0.2 /100 WBC   Green Top (Gel)    Collection Time: 03/11/25  3:22 PM   Result Value Ref Range    Extra Tube Hold for add-ons.    Lavender Top    Collection Time: 03/11/25  3:22 PM   Result Value Ref Range    Extra Tube hold for add-on    Gold Top - SST    Collection Time: 03/11/25  3:22 PM   Result Value Ref Range    Extra Tube Hold for add-ons.    Light Blue Top    Collection Time: 03/11/25  3:22 PM   Result Value Ref Range    Extra Tube Hold for add-ons.    Type & Screen    Collection Time: 03/11/25  3:58 PM    Specimen:  Blood   Result Value Ref Range    ABO Type A     RH type Negative     Antibody Screen Negative     T&S Expiration Date 3/14/2025 11:59:59 PM    ECG 12 Lead Stroke Evaluation    Collection Time: 03/11/25  4:14 PM   Result Value Ref Range    QT Interval 460 ms    QTC Interval 543 ms   POC Glucose Once    Collection Time: 03/11/25 10:38 PM    Specimen: Blood   Result Value Ref Range    Glucose 67 (L) 70 - 105 mg/dL     XR Chest 1 View  Result Date: 3/11/2025  Impression: Loop recorder projects over the left lower chest. No radiographic evidence of an acute cardiopulmonary abnormality. Electronically Signed: Liang Key  3/11/2025 4:37 PM EDT  Workstation ID: [id number]    CT Angiogram Head w AI Analysis of LVO  Result Date: 3/11/2025  Impression: 1. No evidence of hemodynamically significant stenosis of the carotid or vertebral arteries. 2. Areas of irregularity with beaded appearance involving the distal bilateral internal carotid arteries and distal V2 segment of the left vertebral artery suggesting changes of fibromuscular dysplasia. 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 4. Occlusion of the the anterior M2 branch of the left middle cerebral artery. This would correspond to the perfusion abnormality. Electronically Signed: Bhaskar Kimble MD  3/11/2025 4:31 PM EDT  Workstation ID: [id number]    CT Angiogram Neck  Result Date: 3/11/2025  Impression: 1. No evidence of hemodynamically significant stenosis of the carotid or vertebral arteries. 2. Areas of irregularity with beaded appearance involving the distal bilateral internal carotid arteries and distal V2 segment of the left vertebral artery suggesting changes of fibromuscular dysplasia. 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 4. Occlusion of the the anterior M2 branch of the left middle cerebral artery. This would correspond to the perfusion abnormality. Electronically Signed: Bhaskar Kimble MD  3/11/2025 4:31 PM EDT  Workstation ID: [id number]    CT CEREBRAL  PERFUSION WITH & WITHOUT CONTRAST  Result Date: 3/11/2025  1.motion-degraded study with true area of Tmax rater than 6 seconds involving the left MCA distribution. Precise volume is not quantifiable due to motion artifact. Within this region there is a 7 mL volume of cerebral blood flow less than 30% compatible with a component of core infarct. Corresponding anterior M2 occlusion is noted on CTA. These findings were discussed with Dr. Cruz in the emergency department at 3:51 p.m. on 3/11/2025 Electronically Signed: Bhaskar Kimble MD  3/11/2025 3:51 PM EDT  Workstation ID: [id number]    CT Head Without Contrast Stroke Protocol  Result Date: 3/11/2025  Impression: 1. No intracranial hemorrhage. 2. Generalized cerebral atrophy with moderate white matter findings of chronic microvascular disease. 3. Left mastoid effusion. Electronically Signed: Hal Cee MD  3/11/2025 3:37 PM EDT  Workstation ID: [id number]    Medications   sodium chloride 0.9 % flush 10 mL (has no administration in time range)   sodium chloride 0.9 % flush 10 mL (10 mL Intravenous Given 3/11/25 2241)   sodium chloride 0.9 % flush 10 mL (has no administration in time range)   sodium chloride 0.9 % infusion 40 mL (has no administration in time range)   aspirin tablet 325 mg (has no administration in time range)     Or   aspirin suppository 300 mg (has no administration in time range)   atorvastatin (LIPITOR) tablet 80 mg (80 mg Oral Not Given 3/11/25 2106)   nitroglycerin (NITROSTAT) SL tablet 0.4 mg (has no administration in time range)   sodium chloride 0.9 % flush 10 mL (10 mL Intravenous Given 3/11/25 2241)   sodium chloride 0.9 % flush 10 mL (has no administration in time range)   sodium chloride 0.9 % infusion 40 mL (has no administration in time range)   mupirocin (BACTROBAN) 2 % nasal ointment 1 Application (1 Application Each Nare Given 3/11/25 2241)   aluminum-magnesium hydroxide-simethicone (MAALOX MAX) 400-400-40 MG/5ML suspension 15 mL (has  no administration in time range)   sennosides-docusate (PERICOLACE) 8.6-50 MG per tablet 2 tablet (2 tablets Oral Not Given 3/11/25 2106)     And   polyethylene glycol (MIRALAX) packet 17 g (has no administration in time range)     And   bisacodyl (DULCOLAX) EC tablet 5 mg (has no administration in time range)     And   bisacodyl (DULCOLAX) suppository 10 mg (has no administration in time range)   Potassium Replacement - Follow Nurse / BPA Driven Protocol (has no administration in time range)   Magnesium Low Dose Replacement - Follow Nurse / BPA Driven Protocol (has no administration in time range)   Phosphorus Replacement - Follow Nurse / BPA Driven Protocol (has no administration in time range)   Calcium Replacement - Follow Nurse / BPA Driven Protocol (has no administration in time range)   hydrALAZINE (APRESOLINE) injection 10 mg (has no administration in time range)   prochlorperazine (COMPAZINE) injection 5 mg (has no administration in time range)   dextrose (GLUTOSE) oral gel 15 g (has no administration in time range)   dextrose (D50W) (25 g/50 mL) IV injection 25 g (25 g Intravenous Given 3/11/25 2241)   glucagon (GLUCAGEN) injection 1 mg (has no administration in time range)   insulin lispro (HUMALOG/ADMELOG) injection 2-7 Units ( Subcutaneous Not Given 3/11/25 2247)   sodium chloride 0.9 % flush 10 mL (10 mL Intravenous Given 3/11/25 1539)     Followed by   tenecteplase for stroke (TNKASE) injection 17 mg (17 mg Intravenous Given 3/11/25 1539)     Followed by   sodium chloride 0.9 % flush 10 mL (10 mL Intravenous Given 3/11/25 1551)   iopamidol (ISOVUE-370) 76 % injection 50 mL (50 mL Intravenous Given 3/11/25 1542)   iopamidol (ISOVUE-370) 76 % injection 100 mL (100 mL Intravenous Given 3/11/25 1543)   sodium chloride 0.9 % bolus 1,000 mL (0 mL Intravenous Stopped 3/11/25 1920)       EKG my interpretation normal sinus rhythm rate of 84 normal axis no hypertrophy QTc of 543 T wave version diffusely  abnormal EKG with nothing old to compare with                          Total (NIH Stroke Scale): 18                      Medical Decision Making  Medical decision making.  Patient was seen on EMS stretcher code stroke was initiated patient went directly to CT scan and the case discussed with neurologist on-call for stroke neurology Dr. Perez and he saw the patient in CT scan.  CT scan head without my independent review no tumors masses hemorrhage acute findings radiology review without acute findings and this was discussed with the radiologist as well the patient family who was talked to by the stroke neurologist on-call he talked to him about the findings and consented for TNK and please see his note for that.  And she was given TN K.  The patient underwent a CT perfusion and a CT angiogram head neck.  Radiology report reviewed occlusion of the anterior M2 branch of the left middle cerebral artery.  This consistent with the findings on the abnormal perfusion scan.  These reports were reviewed by me.  Again today stroke neurologist on-call had discussed with the family about intervention but they declined because she is overall his poor health and did not want her to undergo any surgical procedures.  On repeat examination she was doing better she was now starting to look more to the right she was now starting to have some resistance with gravity in the right arm she had pretty good move the left leg and left arm now and had improvement with the right leg is well NIH is now about 19.  Still not speaking.  I do not see any evidence cyst intracerebral hemorrhage I do not see any evidence that suggest meningitis or encephalitis or sepsis or bacteremia based on my history and physical clinical findings here in the ER.  Complete list of all possibilities.  Patient's initial blood sugar at the nursing home was reported as low they gave her glucagon EMS this was 173 and when she got here it was 200.  Patient will be in  the ICU I talked to the ICU nurse practitioner Case discussed.  Stable otherwise improved ER course critical care 35 minutes.  Patient received thrombolytic therapy.    Problems Addressed:  Acute CVA (cerebrovascular accident): complicated acute illness or injury    Amount and/or Complexity of Data Reviewed  Labs: ordered. Decision-making details documented in ED Course.  Radiology: ordered and independent interpretation performed. Decision-making details documented in ED Course.  ECG/medicine tests: ordered.    Risk  Drug therapy requiring intensive monitoring for toxicity.  Decision regarding hospitalization.        Final diagnoses:   Acute CVA (cerebrovascular accident)       ED Disposition  ED Disposition       ED Disposition   Decision to Admit    Condition   --    Comment   Level of Care: Critical Care [6]   Diagnosis: CVA (cerebral vascular accident) [345360]   Admitting Physician: EHSAN FISHER [[id number]]   Certification: I Certify That Inpatient Hospital Services Are Medically Necessary For Greater Than 2 Midnights                 No follow-up provider specified.       Medication List        ASK your doctor about these medications      atorvastatin 10 MG tablet  Commonly known as: LIPITOR  Ask about: Which instructions should I use?     cephalexin 250 MG capsule  Commonly known as: KEFLEX  Ask about: Which instructions should I use?     sertraline 25 MG tablet  Commonly known as: ZOLOFT  Ask about: Which instructions should I use?                 Vincent Sanchez MD  03/11/25 [id number]

## 2025-03-11 NOTE — Clinical Note
Level of Care: Critical Care [6]   Admitting Physician: EHSAN FISHER [[id number]]   Attending Physician: EHSAN FISHER [[id number]]

## 2025-03-11 NOTE — ED NOTES
Nursing report ED to floor  Lynda Patterson  85 y.o.  female    HPI:   Chief Complaint   Patient presents with    Weakness - Generalized       Admitting doctor:   Cheryl Major MD    Admitting diagnosis:   The encounter diagnosis was Acute CVA (cerebrovascular accident).    Code status:   Current Code Status       Date Active Code Status Order ID Comments User Context       Not on file            Allergies:   Ciprofloxacin, Penicillin g, and Sulfa antibiotics    Isolation:  No active isolations     Fall Risk:  Fall Risk Assessment was completed, and patient is at high risk for falls.   Predictive Model Details         30 (Low) Factor Value    Calculated 3/11/2025 19:33 Age 85    Risk of Fall Model [place] Coma Scale 10     Active Peripheral IV Present     Imaging order in this encounter Present     Magnesium not on file     Number of Distinct Medication Classes administered 3     Creatinine 1.78 mg/dL     Diastolic BP 70     Terrell Scale not on file     Albumin 3.8 g/dL     Chloride 108 mmol/L     Calcium 9.1 mg/dL     ALT 27 U/L     Duration of Current Encounter 0.176 days     Total Bilirubin 0.4 mg/dL     Potassium 3.9 mmol/L     Respiratory Rate 15         Weight:       03/11/25  1519   Weight: 66.5 kg (146 lb 9.7 oz)       Intake and Output  No intake or output data in the 24 hours ending 03/11/25 1935    Diet:   Dietary Orders (From admission, onward)       Start     Ordered    03/11/25 1524  NPO Diet NPO Type: Strict NPO  (Acute (Onset < 24 hrs) Stroke Order Panel - COR / LAURY / LAG / WILFRIDO / MAD / PAD / NORAH / FSED)  Diet Effective Now        Question:  NPO Type  Answer:  Strict NPO    03/11/25 1523                     Most recent vitals:   Vitals:    03/11/25 1809 03/11/25 1839 03/11/25 1909 03/11/25 1932   BP: 127/58 135/59 139/70 122/60   Pulse: 67 64 65 64   Resp: 17 18 15    Temp:       TempSrc:       SpO2: 94% 94% 96% 93%   Weight:       Height:           Active LDAs/IV Access:   Lines, Drains &  Airways       Active LDAs       Name Placement date Placement time Site Days    Peripheral IV 03/11/25 1521 Anterior;Right;Upper Arm 03/11/25  1521  Arm  less than 1    Peripheral IV 03/11/25 1522 Left Antecubital 03/11/25  1522  Antecubital  less than 1                    Skin Condition:   Skin Assessments (last day)       Date/Time Interval    03/11/25 1525 24 hrs post onset of symptoms +/- 20 mins    03/11/25 1540 24 hrs post onset of symptoms +/- 20 mins             Labs (abnormal labs have a star):   Labs Reviewed   COMPREHENSIVE METABOLIC PANEL - Abnormal; Notable for the following components:       Result Value    Glucose 230 (*)     BUN 50 (*)     Creatinine 1.78 (*)     Chloride 108 (*)     CO2 19.8 (*)     AST (SGOT) 40 (*)     BUN/Creatinine Ratio 28.1 (*)     Anion Gap 16.2 (*)     eGFR 27.4 (*)     All other components within normal limits    Narrative:     GFR Categories in Chronic Kidney Disease (CKD)      GFR Category          GFR (mL/min/1.73)    Interpretation  G1                     90 or greater         Normal or high (1)  G2                      60-89                Mild decrease (1)  G3a                   45-59                Mild to moderate decrease  G3b                   30-44                Moderate to severe decrease  G4                    15-29                Severe decrease  G5                    14 or less           Kidney failure          (1)In the absence of evidence of kidney disease, neither GFR category G1 or G2 fulfill the criteria for CKD.    eGFR calculation 2021 CKD-EPI creatinine equation, which does not include race as a factor   PROTIME-INR - Abnormal; Notable for the following components:    Protime 14.8 (*)     INR 1.16 (*)     All other components within normal limits   HEMOGLOBIN A1C - Abnormal; Notable for the following components:    Hemoglobin A1C 6.08 (*)     All other components within normal limits   CBC WITH AUTO DIFFERENTIAL - Abnormal; Notable for the following  components:    WBC 12.09 (*)     MCV 98.7 (*)     MCHC 31.3 (*)     Neutrophil % 80.0 (*)     Lymphocyte % 9.3 (*)     Eosinophil % 0.0 (*)     Neutrophils, Absolute 9.68 (*)     Monocytes, Absolute 1.23 (*)     All other components within normal limits   POCT GLUCOSE FINGERSTICK - Abnormal; Notable for the following components:    Glucose 200 (*)     All other components within normal limits   APTT - Normal   TSH - Normal   T4, FREE - Normal   RAINBOW DRAW    Narrative:     The following orders were created for panel order [place] Draw.  Procedure                               Abnormality         Status                     ---------                               -----------         ------                     Green Top (Gel)[[id number]]                                  Final result               Lavender Top[[id number]]                                     Final result               Gold Top - SST[[id number]]                                   Final result               Light Blue Top[[id number]]                                   Final result                 Please view results for these tests on the individual orders.   LIPID PANEL    Narrative:     Cholesterol Reference Ranges  (U.S. Department of Health and Human Services ATP III Classifications)    Desirable          <200 mg/dL  Borderline High    200-239 mg/dL  High Risk          >240 mg/dL      Triglyceride Reference Ranges  (U.S. Department of Health and Human Services ATP III Classifications)    Normal           <150 mg/dL  Borderline High  150-199 mg/dL  High             200-499 mg/dL  Very High        >500 mg/dL    HDL Reference Ranges  (U.S. Department of Health and Human Services ATP III Classifications)    Low     <40 mg/dl (major risk factor for CHD)  High    >60 mg/dl ('negative' risk factor for CHD)        LDL Reference Ranges  (U.S. Department of Health and Human Services ATP III Classifications)    Optimal          <100 mg/dL  Near Optimal     100-129  mg/dL  Borderline High  130-159 mg/dL  High             160-189 mg/dL  Very High        >189 mg/dL    LDL is calculated using the NIH LDL-C calculation.     VITAMIN B12   FOLATE   POCT GLUCOSE FINGERSTICK   TYPE AND SCREEN   GREEN TOP   LAVENDER TOP   GOLD TOP - SST   LIGHT BLUE TOP   CBC AND DIFFERENTIAL    Narrative:     The following orders were created for panel order CBC & Differential.  Procedure                               Abnormality         Status                     ---------                               -----------         ------                     CBC Auto Differential[[id number]]        Abnormal            Final result                 Please view results for these tests on the individual orders.       LOC: Person    Telemetry:  Critical Care    Cardiac Monitoring Ordered: yes    EKG:   ECG 12 Lead Stroke Evaluation   Preliminary Result   HEART RATE=84  bpm   RR Ektirfqx=185  ms   AL Qvfhdtdh=957  ms   P Horizontal Axis=-15  deg   P Front Axis=39  deg   QRSD Lfraatxl=875  ms   QT Mgchxejt=489  ms   GFaM=754  ms   QRS Axis=137  deg   T Wave Axis=235  deg   - ABNORMAL ECG -   Sinus rhythm   Low voltage, extremity leads   Abnormal T, consider ischemia, diffuse leads   Prolonged QT interval   Date and Time of Study:2025-03-11 16:14:14      Telemetry Scan   Final Result          Medications Given in the ED:   Medications   sodium chloride 0.9 % flush 10 mL (has no administration in time range)   sodium chloride 0.9 % flush 10 mL (10 mL Intravenous Given 3/11/25 1539)     Followed by   tenecteplase for stroke (TNKASE) injection 17 mg (17 mg Intravenous Given 3/11/25 1539)     Followed by   sodium chloride 0.9 % flush 10 mL (10 mL Intravenous Given 3/11/25 1551)   iopamidol (ISOVUE-370) 76 % injection 50 mL (50 mL Intravenous Given 3/11/25 1542)   iopamidol (ISOVUE-370) 76 % injection 100 mL (100 mL Intravenous Given 3/11/25 1543)   sodium chloride 0.9 % bolus 1,000 mL (0 mL Intravenous Stopped 3/11/25 1920)        Imaging results:  XR Chest 1 View  Result Date: 3/11/2025  Impression: Loop recorder projects over the left lower chest. No radiographic evidence of an acute cardiopulmonary abnormality. Electronically Signed: Liang Key  3/11/2025 4:37 PM EDT  Workstation ID: [id number]    CT Angiogram Head w AI Analysis of LVO  Result Date: 3/11/2025  Impression: 1. No evidence of hemodynamically significant stenosis of the carotid or vertebral arteries. 2. Areas of irregularity with beaded appearance involving the distal bilateral internal carotid arteries and distal V2 segment of the left vertebral artery suggesting changes of fibromuscular dysplasia. 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 4. Occlusion of the the anterior M2 branch of the left middle cerebral artery. This would correspond to the perfusion abnormality. Electronically Signed: Bhaskar Kimble MD  3/11/2025 4:31 PM EDT  Workstation ID: [id number]    CT Angiogram Neck  Result Date: 3/11/2025  Impression: 1. No evidence of hemodynamically significant stenosis of the carotid or vertebral arteries. 2. Areas of irregularity with beaded appearance involving the distal bilateral internal carotid arteries and distal V2 segment of the left vertebral artery suggesting changes of fibromuscular dysplasia. 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 4. Occlusion of the the anterior M2 branch of the left middle cerebral artery. This would correspond to the perfusion abnormality. Electronically Signed: Bhaskar Kimble MD  3/11/2025 4:31 PM EDT  Workstation ID: [id number]    CT CEREBRAL PERFUSION WITH & WITHOUT CONTRAST  Result Date: 3/11/2025  1.motion-degraded study with true area of Tmax rater than 6 seconds involving the left MCA distribution. Precise volume is not quantifiable due to motion artifact. Within this region there is a 7 mL volume of cerebral blood flow less than 30% compatible with a component of core infarct. Corresponding anterior M2 occlusion is noted on  CTA. These findings were discussed with Dr. Cruz in the emergency department at 3:51 p.m. on 3/11/2025 Electronically Signed: Bhaskar Kimble MD  3/11/2025 3:51 PM EDT  Workstation ID: [id number]    CT Head Without Contrast Stroke Protocol  Result Date: 3/11/2025  Impression: 1. No intracranial hemorrhage. 2. Generalized cerebral atrophy with moderate white matter findings of chronic microvascular disease. 3. Left mastoid effusion. Electronically Signed: Hal Cee MD  3/11/2025 3:37 PM EDT  Workstation ID: [id number]      Social issues:   Social History     Socioeconomic History    Marital status:    Tobacco Use    Smoking status: Never    Smokeless tobacco: Never   Vaping Use    Vaping status: Never Used   Substance and Sexual Activity    Alcohol use: No    Drug use: No    Sexual activity: Defer       NIH Stroke Scale:  Interval: 24 hrs post onset of symptoms +/- 20 mins (03/11/25 1540)  1a. Level of Consciousness: 0-->Alert, keenly responsive (03/11/25 1615)  1b. LOC Questions: 2-->Answers neither question correctly (03/11/25 1615)  1c. LOC Commands: 1-->Performs one task correctly (03/11/25 1615)  2. Best Gaze: 2-->Forced deviation, or total gaze paresis not overcome by the oculocephalic maneuver (03/11/25 1615)  3. Visual: 1-->Partial hemianopia (03/11/25 1615)  4. Facial Palsy: 2-->Partial paralysis (total or near-total paralysis of lower face) (03/11/25 1615)  5a. Motor Arm, Left: 0-->No drift, limb holds 90 (or 45) degrees for full 10 secs (03/11/25 1615)  5b. Motor Arm, Right: 3-->No effort against gravity, limb falls (03/11/25 1615)  6a. Motor Leg, Left: 0-->No drift, leg holds 30 degree position for full 5 secs (03/11/25 1615)  6b. Motor Leg, Right: 3-->No effort against gravity, leg falls to bed immediately (03/11/25 1615)  7. Limb Ataxia: 0-->Absent (03/11/25 1615)  8. Sensory: 1-->Mild-to-moderate sensory loss, patient feels pinprick is less sharp or is dull on the affected side, or there is a  loss of superficial pain with pinprick, but patient is aware of being touched (03/11/25 1615)  9. Best Language: 3-->Mute, global aphasia, no usable speech or auditory comprehension (03/11/25 1615)  10. Dysarthria: 0-->Normal (03/11/25 1615)  11. Extinction and Inattention (formerly Neglect): 0-->No abnormality (03/11/25 1615)    Total (NIH Stroke Scale): 18 (03/11/25 1615)     Additional notable assessment information:TNK      Nursing report ED to floor:  Ngozi RN    Eleni Maher RN   03/11/25 19:35 EDT

## 2025-03-11 NOTE — CONSULTS
Primary Care Provider: Provider, No Known     Consult requested by:  Dr Sanchez in the ER    Reason for Consultation: Neurological evaluation /code stroke    History taken from: Discussions with Dr. Brunson    Chief complaint: Aphasia and right-sided weakness       SUBJECTIVE:    History of present illness: Background per H&P: Lynda Patterson is a 85 y.o. female who was evaluated in CT suite at [facility]    Source of information is mostly the records and also I was able to get in touch with her daughter and later on son, they present here in the hospital in the waiting area  Also with information came from EMS that visited her facility    Apparently she was fine earlier today but probably around 1230 she had sudden onset of weakness on the right side and she could not speak.  I got a code stroke and saw the patient in CT suite immediately    Her CT head did not show anything acute but chronic changes    I talked to the family and did inclusion exclusion and she met the criteria and I got the consent from them regarding tenecteplase    But her modified [place] is high as she was not a good candidate for any intervention and they did not want any kind of heroics and surgeries    CTP was abnormal with with some motion artifact but clearly we could see significant delay in the left MCA distribution CTA may show an M2 occlusion which is too distant for recannulization anyway    She has cognitive changes and also immobilization and they want everything medical to be done but otherwise are very realistic    Vital signs were stable  She is not on any blood thinners    Inclusion exclusion was done with the family and through the records and reviewing the medication and help from Pat with pharmacy team    Available labs reviewed she is aphasic and not moving the right side and    Eyes are deviated to the left, she is following some commands on the left side but eyes are not crossing midline      - Portions  of the above HPI were copied from previous encounters and edited as appropriate. PMH as detailed below.     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    Macular degeneration     Nondisplaced fracture of proximal end of left fibula 09/03/2021    Sprain of left ankle, initial encounter 08/20/2021    Syncope     Urinary tract infection    ,   Past Surgical History:   Procedure Laterality Date    CHOLECYSTECTOMY  1988    TOTAL ABDOMINAL HYSTERECTOMY  1970   ,   Family History   Problem Relation Age of Onset    Stroke Mother     Stroke Father     Heart disease Brother     Heart attack Son     Heart disease Son    ,   Social History     Tobacco Use    Smoking status: Never    Smokeless tobacco: Never   Vaping Use    Vaping status: Never Used   Substance Use Topics    Alcohol use: No    Drug use: No   ,   Prior to Admission medications    Medication Sig Start Date End Date Taking? Authorizing Provider   atorvastatin (LIPITOR) 10 MG tablet  9/3/23   Yahir Hsieh MD   cephalexin (KEFLEX) 250 MG capsule Take 1 capsule (250 mg total) by mouth 1 (one) time each day 3/16/23   Yahir Hsieh MD   estradiol (ESTRACE) 0.1 MG/GM vaginal cream Insert 2 g into the vagina 3 (Three) Times a Week. [facility]    Yahir Hsieh MD   fluticasone (FLONASE) 50 MCG/ACT nasal spray 2 sprays into the nostril(s) as directed by provider Daily for 30 days. 1 spray in each nostril daily 8/17/21 9/16/21  Hiral Up PA-C   gabapentin (NEURONTIN) 300 MG capsule TAKE ONE CAPSULE BY MOUTH EVERY NIGHT AT BEDTIME 5/14/23   Cabrera Tirado MD   mometasone (ELOCON) 0.1 % cream APPLY TO AFFECTED AREA(S) DAILY 7/10/23   Jenn Tirado MD   ONE TOUCH LANCETS [facility]  ONETOUCH LANCETS 6/6/12   Provider, MD Yahir   OneTouch Ultra test strip TEST BLOOD SUGAR ONCE DAILY 9/13/21   Jenn Tirado MD   sertraline (ZOLOFT) 100 MG tablet Take 1.5 tablets by mouth Every Night. 3/16/23   Jenn Tirado MD    Allergies:  Ciprofloxacin, Penicillin g, and Sulfa antibiotics    Current Facility-Administered Medications   Medication Dose Route Frequency Provider Last Rate Last Admin    sodium chloride 0.9 % flush 10 mL  10 mL Intravenous PRN Vincent Sanchez MD         Current Outpatient Medications   Medication Sig Dispense Refill    atorvastatin (LIPITOR) 10 MG tablet       cephalexin (KEFLEX) 250 MG capsule Take 1 capsule (250 mg total) by mouth 1 (one) time each day      estradiol (ESTRACE) 0.1 MG/GM vaginal cream Insert 2 g into the vagina 3 (Three) Times a Week. MWF      fluticasone (FLONASE) 50 MCG/ACT nasal spray 2 sprays into the nostril(s) as directed by provider Daily for 30 days. 1 spray in each nostril daily 16 g 0    gabapentin (NEURONTIN) 300 MG capsule TAKE ONE CAPSULE BY MOUTH EVERY NIGHT AT BEDTIME 90 capsule 1    mometasone (ELOCON) 0.1 % cream APPLY TO AFFECTED AREA(S) DAILY 15 g 0    ONE TOUCH LANCETS misc ONETOUCH LANCETS      OneTouch Ultra test strip TEST BLOOD SUGAR ONCE DAILY 100 each 1    sertraline (ZOLOFT) 100 MG tablet Take 1.5 tablets by mouth Every Night.          ________________________________________________________        OBJECTIVE:  Upon today's exam, the patient is resting comfortably in no acute distress      The patient is awake and seems alert  She can follow some commands on the left side but she is aphasic as far as expression is concerned     Cranial nerve examination demonstrate:  Found to threat in the primary gaze  Pupils are round, reactive to light and accommodation and size of about 3 mm  No ptosis or nystagmus  Funduscopic examination was not successful  Eye movements are conjugate but deviated to the left side not  crossing midline     Sensation on the face and scalp are normal  Muscles of mastication are normal and symmetric  Muscles of  facial expression are straight right lower facial weakness  Hearing is intact bilaterally  Head turning and shoulder shrugs were unremarkable  Tongue was midline  I could not visualize  oropharynx or uvula     Motor examination:  Normal bulk, tone and strength was 5-/5 the left side and 1/4 on the right side  No fasciculations     Sensory examination:  Intact for soft touch, pain in the left side, questionable on the right  Romberg was not evaluated     Reflexes:  0/4     Coordination:  She could do touching her finger on the left side but could not do formal heel-to-shin or finger-to-nose to finger even on the left side     Gait:  Deferred     Toe signs:  Mute      NIH Stroke Scale  Interval: 24 hrs post onset of symptoms +/- 20 mins  1a. Level of Consciousness: 2-->Not alert, requires repeated stimulation to attend, or is obtunded and requires strong or painful stimulation to make movements (not stereotyped)  1b. LOC Questions: 2-->Answers neither question correctly  1c. LOC Commands: 1-->Performs one task correctly  2. Best Gaze: 2-->Forced deviation, or total gaze paresis not overcome by the oculocephalic maneuver  3. Visual: 2-->Complete hemianopia  4. Facial Palsy: 3-->Complete paralysis of one or both sides (absence of facial movement in the upper and lower face)  5a. Motor Arm, Left: 2-->Some effort against gravity, limb cannot get to or maintain (if cued) 90 (or 45) degrees, drifts down to bed, but has some effort against gravity  5b. Motor Arm, Right: 3-->No effort against gravity, limb falls  6a. Motor Leg, Left: 2-->Some effort against gravity, leg falls to bed by 5 secs, but has some effort against gravity  6b. Motor Leg, Right: 3-->No effort against gravity, leg falls to bed immediately  7. Limb Ataxia: 2-->Present in two limbs  8. Sensory: 2-->Severe to total sensory loss,  patient is not aware of being touched in the face, arm, and leg  9. Best Language: 2-->Severe aphasia, all communication is through fragmentary expression, great need for inference, questioning, and guessing by the listener. Range of information that can be exchanged is limited, listener carries burden of. . . (see row details)  10. Dysarthria: 2-->Severe dysarthria, patients speech is so slurred as to be unintelligible in the absence of or out of proportion to any dysphasia, or is mute/anarthric  11. Extinction and Inattention (formerly Neglect): 0-->No abnormality  Total (NIH Stroke Scale): 30         ________________________________________________________   RESULTS REVIEW:    VITAL SIGNS:   Heart Rate:  [72-75] 75  Resp:  [13] 13  BP: (156)/(75) 156/75     LABS:      Lab 03/11/25  1522   PROTIME 14.8*   APTT 29.1                 Lab 03/11/25  1522   PROTIME 14.8*   INR 1.16*                     Lab Results   Component Value Date    TSH 3.110 12/15/2022     (H) 03/16/2023    HGBA1C 6.10 (H) 03/16/2023    [id number] 06/23/2022       IMAGING STUDIES:  CT CEREBRAL PERFUSION WITH & WITHOUT CONTRAST  Result Date: 3/11/2025  1.motion-degraded study with true area of Tmax rater than 6 seconds involving the left MCA distribution. Precise volume is not quantifiable due to motion artifact. Within this region there is a 7 mL volume of cerebral blood flow less than 30% compatible with a component of core infarct. Corresponding anterior M2 occlusion is noted on CTA. These findings were discussed with Dr. Cruz in the emergency department at 3:51 p.m. on 3/11/2025 Electronically Signed: Bhaskar Kimble MD  3/11/2025 3:51 PM EDT  Workstation ID: [id number]    CT Head Without Contrast Stroke Protocol  Result Date: 3/11/2025  Impression: 1. No intracranial hemorrhage. 2. Generalized cerebral atrophy with moderate white matter findings of chronic microvascular disease. 3. Left mastoid effusion. Electronically Signed: Hal  MD Jaye  3/11/2025 3:37 PM EDT  Workstation ID: [id number]      I reviewed the patient's new clinical results.    ________________________________________________________     PROBLEM LIST:    * No active hospital problems. *            ASSESSMENT/PLAN:  Likely left hemispheric infarct  Abnormal CTP  Abnormal CTA but does not look like she is intervention candidate, based on location and also she has high modified Cam[place] score    After discussing with the family and the ER physician, tenecteplase given and we will follow post stroke protocol    - Implement post TNK protocol, please see order set  - Admit to ICU  - Keep systolic blood pressure less than 180/105, Cardene drip as needed, avoid hypotension, vital signs per protocol.  - NPO until bedside swallow test completed and passed and/or cleared by speech therapy.  - Closely monitor neurological status, NIH protocol and PRN for changes in neurological status  - Please call with any acute onset of headache, stat CT head at that time.  - Please monitor and call ICU team with any active bleeding.      Modification of stroke risk factors:   - Blood pressure should be less than 130/80 outpatient, HbA1c less than 6.5, LDL less than 70; b12>500 and smoking cessation if applicable. We would be grateful if the primary team / primary care physician would keep a close watch on the above targets.  - Stroke education  - Follow up with neurologist of choice      I discussed the patient's findings and my recommendations with family, nursing staff, and ER team    Sanjana Harp MD  03/11/25  15:54 EDT

## 2025-03-12 ENCOUNTER — APPOINTMENT (OUTPATIENT)
Dept: GENERAL RADIOLOGY | Facility: HOSPITAL | Age: 85
End: 2025-03-12
Payer: MEDICARE

## 2025-03-12 ENCOUNTER — APPOINTMENT (OUTPATIENT)
Dept: CT IMAGING | Facility: HOSPITAL | Age: 85
End: 2025-03-12
Payer: MEDICARE

## 2025-03-12 ENCOUNTER — APPOINTMENT (OUTPATIENT)
Dept: CARDIOLOGY | Facility: HOSPITAL | Age: 85
End: 2025-03-12
Payer: MEDICARE

## 2025-03-12 LAB
ALBUMIN SERPL-MCNC: 3.5 G/DL (ref 3.5–5.2)
ALBUMIN/GLOB SERPL: 1.2 G/DL
ALP SERPL-CCNC: 53 U/L (ref 39–117)
ALT SERPL W P-5'-P-CCNC: 20 U/L (ref 1–33)
ANION GAP SERPL CALCULATED.3IONS-SCNC: 12.8 MMOL/L (ref 5–15)
AORTIC DIMENSIONLESS INDEX: 0.62 (DI)
AST SERPL-CCNC: 34 U/L (ref 1–32)
AV MEAN PRESS GRAD SYS DOP V1V2: 4 MMHG
AV VMAX SYS DOP: 141 CM/SEC
BASOPHILS # BLD AUTO: 0.02 10*3/MM3 (ref 0–0.2)
BASOPHILS NFR BLD AUTO: 0.2 % (ref 0–1.5)
BH CV ECHO LEFT VENTRICLE GLOBAL LONGITUDINAL STRAIN: -11.2 %
BH CV ECHO MEAS - ACS: 1.6 CM
BH CV ECHO MEAS - AI P1/2T: 440 MSEC
BH CV ECHO MEAS - AO MAX PG: 8 MMHG
BH CV ECHO MEAS - AO V2 VTI: 29.7 CM
BH CV ECHO MEAS - AVA(I,D): 1.77 CM2
BH CV ECHO MEAS - EDV(CUBED): 59.3 ML
BH CV ECHO MEAS - EDV(MOD-SP4): 104 ML
BH CV ECHO MEAS - EF(MOD-SP4): 38.8 %
BH CV ECHO MEAS - ESV(CUBED): 27 ML
BH CV ECHO MEAS - ESV(MOD-SP4): 63.6 ML
BH CV ECHO MEAS - FS: 23.1 %
BH CV ECHO MEAS - IVS/LVPW: 1.11 CM
BH CV ECHO MEAS - IVSD: 1 CM
BH CV ECHO MEAS - LA DIMENSION: 3.6 CM
BH CV ECHO MEAS - LAT PEAK E' VEL: 4.7 CM/SEC
BH CV ECHO MEAS - LV DIASTOLIC VOL/BSA (35-75): 64.5 CM2
BH CV ECHO MEAS - LV MASS(C)D: 113.6 GRAMS
BH CV ECHO MEAS - LV MAX PG: 3.1 MMHG
BH CV ECHO MEAS - LV MEAN PG: 2 MMHG
BH CV ECHO MEAS - LV SYSTOLIC VOL/BSA (12-30): 39.4 CM2
BH CV ECHO MEAS - LV V1 MAX: 88 CM/SEC
BH CV ECHO MEAS - LV V1 VTI: 18.5 CM
BH CV ECHO MEAS - LVIDD: 3.9 CM
BH CV ECHO MEAS - LVIDS: 3 CM
BH CV ECHO MEAS - LVOT AREA: 2.8 CM2
BH CV ECHO MEAS - LVOT DIAM: 1.9 CM
BH CV ECHO MEAS - LVPWD: 0.9 CM
BH CV ECHO MEAS - MED PEAK E' VEL: 3.3 CM/SEC
BH CV ECHO MEAS - MR MAX PG: 36.2 MMHG
BH CV ECHO MEAS - MR MAX VEL: 301 CM/SEC
BH CV ECHO MEAS - MV A MAX VEL: 145 CM/SEC
BH CV ECHO MEAS - MV DEC SLOPE: 695 CM/SEC2
BH CV ECHO MEAS - MV DEC TIME: 0.18 SEC
BH CV ECHO MEAS - MV E MAX VEL: 73.3 CM/SEC
BH CV ECHO MEAS - MV E/A: 0.51
BH CV ECHO MEAS - MV MAX PG: 13 MMHG
BH CV ECHO MEAS - MV MEAN PG: 4 MMHG
BH CV ECHO MEAS - MV P1/2T: 33 MSEC
BH CV ECHO MEAS - MV V2 VTI: 33.2 CM
BH CV ECHO MEAS - MVA(P1/2T): 6.7 CM2
BH CV ECHO MEAS - MVA(VTI): 1.58 CM2
BH CV ECHO MEAS - PA ACC TIME: 0.13 SEC
BH CV ECHO MEAS - PA V2 MAX: 80.8 CM/SEC
BH CV ECHO MEAS - PULM A REVS DUR: 0.13 SEC
BH CV ECHO MEAS - PULM A REVS VEL: 38.4 CM/SEC
BH CV ECHO MEAS - PULM DIAS VEL: 27.5 CM/SEC
BH CV ECHO MEAS - PULM S/D: 2.01
BH CV ECHO MEAS - PULM SYS VEL: 55.4 CM/SEC
BH CV ECHO MEAS - RAP SYSTOLE: 8 MMHG
BH CV ECHO MEAS - RV MAX PG: 1.98 MMHG
BH CV ECHO MEAS - RV V1 MAX: 70.3 CM/SEC
BH CV ECHO MEAS - RV V1 VTI: 14.3 CM
BH CV ECHO MEAS - RVDD: 2.7 CM
BH CV ECHO MEAS - RVSP: 33 MMHG
BH CV ECHO MEAS - SV(LVOT): 52.5 ML
BH CV ECHO MEAS - SV(MOD-SP4): 40.4 ML
BH CV ECHO MEAS - SVI(LVOT): 32.5 ML/M2
BH CV ECHO MEAS - SVI(MOD-SP4): 25 ML/M2
BH CV ECHO MEAS - TAPSE (>1.6): 1.52 CM
BH CV ECHO MEAS - TR MAX PG: 25 MMHG
BH CV ECHO MEAS - TR MAX VEL: 250 CM/SEC
BH CV ECHO MEASUREMENTS AVERAGE E/E' RATIO: 18.33
BH CV XLRA - TDI S': 10.5 CM/SEC
BILIRUB SERPL-MCNC: 0.3 MG/DL (ref 0–1.2)
BUN SERPL-MCNC: 42 MG/DL (ref 8–23)
BUN/CREAT SERPL: 27.8 (ref 7–25)
CALCIUM SPEC-SCNC: 8.5 MG/DL (ref 8.6–10.5)
CHLORIDE SERPL-SCNC: 115 MMOL/L (ref 98–107)
CO2 SERPL-SCNC: 20.2 MMOL/L (ref 22–29)
CREAT SERPL-MCNC: 1.51 MG/DL (ref 0.57–1)
DEPRECATED RDW RBC AUTO: 48.8 FL (ref 37–54)
EGFRCR SERPLBLD CKD-EPI 2021: 33.3 ML/MIN/1.73
EOSINOPHIL # BLD AUTO: 0.01 10*3/MM3 (ref 0–0.4)
EOSINOPHIL NFR BLD AUTO: 0.1 % (ref 0.3–6.2)
ERYTHROCYTE [DISTWIDTH] IN BLOOD BY AUTOMATED COUNT: 13.3 % (ref 12.3–15.4)
GLOBULIN UR ELPH-MCNC: 3 GM/DL
GLUCOSE BLDC GLUCOMTR-MCNC: 88 MG/DL (ref 70–105)
GLUCOSE BLDC GLUCOMTR-MCNC: 91 MG/DL (ref 70–105)
GLUCOSE SERPL-MCNC: 92 MG/DL (ref 65–99)
HCT VFR BLD AUTO: 42.3 % (ref 34–46.6)
HGB BLD-MCNC: 13.4 G/DL (ref 12–15.9)
IMM GRANULOCYTES # BLD AUTO: 0.03 10*3/MM3 (ref 0–0.05)
IMM GRANULOCYTES NFR BLD AUTO: 0.3 % (ref 0–0.5)
LV EF BIPLANE MOD: 39 %
LYMPHOCYTES # BLD AUTO: 1.78 10*3/MM3 (ref 0.7–3.1)
LYMPHOCYTES NFR BLD AUTO: 19.8 % (ref 19.6–45.3)
MAGNESIUM SERPL-MCNC: 2.3 MG/DL (ref 1.6–2.4)
MCH RBC QN AUTO: 31.4 PG (ref 26.6–33)
MCHC RBC AUTO-ENTMCNC: 31.7 G/DL (ref 31.5–35.7)
MCV RBC AUTO: 99.1 FL (ref 79–97)
MONOCYTES # BLD AUTO: 1.26 10*3/MM3 (ref 0.1–0.9)
MONOCYTES NFR BLD AUTO: 14 % (ref 5–12)
MRSA DNA SPEC QL NAA+PROBE: NORMAL
NEUTROPHILS NFR BLD AUTO: 5.87 10*3/MM3 (ref 1.7–7)
NEUTROPHILS NFR BLD AUTO: 65.6 % (ref 42.7–76)
NRBC BLD AUTO-RTO: 0 /100 WBC (ref 0–0.2)
PHOSPHATE SERPL-MCNC: 3.4 MG/DL (ref 2.5–4.5)
PLATELET # BLD AUTO: 177 10*3/MM3 (ref 140–450)
PMV BLD AUTO: 10.8 FL (ref 6–12)
POTASSIUM SERPL-SCNC: 3.5 MMOL/L (ref 3.5–5.2)
POTASSIUM SERPL-SCNC: 4.2 MMOL/L (ref 3.5–5.2)
PROT SERPL-MCNC: 6.5 G/DL (ref 6–8.5)
QT INTERVAL: 460 MS
QTC INTERVAL: 543 MS
RBC # BLD AUTO: 4.27 10*6/MM3 (ref 3.77–5.28)
SINUS: 3.1 CM
SODIUM SERPL-SCNC: 148 MMOL/L (ref 136–145)
WBC NRBC COR # BLD AUTO: 8.97 10*3/MM3 (ref 3.4–10.8)

## 2025-03-12 PROCEDURE — 74230 X-RAY XM SWLNG FUNCJ C+: CPT

## 2025-03-12 PROCEDURE — 25010000002 POTASSIUM CHLORIDE 10 MEQ/100ML SOLUTION: Performed by: INTERNAL MEDICINE

## 2025-03-12 PROCEDURE — 92611 MOTION FLUOROSCOPY/SWALLOW: CPT

## 2025-03-12 PROCEDURE — 93356 MYOCRD STRAIN IMG SPCKL TRCK: CPT

## 2025-03-12 PROCEDURE — 84132 ASSAY OF SERUM POTASSIUM: CPT | Performed by: INTERNAL MEDICINE

## 2025-03-12 PROCEDURE — 70450 CT HEAD/BRAIN W/O DYE: CPT

## 2025-03-12 PROCEDURE — 92610 EVALUATE SWALLOWING FUNCTION: CPT

## 2025-03-12 PROCEDURE — 99233 SBSQ HOSP IP/OBS HIGH 50: CPT | Performed by: PSYCHIATRY & NEUROLOGY

## 2025-03-12 PROCEDURE — 84100 ASSAY OF PHOSPHORUS: CPT

## 2025-03-12 PROCEDURE — 25010000002 SULFUR HEXAFLUORIDE MICROSPH 60.7-25 MG RECONSTITUTED SUSPENSION: Performed by: INTERNAL MEDICINE

## 2025-03-12 PROCEDURE — 93306 TTE W/DOPPLER COMPLETE: CPT

## 2025-03-12 PROCEDURE — 82948 REAGENT STRIP/BLOOD GLUCOSE: CPT

## 2025-03-12 PROCEDURE — 80053 COMPREHEN METABOLIC PANEL: CPT

## 2025-03-12 PROCEDURE — 83735 ASSAY OF MAGNESIUM: CPT

## 2025-03-12 PROCEDURE — 93356 MYOCRD STRAIN IMG SPCKL TRCK: CPT | Performed by: INTERNAL MEDICINE

## 2025-03-12 PROCEDURE — 93306 TTE W/DOPPLER COMPLETE: CPT | Performed by: INTERNAL MEDICINE

## 2025-03-12 PROCEDURE — 85025 COMPLETE CBC W/AUTO DIFF WBC: CPT

## 2025-03-12 RX ORDER — BUPROPION HYDROCHLORIDE 150 MG/1
150 TABLET ORAL DAILY
Status: DISCONTINUED | OUTPATIENT
Start: 2025-03-12 | End: 2025-03-19 | Stop reason: HOSPADM

## 2025-03-12 RX ORDER — BUSPIRONE HYDROCHLORIDE 5 MG/1
10 TABLET ORAL 3 TIMES DAILY
Status: DISCONTINUED | OUTPATIENT
Start: 2025-03-12 | End: 2025-03-19 | Stop reason: HOSPADM

## 2025-03-12 RX ORDER — TRAMADOL HYDROCHLORIDE 50 MG/1
50 TABLET ORAL 2 TIMES DAILY PRN
Status: DISCONTINUED | OUTPATIENT
Start: 2025-03-12 | End: 2025-03-19 | Stop reason: HOSPADM

## 2025-03-12 RX ORDER — POTASSIUM CHLORIDE 7.45 MG/ML
10 INJECTION INTRAVENOUS
Status: COMPLETED | OUTPATIENT
Start: 2025-03-12 | End: 2025-03-12

## 2025-03-12 RX ORDER — SERTRALINE HYDROCHLORIDE 25 MG/1
25 TABLET, FILM COATED ORAL DAILY
Status: DISCONTINUED | OUTPATIENT
Start: 2025-03-12 | End: 2025-03-19 | Stop reason: HOSPADM

## 2025-03-12 RX ADMIN — MUPIROCIN 1 APPLICATION: 20 OINTMENT TOPICAL at 20:24

## 2025-03-12 RX ADMIN — BARIUM SULFATE 50 ML: 400 SUSPENSION ORAL at 12:23

## 2025-03-12 RX ADMIN — ATORVASTATIN CALCIUM 80 MG: 40 TABLET, FILM COATED ORAL at 20:24

## 2025-03-12 RX ADMIN — ASPIRIN 325 MG ORAL TABLET 325 MG: 325 PILL ORAL at 20:24

## 2025-03-12 RX ADMIN — SENNOSIDES AND DOCUSATE SODIUM 2 TABLET: 50; 8.6 TABLET ORAL at 20:24

## 2025-03-12 RX ADMIN — Medication 10 ML: at 20:24

## 2025-03-12 RX ADMIN — POTASSIUM CHLORIDE 10 MEQ: 7.46 INJECTION, SOLUTION INTRAVENOUS at 08:46

## 2025-03-12 RX ADMIN — MUPIROCIN 1 APPLICATION: 20 OINTMENT TOPICAL at 08:46

## 2025-03-12 RX ADMIN — POTASSIUM CHLORIDE 10 MEQ: 7.46 INJECTION, SOLUTION INTRAVENOUS at 10:59

## 2025-03-12 RX ADMIN — Medication 10 ML: at 08:46

## 2025-03-12 RX ADMIN — SULFUR HEXAFLUORIDE 3 ML: KIT at 01:41

## 2025-03-12 NOTE — ACP (ADVANCE CARE PLANNING)
Patient alert, but not oriented. Son and daughter in law at bedside. Patient has on file a POA and Medical Healthcare Representative. Medical representatives are both son Allen Patterson and daughter Shagufta Eagle. Education and discussion over POST and Out of Hospital DNR. Gave copies to son. Son/family to contact if needing further assistance.     Chaplain Tae Rodriguez

## 2025-03-12 NOTE — CASE MANAGEMENT/SOCIAL WORK
Discharge Planning Assessment   Yrn     Patient Name: Lynda Patterson  MRN: [id number]  Today's Date: 3/12/2025    Admit Date: 3/11/2025    Plan: From [facility]. No Precert/PASRR required.   Discharge Needs Assessment       Row Name 03/12/25 1121       Living Environment    People in Home other (see comments)    [facility]    Current Living Arrangements extended care facility    Duration at Residence 2 yrs    Potentially Unsafe Housing Conditions none    In the past 12 months has the electric, gas, oil, or water company threatened to shut off services in your home? No    Provides Primary Care For no one    Family Caregiver if Needed other (see comments);child(raffy), adult    Family Caregiver Names Allen/Franci - son and his spouse (POA)    Quality of Family Relationships helpful;involved;supportive    Able to Return to Prior Arrangements yes       Resource/Environmental Concerns    Resource/Environmental Concerns none    Transportation Concerns none       Transportation Needs    In the past 12 months, has lack of transportation kept you from medical appointments or from getting medications? no    In the past 12 months, has lack of transportation kept you from meetings, work, or from getting things needed for daily living? No       Food Insecurity    Within the past 12 months, you worried that your food would run out before you got the money to buy more. Never true    Within the past 12 months, the food you bought just didn't last and you didn't have money to get more. Never true       Transition Planning    Patient/Family Anticipates Transition to long-term care facility    Patient/Family Anticipated Services at Transition none    Transportation Anticipated health plan transportation;family or friend will provide       Discharge Needs Assessment    Readmission Within the Last 30 Days no previous admission in last 30 days    Equipment Currently Used at Home  wheelchair    Concerns to be Addressed discharge planning    Anticipated Changes Related to Illness none                   Discharge Plan       Row Name 03/12/25 1122       Plan    Plan From [facility]. No Precert/PASRR required.    Patient/Family in Agreement with Plan yes    Plan Comments CM met with patient/son and his wife at bedside. Patient A&O-some memory deficit. Patient lives at [facility], ok per liaison and son to return. Will need ambulance transport at discharge. Patient is dependent for ADLs, cannot stand but can sit in WC with assist, feeds self. PCP and pharmacy (facility) confirmed. No need for M2B.  Denies financial assistance needs for medication and/or food. DC Barriers: Neuro checks, NPO, SLP/PT/OT evals pending, TNH given 3/11 @1539, Neuro/WC following.                  Continued Care and Services - Admitted Since 3/11/2025       Destination Coordination complete.      Service Provider Request Status Services Address Phone Fax Patient Preferred    [facility]  Selected [facility] [address] [place] [id number] [phone] --                  Expected Discharge Date and Time       Expected Discharge Date Expected Discharge Time    Mar 14, 2025            Demographic Summary       Row Name 03/12/25 1121       General Information    Admission Type inpatient    Arrived From emergency department    Required Notices Provided Important Message from Medicare    Referral Source admission list    Reason for Consult discharge planning    Preferred Language English                   Functional Status       Row Name 03/12/25 1121       Functional Status    Usual Activity Tolerance poor    Current Activity Tolerance poor       Functional Status, IADL    Medications completely dependent    Meal Preparation completely dependent    Housekeeping completely dependent    Laundry completely dependent    Shopping completely dependent       Mental  Status    General Appearance WDL WDL       Mental Status Summary    Recent Changes in Mental Status/Cognitive Functioning no changes             VARGHESE Osman RN  ICU/CVU   O: [phone]  C: [phone]  [email or web address]

## 2025-03-12 NOTE — NURSING NOTE
WOCN note:    85 yr old female admitted 3/11/25 with CVA. WOCN consult received for moisture associated skin damage to her coccyx. Chart and photo reviewed and discussed with primary RN. There are no open areas at this time and zinc barrier paste is being used. Recommend to continue pressure injury prevention measures per protocol and skin care for any episodes of incontinence. We will follow as needed.

## 2025-03-12 NOTE — H&P
Critical Care History and Physical     Lynda SONYA Patterson : 1940 MRN:[id number] LOS:0 ROOM: 2306/1     Reason for admission: CVA (cerebral vascular accident)     Assessment / Plan     Acute ischemic stroke, M2 occlusion  Code stroke protocol initiated in ED, status post tenecteplase at 1539  Ischemic Stroke orders initiated.  CT scan reviewed: No intracranial hemorrhage. Generalized cerebral atrophy with moderate white matter findings of chronic microvascular disease.  CTA reviewed: Occlusion of the the anterior M2 branch of the left middle cerebral artery   CT perfusion study: true area of Tmax greater than 6 seconds involving the left MCA distribution   Repeat CT scan of head without, 24 hours post TNK administration  MRI  of brain and ECHO ordered  LDL: 94  and A1C: 6.08  Start moderate/high intensity statins.  Neurology consulted.  No antiplatelet or anticoagulant medication until 24 hours post-TNK administration  Observe for signs and symptoms of bleeding, if observed, immediately notify Neurology  NPO pending speech therapy evaluation, patient has a right facial droop   PT/ST/OT evaluation & treatment    CKD stage 3  Remains nonoliguric. Baseline creatinine 1.6  C/w IV fluids as ordered.  Monitor Input/Output very closely.   Avoids NSAIDs, nephrotoxic medications, and hypotension.    Diabetes mellitus Type 2, not insulin-dependent : well controlled.   Not on home insulin or oral agents  A1C 6.08  Accu checks every 6 hours with sliding scale coverage as needed.     Hyperlipidemia: lipid panel WNL, continue atorvastatin  Anxiety: continue buspar, bupropion, and sertraline when able to take PO      Code Status (Patient has no pulse and is not breathing): No CPR (Do Not Attempt to Resuscitate)  Medical Interventions (Patient has pulse or is breathing): Limited Support  Medical Intervention Limits: No intubation (DNI)  Level Of Support Discussed With: Health Care Surrogate       Nutrition:   NPO Diet  Provider e-prescribed prescription to the pharmacy.    NPO Type: Strict NPO     VTE Prophylaxis:  Mechanical VTE prophylaxis orders are present.         History of Present illness     Lynda Patterson is a 85 y.o. female with PMH of type 2 diabetes, CKD stage III, anxiety, GERD presented from an extended care facility to the hospital for sudden onset weakness and inability to speak around 12:30 PM. She has expressive aphasia and hemiparesis on the right side. Code stroke was called and consent was given for TNK which was given at 1539.  Dr. Wolff with neurology spoke with family and they denied wanting any surgical intervention but would like supportive care.  CT perfusion showed a significant delay in the left MCA distribution and CTA confirmed an M2 occlusion.  She will be closely monitored, repeat head CT 24 hours post TNK administration, and was admitted to the ICU with a principal diagnosis of CVA (cerebral vascular accident).  Labs remarkable for CO2 19.8, anion gap 16.2, creatinine 1.78, glucose 230, AST 40, WBC 12.09.      ACP: ACP documentation on file. Patient's son and daughter have dual power of . DNR/DNI.    Patient was seen and examined on 03/11/25 at 22:38 EDT .      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    Macular degeneration     Nondisplaced fracture of proximal end of left fibula 09/03/2021    Sprain of left ankle, initial encounter 08/20/2021    Syncope     Urinary tract infection       Past Surgical History:   Procedure Laterality Date    CHOLECYSTECTOMY  1988    TOTAL ABDOMINAL HYSTERECTOMY  1970      Social History     Socioeconomic History    Marital status:    Tobacco Use    Smoking status: Never    Smokeless tobacco: Never   Vaping Use    Vaping status: Never  Used   Substance and Sexual Activity    Alcohol use: No    Drug use: No    Sexual activity: Defer      Family History   Problem Relation Age of Onset    Stroke Mother     Stroke Father     Heart disease Brother     Heart attack Son     Heart disease Son       Allergies   Allergen Reactions    Ciprofloxacin Nausea And Vomiting    Penicillin G Nausea And Vomiting    Sulfa Antibiotics Nausea And Vomiting      Social Drivers of Health     Tobacco Use: Unknown (5/9/2024)    Received from [facility]    Patient History     Smoking Tobacco Use: Never Assessed     Smokeless Tobacco Use: Never     Passive Exposure: Not on file   Alcohol Use: Not At Risk (6/18/2019)    AUDIT-C     Frequency of Alcohol Consumption: Never     Average Number of Drinks: Not on file     Frequency of Binge Drinking: Not on file   Financial Resource Strain: Not on file   Food Insecurity: Not on file   Transportation Needs: Not on file   Physical Activity: Not on file   Stress: Not on file   Social Connections: Not on file   Interpersonal Safety: Not on file   Depression: Not at risk (6/2/2023)    PHQ-2     PHQ-2 Score: 1   Housing Stability: Not on file   Utilities: Not on file   Health Literacy: Not on file   Employment: Not on file   Disabilities: Not on file        Home Medications     Prior to Admission medications    Medication Sig Start Date End Date Taking? Authorizing Provider   atorvastatin (LIPITOR) 10 MG tablet Take 1 tablet by mouth Daily.   Yes ProviderYahir MD   buPROPion XL (WELLBUTRIN XL) 150 MG 24 hr tablet Take 1 tablet by mouth Daily.   Yes ProviderYahir MD   busPIRone (BUSPAR) 10 MG tablet Take 1 tablet by mouth 3 (Three) Times a Day.   Yes ProviderYahir MD   cephalexin (KEFLEX) 250 MG capsule Take 1 capsule by mouth Daily.   Yes ProviderYahir MD   ferrous sulfate 325 (65 FE) MG tablet Take 1 tablet by mouth Daily.   Yes ProviderYahir MD   Hydrocortisone (Hermelinda's magic butt cream)  Apply 1 Application topically to the appropriate area as directed 4 (Four) Times a Day As Needed.   Yes Yahir Hsieh MD   sertraline (ZOLOFT) 25 MG tablet Take 1 tablet by mouth Daily.   Yes Yahir Hsieh MD   acetaminophen (TYLENOL) 325 MG tablet Take 2 tablets by mouth Every 6 (Six) Hours As Needed for Mild Pain.    Yahir Hsieh MD   bismuth subsalicylate (PEPTO BISMOL) 262 MG/15ML suspension Take 15 mL by mouth Daily As Needed for Indigestion.    Yahir Hsieh MD   camphor-menthol (SARNA) 0.5-0.5 % lotion Apply 1 Application topically to the appropriate area as directed 2 (Two) Times a Day As Needed for Itching.    Yahir Hsieh MD   guaifenesin (ROBITUSSIN) 100 MG/5ML liquid Take 10 mL by mouth Every 6 (Six) Hours As Needed for Cough.    Yahir Hsieh MD   loperamide (IMODIUM) 2 MG capsule Take 1 capsule by mouth Every 6 (Six) Hours As Needed for Diarrhea.    Yahir Hsieh MD   midodrine (PROAMATINE) 5 MG tablet Take 1 tablet by mouth 2 (Two) Times a Day As Needed.    Yahir Hsieh MD   traMADol (ULTRAM) 50 MG tablet Take 1 tablet by mouth 2 (Two) Times a Day As Needed for Moderate Pain.    Yahir Hsieh MD   vitamin D (ERGOCALCIFEROL) 1.25 MG (16639 UT) capsule capsule Take 1 capsule by mouth Every 30 (Thirty) Days. First Wednesday of month    Yahir Hsieh MD   atorvastatin (LIPITOR) 10 MG tablet  9/3/23 3/11/25  Yahir Hsieh MD   cephalexin (KEFLEX) 250 MG capsule Take 1 capsule (250 mg total) by mouth 1 (one) time each day 3/16/23 3/11/25  Yahir Hsieh MD   estradiol (ESTRACE) 0.1 MG/GM vaginal cream Insert 2 g into the vagina 3 (Three) Times a Week. [facility]  3/11/25  Yahir Hsieh MD   fluticasone (FLONASE) 50 MCG/ACT nasal spray 2 sprays into the nostril(s) as directed by provider Daily for 30 days. 1 spray in each nostril daily 8/17/21 3/11/25  Hiral Up PA-C   gabapentin (NEURONTIN) 300 MG  capsule TAKE ONE CAPSULE BY MOUTH EVERY NIGHT AT BEDTIME 5/14/23 3/11/25  Cabrera Tirado MD   mometasone (ELOCON) 0.1 % cream APPLY TO AFFECTED AREA(S) DAILY 7/10/23 3/11/25  Jenn Tirado MD   ONE TOUCH LANCETS misc ONETOUCH LANCETS 6/6/12 3/11/25  ProviderYahir MD   OneTouch Ultra test strip TEST BLOOD SUGAR ONCE DAILY 9/13/21 3/11/25  Jenn Tirado MD   sertraline (ZOLOFT) 100 MG tablet Take 1.5 tablets by mouth Every Night. 3/16/23 3/11/25  Jenn Tirado MD        Objective / Physical Exam     Vital signs:  Temp: 97.2 °F (36.2 °C)  BP: 123/62  Heart Rate: 64  Resp: 13  SpO2: 97 %  Weight: 66.5 kg (146 lb 9.7 oz)    Admission Weight: Weight: 66.5 kg (146 lb 9.7 oz)    Physical Exam  Vitals and nursing note reviewed.   Constitutional:       General: She is not in acute distress.     Appearance: She is toxic-appearing.   HENT:      Head: Normocephalic and atraumatic.      Nose: Nose normal.      Mouth/Throat:      Mouth: Mucous membranes are moist.   Eyes:      Conjunctiva/sclera: Conjunctivae normal.      Comments: Pupils equal   Cardiovascular:      Rate and Rhythm: Normal rate and regular rhythm.      Heart sounds: Normal heart sounds. No murmur heard.  Pulmonary:      Effort: Pulmonary effort is normal.      Comments: Bilateral breath sounds clear, diminished  Abdominal:      Palpations: Abdomen is soft.   Musculoskeletal:         General: No swelling.   Skin:     General: Skin is warm and dry.   Neurological:      Mental Status: She is alert.      Comments: Right facial droop  Patient's answers are inconsistent and aphasic, it seems that sensation is intact on bilateral upper extremities and sensation absent on bilateral lower extremities.   Unable to follow commands with right arm, left arm  strength intact, wiggles bilateral feet to command  Aphasic          Labs     Results from last 7 days   Lab Units 03/11/25  1522   WBC 10*3/mm3 12.09*    HEMOGLOBIN g/dL 14.0   HEMATOCRIT % 44.8   PLATELETS 10*3/mm3 221      Results from last 7 days   Lab Units 03/11/25  1522   SODIUM mmol/L 144   POTASSIUM mmol/L 3.9   CHLORIDE mmol/L 108*   CO2 mmol/L 19.8*   ANION GAP mmol/L 16.2*   BUN mg/dL 50*   CREATININE mg/dL 1.78*   GLUCOSE mg/dL 230*   ALT (SGPT) U/L 27   AST (SGOT) U/L 40*   ALK PHOS U/L 60            Imaging     Chest X ray: My independent assessment showed mild pulmonary vascular congestion, no infiltrates or effusions    EKG: My independent evaluation showed sinus rhythm, S-T depression in V leads, HR 84, qtc 543    Current Medications     Scheduled Meds:  [START ON 3/12/2025] aspirin, 325 mg, Oral, Daily   Or  [START ON 3/12/2025] aspirin, 300 mg, Rectal, Daily  atorvastatin, 80 mg, Oral, Nightly  insulin lispro, 2-7 Units, Subcutaneous, Q6H  mupirocin, 1 Application, Each Nare, BID  senna-docusate sodium, 2 tablet, Oral, BID  sodium chloride, 10 mL, Intravenous, Q12H  sodium chloride, 10 mL, Intravenous, Q12H         Continuous Infusions:        Plan discussed with RN. Reviewed all other data in the last 24 hours, including but not limited to vitals, labs, microbiology, imaging and pertinent notes from other providers.  Plan also discussed with the patient and family at the bedside.      SHABANA Angelo   Critical Care  03/11/25   22:38 EDT

## 2025-03-12 NOTE — CONSULTS
"    [facility] MEDICINE SERVICE  TRANSFER OF CARE/ACCEPTANCE NOTE    PATIENT NAME: Lynda Patterson  : 1940  MRN: [id number]     Active Hospital Problems    Diagnosis  POA    **CVA (cerebral vascular accident) [I63.9]  Yes      Resolved Hospital Problems   No resolved problems to display.       Per HPI by SHABANA Barone on 3/11/2025:  \" Lynda Patterson is a 85 y.o. female with PMH of type 2 diabetes, CKD stage III, anxiety, GERD presented from an extended care facility to the hospital for sudden onset weakness and inability to speak around 12:30 PM. She has expressive aphasia and hemiparesis on the right side. Code stroke was called and consent was given for TNK which was given at 1539.  Dr. Wolff with neurology spoke with family and they denied wanting any surgical intervention but would like supportive care.  CT perfusion showed a significant delay in the left MCA distribution and CTA confirmed an M2 occlusion.  She will be closely monitored, repeat head CT 24 hours post TNK administration, and was admitted to the ICU with a principal diagnosis of CVA (cerebral vascular accident).  Labs remarkable for CO2 19.8, anion gap 16.2, creatinine 1.78, glucose 230, AST 40, WBC 12.09.\"    Patient was admitted to the ICU through 3/11/2025 for acute ischemic stroke status post tenecteplase.  MRI was done which revealed atrophy and chronic microvascular changes.  Echocardiogram was ordered which revealed.  Patient started on moderate to high statin intensity statins.  Neurology was following.  Patient improving back to baseline, noted to be less awake compared to admission.  Speech therapy evaluated patient, recommend n.p.o. with exception of Kareem price protocol.  Plan VFSS with speech therapy today.  Of note, patient's CODE STATUS was changed to DNR/DNI.  Pending formal PT/OT evaluation.  Patient stable for downgrade to PCU.     I have noted the following changes since admission.    I have " reviewed the H&P, diagnostic data and plan of care for Lynda Patterson.  Hospitalist service will be taking over care of this patient during the current hospitalization.        Signature: Electronically signed by Rach Modi PA-C, 03/12/25, 14:16 EDT.  [facility] Hospitalist Team         This is a nonbillable note**

## 2025-03-12 NOTE — PROGRESS NOTES
Critical Care Progress Note     Lynda Patterson : 1940 MRN:[id number] LOS:1  Rm: 2306/     Principal Problem: CVA (cerebral vascular accident)     Reason for follow up: All the medical problems listed below    Summary     Lynda Patterson is a 85 y.o. female with PMH of type 2 diabetes, CKD stage III, anxiety, GERD presented from an extended care facility to the hospital for sudden onset weakness and inability to speak around 12:30 PM. She has expressive aphasia and hemiparesis on the right side. Code stroke was called and consent was given for TNK which was given at 1539.  Dr. Wolff with neurology spoke with family and they denied wanting any surgical intervention but would like supportive care.  CT perfusion showed a significant delay in the left MCA distribution and CTA confirmed an M2 occlusion.  She will be closely monitored, repeat head CT 24 hours post TNK administration, and was admitted to the ICU with a principal diagnosis of CVA (cerebral vascular accident).  Labs remarkable for CO2 19.8, anion gap 16.2, creatinine 1.78, glucose 230, AST 40, WBC 12.09.     Patient is on Hospital Day: 2.    Significant Events / Subjective     25 : Downgrade to CALEB, hospitalist service consulted.  Planned swallow evaluation today.    Assessment / Plan     Acute ischemic stroke, M2 occlusion  Code stroke protocol initiated in ED, status post tenecteplase at 1539  Ischemic Stroke orders initiated.  CT scan reviewed: No intracranial hemorrhage. Generalized cerebral atrophy with moderate white matter findings of chronic microvascular disease.  CTA reviewed: Occlusion of the the anterior M2 branch of the left middle cerebral artery   CT perfusion study: true area of Tmax greater than 6 seconds involving the left MCA distribution   Repeat CT scan of head without, 24 hours post TNK administration  MRI  of brain: atrophy and chronic microvascular ischemic changes.  ECHO ordered  LDL: 94  and A1C:  6.08  Start moderate/high intensity statins.  Neurology consulted.  No antiplatelet or anticoagulant medication until 24 hours post-TNK administration  Observe for signs and symptoms of bleeding, if observed, immediately notify Neurology  NPO pending speech therapy evaluation, patient has a right facial droop   PT/ST/OT evaluation & treatment     CKD stage 3  Remains nonoliguric. Baseline creatinine 1.6  C/w IV fluids as ordered.  Monitor Input/Output very closely.   Avoids NSAIDs, nephrotoxic medications, and hypotension.     Diabetes mellitus Type 2, not insulin-dependent : well controlled.   Not on home insulin or oral agents  A1C 6.08  Accu checks every 6 hours with sliding scale coverage as needed.      Hyperlipidemia: lipid panel WNL, continue atorvastatin  Anxiety: continue buspar, bupropion, and sertraline when able to take PO    Disposition:  Downgrade to CALEB, hospitalist consulted.    Code status:   Code Status (Patient has no pulse and is not breathing): No CPR (Do Not Attempt to Resuscitate)  Medical Interventions (Patient has pulse or is breathing): Limited Support  Medical Intervention Limits: No intubation (DNI)  Level Of Support Discussed With: Health Care Surrogate       Nutrition:   Diet: Regular/House, Diabetic; Consistent Carbohydrate; No Straw; Texture: Pureed (NDD 1); Fluid Consistency: [place] Thick   Patient isn't on Tube Feeding     VTE Prophylaxis:  Mechanical VTE prophylaxis orders are present.       Objective / Physical Exam     Vital signs:  Temp: 97.1 °F (36.2 °C)  BP: 119/53  Heart Rate: 68  Resp: 16  SpO2: 96 %  Weight: 66.2 kg (146 lb)    Admission Weight: Weight: 66.5 kg (146 lb 9.7 oz)  Current Weight: Weight: 66.2 kg (146 lb)    Input/Output in last 24 hours:    Intake/Output Summary (Last 24 hours) at 3/12/2025 1325  Last data filed at 3/12/2025 0500  Gross per 24 hour   Intake --   Output 300 ml   Net -300 ml      Net IO Since Admission: -300 mL [03/12/25 1325]     Physical  Exam  Vitals and nursing note reviewed.   Constitutional:       General: She is not in acute distress.     Appearance: She is ill-appearing. She is not toxic-appearing or diaphoretic.   HENT:      Head: Normocephalic and atraumatic.      Nose: Nose normal.      Mouth/Throat:      Mouth: Mucous membranes are moist.      Pharynx: Oropharynx is clear.   Eyes:      Conjunctiva/sclera: Conjunctivae normal.   Cardiovascular:      Rate and Rhythm: Normal rate and regular rhythm.      Pulses: Normal pulses.      Heart sounds: Normal heart sounds.   Pulmonary:      Effort: Pulmonary effort is normal. No respiratory distress.   Abdominal:      General: There is no distension.   Musculoskeletal:      Right lower leg: No edema.      Left lower leg: No edema.   Skin:     General: Skin is warm and dry.   Neurological:      Comments: Waxes and wanes, less aphasic compared to admission.  Approaching patient's baseline.   Psychiatric:      Comments: Calm, cooperative.          Radiology and Labs     Results from last 7 days   Lab Units 03/12/25  0459 03/11/25  1522   WBC 10*3/mm3 8.97 12.09*   HEMOGLOBIN g/dL 13.4 14.0   HEMATOCRIT % 42.3 44.8   PLATELETS 10*3/mm3 177 221      Results from last 7 days   Lab Units 03/11/25  1522   PROTIME Seconds 14.8*   INR  1.16*   APTT seconds 29.1      Results from last 7 days   Lab Units 03/12/25  0459 03/11/25  1522   SODIUM mmol/L 148* 144   POTASSIUM mmol/L 3.5 3.9   CHLORIDE mmol/L 115* 108*   CO2 mmol/L 20.2* 19.8*   ANION GAP mmol/L 12.8 16.2*   BUN mg/dL 42* 50*   CREATININE mg/dL 1.51* 1.78*   GLUCOSE mg/dL 92 230*   PHOSPHORUS mg/dL 3.4  --    MAGNESIUM mg/dL 2.3  --    ALT (SGPT) U/L 20 27   AST (SGOT) U/L 34* 40*   ALK PHOS U/L 53 60      Results from last 7 days   Lab Units 03/12/25  0459 03/11/25  1522   ALT (SGPT) U/L 20 27   AST (SGOT) U/L 34* 40*   ALK PHOS U/L 53 60           MRI Brain Without Contrast  Result Date: 3/12/2025  Impression: Atrophy and chronic microvascular  ischemic change. No acute intracranial process. Electronically Signed: Mihir García MD  3/12/2025 12:16 AM EDT  Workstation ID: [id number]    XR Chest 1 View  Result Date: 3/11/2025  Impression: Loop recorder projects over the left lower chest. No radiographic evidence of an acute cardiopulmonary abnormality. Electronically Signed: Liang Key  3/11/2025 4:37 PM EDT  Workstation ID: [id number]    CT Angiogram Head w AI Analysis of LVO  Result Date: 3/11/2025  Impression: 1. No evidence of hemodynamically significant stenosis of the carotid or vertebral arteries. 2. Areas of irregularity with beaded appearance involving the distal bilateral internal carotid arteries and distal V2 segment of the left vertebral artery suggesting changes of fibromuscular dysplasia. 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 4. Occlusion of the the anterior M2 branch of the left middle cerebral artery. This would correspond to the perfusion abnormality. Electronically Signed: Bhaskar Kimble MD  3/11/2025 4:31 PM EDT  Workstation ID: [id number]    CT Angiogram Neck  Result Date: 3/11/2025  Impression: 1. No evidence of hemodynamically significant stenosis of the carotid or vertebral arteries. 2. Areas of irregularity with beaded appearance involving the distal bilateral internal carotid arteries and distal V2 segment of the left vertebral artery suggesting changes of fibromuscular dysplasia. 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 4. Occlusion of the the anterior M2 branch of the left middle cerebral artery. This would correspond to the perfusion abnormality. Electronically Signed: Bhaskar Kimble MD  3/11/2025 4:31 PM EDT  Workstation ID: [id number]    CT CEREBRAL PERFUSION WITH & WITHOUT CONTRAST  Result Date: 3/11/2025  1.motion-degraded study with true area of Tmax rater than 6 seconds involving the left MCA distribution. Precise volume is not quantifiable due to motion artifact. Within this region there is a 7 mL volume of cerebral blood  flow less than 30% compatible with a component of core infarct. Corresponding anterior M2 occlusion is noted on CTA. These findings were discussed with Dr. Cruz in the emergency department at 3:51 p.m. on 3/11/2025 Electronically Signed: Bhaskar Kimble MD  3/11/2025 3:51 PM EDT  Workstation ID: [id number]    CT Head Without Contrast Stroke Protocol  Result Date: 3/11/2025  Impression: 1. No intracranial hemorrhage. 2. Generalized cerebral atrophy with moderate white matter findings of chronic microvascular disease. 3. Left mastoid effusion. Electronically Signed: Hal Cee MD  3/11/2025 3:37 PM EDT  Workstation ID: [id number]      Current medications     Scheduled Meds:   aspirin, 325 mg, Oral, Daily   Or  aspirin, 300 mg, Rectal, Daily  atorvastatin, 80 mg, Oral, Nightly  [Held by provider] buPROPion XL, 150 mg, Oral, Daily  [Held by provider] busPIRone, 10 mg, Oral, TID  insulin lispro, 2-7 Units, Subcutaneous, Q6H  mupirocin, 1 Application, Each Nare, BID  senna-docusate sodium, 2 tablet, Oral, BID  [Held by provider] sertraline, 25 mg, Oral, Daily  sodium chloride, 10 mL, Intravenous, Q12H  sodium chloride, 10 mL, Intravenous, Q12H        Continuous Infusions:          Plan discussed with RN. Reviewed all other data in the last 24 hours, including but not limited to vitals, labs, microbiology, imaging and pertinent notes from other providers.  Plan also discussed with patient at the bedside.      SHABANA Westfall   Critical Care  03/12/25   13:25 EDT

## 2025-03-12 NOTE — ACP (ADVANCE CARE PLANNING)
Social Work Assessment  [facility]     Patient Name: Lynda Patterson  MRN: [id number]  Today's Date: 3/12/2025    Admit Date: 3/11/2025     Demographic Summary       Row Name 03/12/25 1316       General Information    Reason for Consult decision-making;health care directive    General Information Comments SW reviewed chart and noted pt has HCR on file that appoints her son and dtr as HCR. Son and DIL's contact info listed, dtr's is not. SW obtained contact info for dtr Shagufta from [facility] and updated chart.      SANDIE Ahumada, W  Medical Social Worker  Ph [phone]  Fax [phone]  [email or web address]

## 2025-03-12 NOTE — DISCHARGE PLACEMENT REQUEST
"Lynda Patterson E \"Jocelyne\" (85 y.o. Female)       Date of Birth   1940    Social Security Number       Address   [address] [place] [address] [place] [facility]    Home Phone   [phone]    MRN   [id number]       Mandaen   [place]    Marital Status                               Admission Date   3/11/2025    Admission Type   Emergency    Admitting Provider   Cheryl Major MD    Attending Provider   Martin Najera MD    Department, Room/Bed   [facility] INTENSIVE CARE UNIT, 2306/1       Discharge Date       Discharge Disposition       Discharge Destination                                 Attending Provider: Martin Najera MD    Allergies: Ciprofloxacin, Penicillin G, Sulfa Antibiotics    Isolation: Contact   Infection: Candida Auris (rule out) (03/11/25)   Code Status: No CPR    Ht: 149.9 cm (59\")   Wt: 66.2 kg (146 lb)    Admission Cmt: None   Principal Problem: CVA (cerebral vascular accident) [I63.9]                   Active Insurance as of 3/11/2025       Primary Coverage       Payor Plan Insurance Group Employer/Plan Group    ANTHEM MEDICARE REPLACEMENT ANTHEM MEDICARE ADVANTAGE PPO [id number]       Payor Plan Address Payor Plan Phone Number Payor Plan Fax Number Effective Dates    [address] [phone]  1/1/2021 - None Entered    [facility] [id number]         Subscriber Name Subscriber Birth Date Member ID       LYNDA PATTERSON 1940 [id number]                     Emergency Contacts        (Rel.) Home Phone Work Phone Mobile Phone    Allen Patterson (Son) [phone] -- --    LeonardoFranci (Relative) -- -- [phone]                "

## 2025-03-12 NOTE — SIGNIFICANT NOTE
03/12/25 0742   OTHER   Discipline physical therapist   Rehab Time/Intention   Session Not Performed unable to treat, medical status change  (Patient received TNK 3/11/25 153; will check back after 24 hours)   Therapy Assessment/Plan (PT)   Criteria for Skilled Interventions Met (PT) yes;meets criteria;skilled treatment is necessary   Recommendation   PT - Next Appointment 03/13/25   Recommendation   OT - Next Appointment 03/13/25

## 2025-03-12 NOTE — SIGNIFICANT NOTE
03/12/25 1613   OTHER   Discipline occupational therapist   Rehab Time/Intention   Session Not Performed other (see comments)  (Will evaluate pt 3/13/25 following 24 hr TNK window.)   Recommendation   OT - Next Appointment 03/13/25

## 2025-03-12 NOTE — PLAN OF CARE
Goal Outcome Evaluation:    VFSS completed. Pt was fed all trials by SLP respectively: NT by spoon x2, NT by cup x2, NT by straw, puree x2, mech soft x1, thin by spoon x2, thin by cup sequential sips x2, and esophageal scan to complete the study.      Labial closure incomplete resulting in anterior spillage x1. Reduced bolus formation and cohesion on mech soft. Reduced oral transit. Bolus head at the valleculae on puree, NT, and mech soft and to the pyriform sinus on thin. Min oral holding on liquid. Once initiated, it was timely. Complete epiglottic inversion and adequate vestibular closure on puree, mech soft, NT by spoon, NT by cup, NT by straw 1/2 trial. No penetration or aspiration observed. Material does not enter airway (a 1 on the Rosenbeck Penetration-Aspiration Scale). On 1/2 trial of NT by straw, incomplete vestibular closure resulting in transient penetration during the swallow. Penetrated material clears after subsequent swallow. Material enters the airway, remains above the vocal folds, and is ejected from the airway (a 2 on the Rosenbeck Penetration-Aspiration Scale). On thin by cup single and sequential sips, incomplete epiglottic inversion, and pt demonstrated deep penetration to and above the vocal folds during the swallow, and does not clear after subsequent swallow. Material enters the airway, contacts the vocal folds, and is not ejected from the airway  (a 5 on the Rosenbeck Penetration-Aspiration Scale). Min lingual and vallecular residue noted across most consistencies assessed. Esophageal scan revealed min esophageal retention in mid-esophagus.     Per objective findings, pt presents w/ mod oropharyngeal dysphagia. D/t reduced airway protection observed on thin liquid and oral dysphagia, recommend pt initiate puree and NTL, no straw, w/ Serna Water Protocol (see guidelines below).     Recommendations:  - Puree and nectar thick liquids (NTL), no straw, w/ Serna Water Protocol (see  guidelines below)  - Strict aspiration and reflux precautions   - Meds: whole or crushed in puree or NTL  - Safe swallow strategies: upright during all PO/meds, slow rate of intake, small bites/sips, alternate liquids and solids  - ST will continue to follow to ensure tolerance and safety of rec'd diet, and provide further recs as indicated     Water Protocol  The rationale to recommend water when a PO diet cannot appropriately/functionally sustain nutrition (or if thickened liquids are prescribed due to aspiration of thin liquids) is because water is low risk for aspiration pna when compared to aspiration of food or other liquids.    Benefits of a water protocol include but are not limited to:     Oral gratification  Engagement of oropharyngeal swallow musculature  Decrease likelihood of dehydration       Guidelines for proper implementation include:  Waiting a minimum of 30 minutes after consuming any other PO  Thorough oral care prior to consuming water  Upright at 90 degree hip flexion  Small sips at slow rate  Monitor for any changes in respiratory status and discontinue if distress noted     The [place] Free Water Protocol is a research based protocol established in 1984.                        SLP Swallowing Diagnosis: moderate, oral dysphagia, pharyngeal dysphagia (03/12/25 1300)

## 2025-03-12 NOTE — PLAN OF CARE
Problem: Adult Inpatient Plan of Care  Goal: Plan of Care Review  Outcome: Progressing  Flowsheets (Taken 3/12/2025 0323)  Progress: no change  Plan of Care Reviewed With: patient  Goal: Patient-Specific Goal (Individualized)  Outcome: Progressing  Goal: Absence of Hospital-Acquired Illness or Injury  Outcome: Progressing  Goal: Optimal Comfort and Wellbeing  Outcome: Progressing  Goal: Readiness for Transition of Care  Outcome: Progressing     Problem: Fall Injury Risk  Goal: Absence of Fall and Fall-Related Injury  Outcome: Progressing     Problem: Stroke, Ischemic (Includes Transient Ischemic Attack)  Goal: Optimal Coping  Outcome: Progressing  Goal: Effective Bowel Elimination  Outcome: Progressing  Goal: Optimal Cerebral Tissue Perfusion  Outcome: Progressing  Goal: Optimal Cognitive Function  Outcome: Progressing  Goal: Improved Communication Skills  Outcome: Progressing  Goal: Optimal Functional Ability  Outcome: Progressing  Goal: Optimal Nutrition Intake  Outcome: Progressing  Goal: Effective Oxygenation and Ventilation  Outcome: Progressing  Goal: Improved Sensorimotor Function  Outcome: Progressing  Goal: Safe and Effective Swallow  Outcome: Progressing  Goal: Effective Urinary Elimination  Outcome: Progressing     Problem: Skin Injury Risk Increased  Goal: Skin Health and Integrity  Outcome: Progressing   Goal Outcome Evaluation:  Plan of Care Reviewed With: patient        Progress: no change

## 2025-03-12 NOTE — PLAN OF CARE
Goal Outcome Evaluation:  Plan of Care Reviewed With: patient  Outcome Evaluation: patient oriented x 1-2. Dementia at baseline per family. intermittently follows motor commands. Repeat CT for 24 hour post TNK completed.

## 2025-03-12 NOTE — PLAN OF CARE
"Goal Outcome Evaluation:    Clinical swallow evaluation completed.  Pt was alert, fatigued, and amenable to PO trials. Family at bedside reported the following: d/t concern for aspiration PNA, pt underwent VFSS 8-9 months ago at [facility] and was subsequently rec'd puree/thins, and has been on this diet ever since. Pt allowed for bed to be elevated prior to PO acceptance. Oral mech revealed natural dentition and generalized oral motor weakness. She was provided ice chip trials x2, water by spoon x1, water by cup x1, water by straw x1, puree x2. Slow and disorganized manipulation on ice chips noted. Min oral holding noted on thin. Reduced oral transit on puree. After the swallow, pt demonstrated \"wet\" cough response on ice chip and water by straw.  Per overt s/s of aspiration observed on thin, h/o PNA, and modified diet, VFSS is indicated to r/o pharyngeal impairment and to further assess swallow function. Recommend strict NPO w/ exception of Serna Water Protocol (see guidelines below) - pt is able to consume single small ice chips when pt is awake/alert, sitting upright, and resp status is stable. Recommend frequent oral care, oral care prior to providing ice chips. Plan for VFSS today w/ recs to follow. Pt, family, and nsg all in agreement w/ plan.      SLP Plan & Recommendation:  - NPO w/ exception of Serna Water Protocol (see guidelines below)  - Meds: via alternate route   - Pt is able to consume single small ice chips when pt is awake/alert, sitting upright, and resp status is stable. Recommend frequent oral care, oral care prior to providing ice chips.    - Plan for VFSS today w/ recs to follow  - Pt, family, and nsg all in agreement w/ plan      Water Protocol:  The rationale to recommend water when a PO diet cannot appropriately/functionally sustain nutrition is because water is low risk for aspiration pna when compared to aspiration of food or other liquids.    Benefits of a water protocol include but are not " limited to:     Oral gratification  Engagement of oropharyngeal swallow musculature  Decrease likelihood of dehydration       Guidelines for proper implementation include:  Thorough oral care prior to consuming water  Upright at 90 degree hip flexion  Small sips at slow rate  Monitor for any changes in respiratory status and discontinue if distress noted     The Serna Free Water Protocol is a research-based protocol established in 1984.                         SLP Swallowing Diagnosis: moderate, oral dysphagia, suspected pharyngeal dysphagia, R/O pharyngeal dysphagia (03/12/25 4375)

## 2025-03-12 NOTE — MBS/VFSS/FEES
Acute Care - Speech Language Pathology   Swallow Initial Evaluation  [facility]     Patient Name: Lynda Patterson  : 1940  MRN: [id number]  Today's Date: 3/12/2025               Admit Date: 3/11/2025    Visit Dx:     ICD-10-CM ICD-9-CM   1. Acute CVA (cerebrovascular accident)  I63.9 434.91     Patient Active Problem List   Diagnosis    Mixed hyperlipidemia    Macrocytic anemia    Type 2 diabetes mellitus with stage 3 chronic kidney disease, without long-term current use of insulin    Acute low back pain    Anxiety disorder    Carotid artery stenosis    Chronic kidney disease, stage III (moderate)    Depressive disorder    Disorder associated with type 2 diabetes mellitus    Encounter for general adult medical examination without abnormal findings    Fatigue    Gastroesophageal reflux disease    Gout    Hyperkalemia    Hypertension    Osteoarthritis    Postherpetic neuralgia    Shingles    Urinary incontinence    Vitamin D deficiency    Low back pain    Essential (primary) hypertension    Type 2 diabetes mellitus with diabetic chronic kidney disease    Degenerative drusen    Hypermetropia    Pseudophakia    Sprain of left ankle, initial encounter    Nondisplaced fracture of proximal end of left fibula    Encounter for general adult medical examination with abnormal findings    Obesity (BMI 30-39.9)    Splenic cyst    Status post placement of implantable loop recorder    Syncope    CVA (cerebral vascular accident)     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    Macular degeneration     Mild cognitive impairment of uncertain or unknown etiology     Muscle weakness  (generalized)     Nondisplaced fracture of proximal end of left fibula 09/03/2021    Reduced mobility     Repeated falls     Sprain of left ankle, initial encounter 08/20/2021    Syncope     Urinary tract infection     Vitamin D deficiency      Past Surgical History:   Procedure Laterality Date    CHOLECYSTECTOMY  1988    TOTAL ABDOMINAL HYSTERECTOMY  1970       SLP Recommendation and Plan  SLP Swallowing Diagnosis: moderate, oral dysphagia, pharyngeal dysphagia (03/12/25 1300)  SLP Diet Recommendation: NPO (03/12/25 1300)     SLP Rec. for Method of Medication Administration: meds via alternate route (03/12/25 1300)     Monitor for Signs of Aspiration: yes, notify SLP if any concerns (03/12/25 1300)  Recommended Diagnostics: VFSS ([facility] [place]) (03/12/25 1300)  Swallow Criteria for Skilled Therapeutic Interventions Met: demonstrates skilled criteria (03/12/25 1300)     Rehab Potential/Prognosis, Swallowing: good, to achieve stated therapy goals, adequate, monitor progress closely (03/12/25 1300)  Therapy Frequency (Swallow): 3 days per week, 4 days per week (03/12/25 1300)  Predicted Duration Therapy Intervention (Days): until discharge (03/12/25 1300)  Oral Care Recommendations: Oral Care BID/PRN, Before ice/water (03/12/25 1300)     SWALLOW EVALUATION (Last 72 Hours)       SLP Adult Swallow Evaluation       Row Name 03/12/25 1300       Rehab Evaluation    Document Type evaluation  -PF    Subjective Information no complaints  -PF    Patient Observations alert;cooperative  -PF    Patient Effort good  -PF    Symptoms Noted During/After Treatment none  -PF       General Information    Patient Profile Reviewed yes  -PF    Current Method of Nutrition NPO  -PF    Precautions/Limitations, Vision WFL;for purposes of eval  -PF    Precautions/Limitations, Hearing WFL;for purposes of eval  -PF    Prior Level of Function-Communication WFL  -PF    Prior Level of Function-Swallowing puree;thin liquids  -PF    Plans/Goals Discussed with  patient;family  -PF       Pain    Pretreatment Pain Rating 0/10 - no pain  -PF    Posttreatment Pain Rating 0/10 - no pain  -PF       Oral Motor Structure and Function    Dentition Assessment natural, present and adequate  -PF    Secretion Management WNL/WFL  -PF    Mucosal Quality dry  -PF       Oral Musculature and Cranial Nerve Assessment    Oral Motor General Assessment generalized oral motor weakness  -PF       General Eating/Swallowing Observations    Respiratory Support Currently in Use room air  -PF    Eating/Swallowing Skills self-fed;fed by SLP;needed assist  -PF    Positioning During Eating upright 90 degree;upright in bed  -PF    Utensils Used spoon;cup  -PF    Consistencies Trialed ice chips;thin liquids;pureed  -PF       MBS/VFSS    Utensils Used spoon;cup;straw  -PF    Consistencies Trialed nectar/syrup-thick liquids;thin liquids;pureed;mixed consistency;mechanical ground textures  -PF       MBS/VFSS Interpretation    VFSS Summary VFSS completed. Pt was fed all trials by SLP respectively: NT by spoon x2, NT by cup x2, NT by straw, puree x2, mech soft x1, thin by spoon x2, thin by cup sequential sips x2, and esophageal scan to complete the study.      Labial closure incomplete resulting in anterior spillage x1. Reduced bolus formation and cohesion on mech soft. Reduced oral transit. Bolus head at the valleculae on puree, NT, and mech soft and to the pyriform sinus on thin. Min oral holding on liquid. Once initiated, it was timely. Complete epiglottic inversion and adequate vestibular closure on puree, mech soft, NT by spoon, NT by cup, NT by straw 1/2 trial. No penetration or aspiration observed. Material does not enter airway (a 1 on the [place] Penetration-Aspiration Scale). On 1/2 trial of NT by straw, incomplete vestibular closure resulting in transient penetration during the swallow. Penetrated material clears after subsequent swallow. Material enters the airway, remains above the vocal folds,  and is ejected from the airway (a 2 on the Rosenbeck Penetration-Aspiration Scale). On thin by cup single and sequential sips, incomplete epiglottic inversion, and pt demonstrated deep penetration to and above the vocal folds during the swallow, and does not clear after subsequent swallow. Material enters the airway, contacts the vocal folds, and is not ejected from the airway  (a 5 on the Rosenbeck Penetration-Aspiration Scale). Min lingual and vallecular residue noted across most consistencies assessed. Esophageal scan revealed min esophageal retention in mid-esophagus.     Per objective findings, pt presents w/ mod oropharyngeal dysphagia. D/t reduced airway protection observed on thin liquid and oral dysphagia, recommend pt initiate puree and NTL, no straw, w/ Serna Water Protocol (see guidelines below).     Recommendations:  - Puree and nectar thick liquids (NTL), no straw, w/ Serna Water Protocol (see guidelines below)  - Strict aspiration and reflux precautions   - Meds: whole or crushed in puree or NTL  - Safe swallow strategies: upright during all PO/meds, slow rate of intake, small bites/sips, alternate liquids and solids  - ST will continue to follow to ensure tolerance and safety of rec'd diet, and provide further recs as indicated     Water Protocol  The rationale to recommend water when a PO diet cannot appropriately/functionally sustain nutrition (or if thickened liquids are prescribed due to aspiration of thin liquids) is because water is low risk for aspiration pna when compared to aspiration of food or other liquids.    Benefits of a water protocol include but are not limited to:     Oral gratification  Engagement of oropharyngeal swallow musculature  Decrease likelihood of dehydration       Guidelines for proper implementation include:  Waiting a minimum of 30 minutes after consuming any other PO  Thorough oral care prior to consuming water  Upright at 90 degree hip flexion  Small sips at slow  rate  Monitor for any changes in respiratory status and discontinue if distress noted     The Serna Free Water Protocol is a research based protocol established in 1984.      Deep penetration to/above the vocal folds on thin by cup      Esophageal retention        -PF       SLP Evaluation Clinical Impression    SLP Swallowing Diagnosis moderate;oral dysphagia;pharyngeal dysphagia  -PF    Functional Impact risk of aspiration/pneumonia;risk of malnutrition;risk of dehydration  -PF    Rehab Potential/Prognosis, Swallowing good, to achieve stated therapy goals;adequate, monitor progress closely  -PF    Swallow Criteria for Skilled Therapeutic Interventions Met demonstrates skilled criteria  -PF       Recommendations    Therapy Frequency (Swallow) 3 days per week;4 days per week  -PF    Predicted Duration Therapy Intervention (Days) until discharge  -PF    SLP Diet Recommendation NPO  -PF    Recommended Diagnostics VFSS (MBS)  -PF    Oral Care Recommendations Oral Care BID/PRN;Before ice/water  -PF    SLP Rec. for Method of Medication Administration meds via alternate route  -PF    Monitor for Signs of Aspiration yes;notify SLP if any concerns  -PF       Swallow Goals (SLP)    Swallow LTGs Swallow Long Term Goal (free text)  -PF    Swallow STGs diet tolerance goal selection (SLP)  -PF    Diet Tolerance Goal Selection (SLP) Swallow Short Term Goal 1;Swallow Short Term Goal 2  -PF       (LTG) Swallow    (LTG) Swallow Pt will maximize swallow function for least restrictive PO diet, exhibiting no complication associated with dysphagia, adequate PO intake, and demonstrating independent use of swallow compensation.  -PF    [place] (Swallow Long Term Goal) with minimal cues (75-90% accuracy)  -PF    Time Frame (Swallow Long Term Goal) by discharge  -PF    Progress/Outcomes (Swallow Long Term Goal) new goal  -PF       (STG) Swallow 1    (STG) Swallow 1 The patient will participate in a full meal assessment to determine  safety and adequacy of recommended diet, independent use of safe swallow compensations, and additional goals/recommendations to follow.  -PF    [place] (Swallow Short Term Goal 1) with minimal cues (75-90% accuracy)  -PF    Time Frame (Swallow Short Term Goal 1) 1 week  -PF    Progress/Outcomes (Swallow Short Term Goal 1) new goal  -PF              User Key  (r) = Recorded By, (t) = Taken By, (c) = Cosigned By      Initials Name Effective Dates    PF Zakiya Gongora SLP 05/08/23 -                EDUCATION  The patient has been educated in the following areas:   Dysphagia (Swallowing Impairment) Oral Care/Hydration Modified Diet Instruction.        SLP GOALS       Row Name 03/12/25 1300       (LTG) Swallow    (LTG) Swallow Pt will maximize swallow function for least restrictive PO diet, exhibiting no complication associated with dysphagia, adequate PO intake, and demonstrating independent use of swallow compensation.  -PF    [place] (Swallow Long Term Goal) with minimal cues (75-90% accuracy)  -PF    Time Frame (Swallow Long Term Goal) by discharge  -PF    Progress/Outcomes (Swallow Long Term Goal) new goal  -PF       (STG) Swallow 1    (STG) Swallow 1 The patient will participate in a full meal assessment to determine safety and adequacy of recommended diet, independent use of safe swallow compensations, and additional goals/recommendations to follow.  -PF    [place] (Swallow Short Term Goal 1) with minimal cues (75-90% accuracy)  -PF    Time Frame (Swallow Short Term Goal 1) 1 week  -PF    Progress/Outcomes (Swallow Short Term Goal 1) new goal  -PF              User Key  (r) = Recorded By, (t) = Taken By, (c) = Cosigned By      Initials Name Provider Type    PF Fakto, [place], SLP Speech and Language Pathologist               GURJIT Rosenberg  3/12/2025

## 2025-03-12 NOTE — THERAPY EVALUATION
Acute Care - Speech Language Pathology   Swallow Initial Evaluation  [facility]     Patient Name: Lynda Patterson  : 1940  MRN: [id number]  Today's Date: 3/12/2025               Admit Date: 3/11/2025    Visit Dx:     ICD-10-CM ICD-9-CM   1. Acute CVA (cerebrovascular accident)  I63.9 434.91     Patient Active Problem List   Diagnosis    Mixed hyperlipidemia    Macrocytic anemia    Type 2 diabetes mellitus with stage 3 chronic kidney disease, without long-term current use of insulin    Acute low back pain    Anxiety disorder    Carotid artery stenosis    Chronic kidney disease, stage III (moderate)    Depressive disorder    Disorder associated with type 2 diabetes mellitus    Encounter for general adult medical examination without abnormal findings    Fatigue    Gastroesophageal reflux disease    Gout    Hyperkalemia    Hypertension    Osteoarthritis    Postherpetic neuralgia    Shingles    Urinary incontinence    Vitamin D deficiency    Low back pain    Essential (primary) hypertension    Type 2 diabetes mellitus with diabetic chronic kidney disease    Degenerative drusen    Hypermetropia    Pseudophakia    Sprain of left ankle, initial encounter    Nondisplaced fracture of proximal end of left fibula    Encounter for general adult medical examination with abnormal findings    Obesity (BMI 30-39.9)    Splenic cyst    Status post placement of implantable loop recorder    Syncope    CVA (cerebral vascular accident)     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    Macular degeneration     Mild cognitive impairment of uncertain or unknown etiology     Muscle weakness  (generalized)     Nondisplaced fracture of proximal end of left fibula 09/03/2021    Reduced mobility     Repeated falls     Sprain of left ankle, initial encounter 08/20/2021    Syncope     Urinary tract infection     Vitamin D deficiency      Past Surgical History:   Procedure Laterality Date    CHOLECYSTECTOMY  1988    TOTAL ABDOMINAL HYSTERECTOMY  1970     SLP Recommendation and Plan  SLP Swallowing Diagnosis: moderate, oral dysphagia, suspected pharyngeal dysphagia, R/O pharyngeal dysphagia (03/12/25 0845)  SLP Diet Recommendation: NPO (03/12/25 0845)     SLP Rec. for Method of Medication Administration: meds via alternate route (03/12/25 0845)     Monitor for Signs of Aspiration: yes, notify SLP if any concerns (03/12/25 0845)  Recommended Diagnostics: VFSS (MBS) (03/12/25 0845)  Swallow Criteria for Skilled Therapeutic Interventions Met: demonstrates skilled criteria (03/12/25 0845)     Rehab Potential/Prognosis, Swallowing: good, to achieve stated therapy goals, adequate, monitor progress closely (03/12/25 0845)  Therapy Frequency (Swallow): 3 days per week, 4 days per week (03/12/25 0845)  Predicted Duration Therapy Intervention (Days): until discharge (03/12/25 0845)  Oral Care Recommendations: Oral Care BID/PRN, Before ice/water (03/12/25 0845)     SWALLOW EVALUATION (Last 72 Hours)       SLP Adult Swallow Evaluation       Row Name 03/12/25 0845       Rehab Evaluation    Document Type evaluation  -PF    Subjective Information no complaints  -PF    Patient Observations cooperative;alert  -PF    Patient Effort good  -PF    Symptoms Noted During/After Treatment none  -PF       General Information    Patient Profile Reviewed yes  -PF    Pertinent History Of Current Problem Per H&P: a 85-year-old female who has multiple health problems who was last known normal was about 12:30 PM she had just had been with the family that she was found on the floor in the room shortly afterwards blood sugar was low per the  staff she was given IM glucagon EMS was called upon arrival the patient's blood sugar was 170 with a transport to the hospital hemodynamically stable and route. Patient is unable to speak and give me any history. They noticed right-sided paralysis and unable to speak. Received order per stroke protocol. Pt is currently NPO at the time of evaluation. -PF    Current Method of Nutrition NPO  -PF    Precautions/Limitations, Vision WFL;for purposes of eval  -PF    Precautions/Limitations, Hearing WFL;for purposes of eval  -PF    Prior Level of Function-Communication WFL  -PF    Prior Level of Function-Swallowing puree;thin liquids  -PF    Plans/Goals Discussed with patient;family  -PF       Pain    Pretreatment Pain Rating 0/10 - no pain  -PF    Posttreatment Pain Rating 0/10 - no pain  -PF       Oral Motor Structure and Function    Dentition Assessment natural, present and adequate  -PF    Secretion Management WNL/WFL  -PF    Mucosal Quality dry  -PF       Oral Musculature and Cranial Nerve Assessment    Oral Motor General Assessment generalized oral motor weakness  -PF       General Eating/Swallowing Observations    Respiratory Support Currently in Use room air  -PF    Eating/Swallowing Skills self-fed;fed by SLP;needed assist  -PF    Positioning During Eating upright 90 degree;upright in bed  -PF    Utensils Used spoon;cup  -PF    Consistencies Trialed ice chips;thin liquids;pureed  -PF       Clinical Swallow Eval    Clinical Swallow Evaluation Summary Clinical swallow evaluation completed.  Pt was alert, fatigued, and amenable to PO trials. Family at bedside reported the following: d/t concern for aspiration PNA, pt underwent VFSS 8-9 months ago at SNF and was subsequently rec'd puree/thins, and has been on this diet ever since. Pt allowed for bed to be elevated prior to PO acceptance. Oral mech revealed natural dentition and generalized oral motor weakness. She was provided ice chip trials x2, water by spoon x1,  "water by cup x1, water by straw x1, puree x2. Slow and disorganized manipulation on ice chips noted. Min oral holding noted on thin. Reduced oral transit on puree. After the swallow, pt demonstrated \"wet\" cough response on ice chip and water by straw.  Per overt s/s of aspiration observed on thin, h/o PNA, and modified diet, VFSS is indicated to r/o pharyngeal impairment and to further assess swallow function. Recommend strict NPO w/ exception of Serna Water Protocol (see guidelines below) - pt is able to consume single small ice chips when pt is awake/alert, sitting upright, and resp status is stable. Recommend frequent oral care, oral care prior to providing ice chips. Plan for VFSS today w/ recs to follow. Pt, family, and nsg all in agreement w/ plan.      SLP Plan & Recommendation:  - NPO w/ exception of Serna Water Protocol (see guidelines below)  - Meds: via alternate route   - Pt is able to consume single small ice chips when pt is awake/alert, sitting upright, and resp status is stable. Recommend frequent oral care, oral care prior to providing ice chips.    - Plan for VFSS today w/ recs to follow  - Pt, family, and nsg all in agreement w/ plan      Water Protocol:  The rationale to recommend water when a PO diet cannot appropriately/functionally sustain nutrition is because water is low risk for aspiration pna when compared to aspiration of food or other liquids.    Benefits of a water protocol include but are not limited to:     Oral gratification  Engagement of oropharyngeal swallow musculature  Decrease likelihood of dehydration       Guidelines for proper implementation include:  Thorough oral care prior to consuming water  Upright at 90 degree hip flexion  Small sips at slow rate  Monitor for any changes in respiratory status and discontinue if distress noted     The Serna Free Water Protocol is a research-based protocol established in 1984.  -PF       SLP Evaluation Clinical Impression    SLP " Swallowing Diagnosis mild-moderate;oral dysphagia;suspected pharyngeal dysphagia;R/O pharyngeal dysphagia  -PF    Functional Impact risk of aspiration/pneumonia;risk of malnutrition;risk of dehydration  -PF    Rehab Potential/Prognosis, Swallowing good, to achieve stated therapy goals;adequate, monitor progress closely  -PF    Swallow Criteria for Skilled Therapeutic Interventions Met demonstrates skilled criteria  -PF       Recommendations    Therapy Frequency (Swallow) 3 days per week;4 days per week  -PF    Predicted Duration Therapy Intervention (Days) until discharge  -PF    SLP Diet Recommendation NPO  -PF    Recommended Diagnostics VFSS (MBS)  -PF    Oral Care Recommendations Oral Care BID/PRN;Before ice/water  -PF    SLP Rec. for Method of Medication Administration meds via alternate route  -PF    Monitor for Signs of Aspiration yes;notify SLP if any concerns  -PF       Swallow Goals (SLP)    Swallow LTGs Swallow Long Term Goal (free text)  -PF    Swallow STGs diet tolerance goal selection (SLP)  -PF    Diet Tolerance Goal Selection (SLP) Swallow Short Term Goal 1;Swallow Short Term Goal 2  -PF              User Key  (r) = Recorded By, (t) = Taken By, (c) = Cosigned By      Initials Name Effective Dates    PF Zakiya Gongora SLP 05/08/23 -                EDUCATION  The patient has been educated in the following areas:   Dysphagia (Swallowing Impairment) Oral Care/Hydration NPO rationale.       GURJIT Rosenberg  3/12/2025

## 2025-03-12 NOTE — PROGRESS NOTES
LOS: 1 day     Chief Complaint: Speech problem and right-sided weakness       SUBJECTIVE:    History taken from: chart family RN    Interval History: Lynda Patterson was admitted on 3/11/2025   She was seen in the ER in CT suite and given tenecteplase because of suspected stroke    She has remarkably recovered and improved she may be at baseline because her baseline but does have significant cognition issues    Her MRI brain was unremarkable for any new stroke    Vital signs are stable    BUN 42, creatinine 1.51, sodium 148    CBC was unremarkable    Hemoglobin A1c 6.08 LDL 94    - Portions of the above HPI were copied from previous encounters and edited as appropriate.    Patient Complaints: None      Review of Systems   Review of system very questionable considering her cognition    Pertinent PMH:  has a past medical history of Anxiety, Cognitive communication deficit, Dementia, Depression, Diabetic neuropathy, Difficulty walking, DM2 (diabetes mellitus, type 2), GERD (gastroesophageal reflux disease), Gout, Heart disease, Hyperlipidemia, Hypertension, Kidney failure, Macular degeneration, Mild cognitive impairment of uncertain or unknown etiology, Muscle weakness (generalized), Nondisplaced fracture of proximal end of left fibula (09/03/2021), Reduced mobility, Repeated falls, Sprain of left ankle, initial encounter (08/20/2021), Syncope, Urinary tract infection, and Vitamin D deficiency.   ________________________________________________     OBJECTIVE:  Patient resting comfortably in bed in no acute distress      The patient is awake and alert and oriented x self  Not really oriented to time or place  She can name most objects and repeat       Cranial nerve examination demonstrate:  Full fields of vision to confrontation  Pupils are round, reactive to light and accommodation and size of about 3 mm  No ptosis or nystagmus  Funduscopic examination was not successful  Eye movements are conjugate      Sensation on the face and scalp are normal  Muscles of mastication are normal and symmetric  Muscles of  facial expression are normal and symmetric  Hearing is intact bilaterally  Head turning and shoulder shrugs were unremarkable  Tongue was midline  I could not visualize  oropharynx or uvula     Motor examination:  Normal bulk, tone and strength was 4+ to 5 - /5  No fasciculations     Sensory examination:  Intact for soft touch, pain   Romberg was not evaluated     Reflexes:  0/4     Coordination:  She tried finger-to-nose to finger but really could not do much on the right  Gait:  Deferred     Toe signs:  Mute      NIH Stroke Scale  Interval: baseline (shift change)  1a. Level of Consciousness: 1-->Not alert, but arousable by minor stimulation to obey, answer, or respond  1b. LOC Questions: 2-->Answers neither question correctly  1c. LOC Commands: 0-->Performs both tasks correctly  2. Best Gaze: 0-->Normal  3. Visual: 1-->Partial hemianopia  4. Facial Palsy: 0-->Normal symmetrical movements  5a. Motor Arm, Left: 0-->No drift, limb holds 90 (or 45) degrees for full 10 secs  5b. Motor Arm, Right: 0-->No drift, limb holds 90 (or 45) degrees for full 10 secs  6a. Motor Leg, Left: 0-->No drift, leg holds 30 degree position for full 5 secs  6b. Motor Leg, Right: 1-->Drift, leg falls by the end of the 5-sec period but does not hit bed  7. Limb Ataxia: 1-->Present in one limb  8. Sensory: 1-->Mild-to-moderate sensory loss, patient feels pinprick is less sharp or is dull on the affected side, or there is a loss of superficial pain with pinprick, but patient is aware of being touched  9. Best Language: 1-->Mild-to-moderate aphasia, some obvious loss of fluency or facility of comprehension, without significant limitation on ideas expressed or form of expression. Reduction of speech and/or comprehension, however, makes conversation. . . (see row details)  10. Dysarthria: 0-->Normal  11. Extinction and Inattention (formerly  Neglect): 0-->No abnormality  Total (NIH Stroke Scale): 8      Modified Hansford Scale  Modified Camille Scale: 4 - Moderately severe disability.  Unable to walk without assistance, and unable to attend to own bodily needs without assistance.         ________________________________________________   RESULTS REVIEW    VITAL SIGNS:  Temp:  [97.1 °F (36.2 °C)-98.9 °F (37.2 °C)] 97.1 °F (36.2 °C)  Heart Rate:  [59-82] 68  Resp:  [12-20] 16  BP: ()/(42-83) 119/53    LABS:       Lab 03/12/25  0459 03/11/25  1522   WBC 8.97 12.09*   HEMOGLOBIN 13.4 14.0   HEMATOCRIT 42.3 44.8   PLATELETS 177 221   NEUTROS ABS 5.87 9.68*   IMMATURE GRANS (ABS) 0.03 0.04   LYMPHS ABS 1.78 1.12   MONOS ABS 1.26* 1.23*   EOS ABS 0.01 0.00   MCV 99.1* 98.7*   PROTIME  --  14.8*   APTT  --  29.1         Lab 03/12/25  0459 03/11/25  1522   SODIUM 148* 144   POTASSIUM 3.5 3.9   CHLORIDE 115* 108*   CO2 20.2* 19.8*   ANION GAP 12.8 16.2*   BUN 42* 50*   CREATININE 1.51* 1.78*   EGFR 33.3* 27.4*   GLUCOSE 92 230*   CALCIUM 8.5* 9.1   MAGNESIUM 2.3  --    PHOSPHORUS 3.4  --    HEMOGLOBIN A1C  --  6.08*   TSH  --  3.190         Lab 03/12/25  0459 03/11/25  1522   TOTAL PROTEIN 6.5 7.2   ALBUMIN 3.5 3.8   GLOBULIN 3.0 3.4   ALT (SGPT) 20 27   AST (SGOT) 34* 40*   BILIRUBIN 0.3 0.4   ALK PHOS 53 60         Lab 03/11/25  1522   PROTIME 14.8*   INR 1.16*         Lab 03/11/25  1522   CHOLESTEROL 166   LDL CHOL 94   HDL CHOL 54   TRIGLYCERIDES 100         Lab 03/11/25  1558 03/11/25  1522   FOLATE  --  5.15   VITAMIN B 12  --  399   ABO TYPING A  --    RH TYPING Negative  --    ANTIBODY SCREEN Negative  --              Lab Results   Component Value Date    TSH 3.190 03/11/2025    LDL 94 03/11/2025    HGBA1C 6.08 (H) 03/11/2025    MCHSAHHC91 399 03/11/2025         IMAGING STUDIES:  MRI Brain Without Contrast  Result Date: 3/12/2025  Impression: Atrophy and chronic microvascular ischemic change. No acute intracranial process. Electronically Signed: Mihir  MD Ricky  3/12/2025 12:16 AM EDT  Workstation ID: [id number]    XR Chest 1 View  Result Date: 3/11/2025  Impression: Loop recorder projects over the left lower chest. No radiographic evidence of an acute cardiopulmonary abnormality. Electronically Signed: Liang Key  3/11/2025 4:37 PM EDT  Workstation ID: [id number]    CT Angiogram Head w AI Analysis of LVO  Result Date: 3/11/2025  Impression: 1. No evidence of hemodynamically significant stenosis of the carotid or vertebral arteries. 2. Areas of irregularity with beaded appearance involving the distal bilateral internal carotid arteries and distal V2 segment of the left vertebral artery suggesting changes of fibromuscular dysplasia. 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 4. Occlusion of the the anterior M2 branch of the left middle cerebral artery. This would correspond to the perfusion abnormality. Electronically Signed: Bhaskar Kimble MD  3/11/2025 4:31 PM EDT  Workstation ID: [id number]    CT Angiogram Neck  Result Date: 3/11/2025  Impression: 1. No evidence of hemodynamically significant stenosis of the carotid or vertebral arteries. 2. Areas of irregularity with beaded appearance involving the distal bilateral internal carotid arteries and distal V2 segment of the left vertebral artery suggesting changes of fibromuscular dysplasia. 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 4. Occlusion of the the anterior M2 branch of the left middle cerebral artery. This would correspond to the perfusion abnormality. Electronically Signed: Bhaskar Kimble MD  3/11/2025 4:31 PM EDT  Workstation ID: [id number]    CT CEREBRAL PERFUSION WITH & WITHOUT CONTRAST  Result Date: 3/11/2025  1.motion-degraded study with true area of Tmax rater than 6 seconds involving the left MCA distribution. Precise volume is not quantifiable due to motion artifact. Within this region there is a 7 mL volume of cerebral blood flow less than 30% compatible with a component of core infarct.  Corresponding anterior M2 occlusion is noted on CTA. These findings were discussed with Dr. Cruz in the emergency department at 3:51 p.m. on 3/11/2025 Electronically Signed: Bhaskar Kimble MD  3/11/2025 3:51 PM EDT  Workstation ID: [id number]    CT Head Without Contrast Stroke Protocol  Result Date: 3/11/2025  Impression: 1. No intracranial hemorrhage. 2. Generalized cerebral atrophy with moderate white matter findings of chronic microvascular disease. 3. Left mastoid effusion. Electronically Signed: Hal Cee MD  3/11/2025 3:37 PM EDT  Workstation ID: [id number]      I reviewed the patient's new clinical results.    ________________________________________________      PROBLEM LIST:    CVA (cerebral vascular accident)        ASSESSMENT/PLAN:  Likely aborted stroke, post tenecteplase  No new stroke  He may be back to her baseline    Per neurology nothing else to change except add aspirin and increase Lipitor to 80    Modification of stroke risk factors:   - Blood pressure should be less than 130/80 outpatient, HbA1c less than 6.5, LDL less than 70; b12>500 and smoking cessation if applicable. We would be grateful if the primary team / primary care physician keep a close watch on this above targets.  - Stroke education  - Follow up with neurologist of choice      Rehab or going back to her place would be fine per neurology    Call if needed    **Please refer to previous notes for further details and recommendations.     I discussed the patient's findings and my recommendations with patient, family, nursing staff, and primary care team    Sanjana Harp MD  03/12/25  13:40 EDT

## 2025-03-13 LAB
ALBUMIN SERPL-MCNC: 3.6 G/DL (ref 3.5–5.2)
ALBUMIN/GLOB SERPL: 1.1 G/DL
ALP SERPL-CCNC: 56 U/L (ref 39–117)
ALT SERPL W P-5'-P-CCNC: 17 U/L (ref 1–33)
ANION GAP SERPL CALCULATED.3IONS-SCNC: 12 MMOL/L (ref 5–15)
AST SERPL-CCNC: 30 U/L (ref 1–32)
BASOPHILS # BLD AUTO: 0.01 10*3/MM3 (ref 0–0.2)
BASOPHILS NFR BLD AUTO: 0.1 % (ref 0–1.5)
BILIRUB SERPL-MCNC: 0.4 MG/DL (ref 0–1.2)
BUN SERPL-MCNC: 42 MG/DL (ref 8–23)
BUN/CREAT SERPL: 25.6 (ref 7–25)
C AURIS DNA SPEC QL NAA+NON-PROBE: NOT DETECTED
CALCIUM SPEC-SCNC: 9.2 MG/DL (ref 8.6–10.5)
CHLORIDE SERPL-SCNC: 114 MMOL/L (ref 98–107)
CO2 SERPL-SCNC: 23 MMOL/L (ref 22–29)
CREAT SERPL-MCNC: 1.64 MG/DL (ref 0.57–1)
DEPRECATED RDW RBC AUTO: 49.4 FL (ref 37–54)
EGFRCR SERPLBLD CKD-EPI 2021: 30.2 ML/MIN/1.73
EOSINOPHIL # BLD AUTO: 0.04 10*3/MM3 (ref 0–0.4)
EOSINOPHIL NFR BLD AUTO: 0.4 % (ref 0.3–6.2)
ERYTHROCYTE [DISTWIDTH] IN BLOOD BY AUTOMATED COUNT: 13.3 % (ref 12.3–15.4)
GLOBULIN UR ELPH-MCNC: 3.2 GM/DL
GLUCOSE BLDC GLUCOMTR-MCNC: 102 MG/DL (ref 70–105)
GLUCOSE BLDC GLUCOMTR-MCNC: 69 MG/DL (ref 70–105)
GLUCOSE BLDC GLUCOMTR-MCNC: 76 MG/DL (ref 70–105)
GLUCOSE BLDC GLUCOMTR-MCNC: 85 MG/DL (ref 70–105)
GLUCOSE BLDC GLUCOMTR-MCNC: 94 MG/DL (ref 70–105)
GLUCOSE BLDC GLUCOMTR-MCNC: 97 MG/DL (ref 70–105)
GLUCOSE SERPL-MCNC: 92 MG/DL (ref 65–99)
HCT VFR BLD AUTO: 41.9 % (ref 34–46.6)
HGB BLD-MCNC: 13.1 G/DL (ref 12–15.9)
IMM GRANULOCYTES # BLD AUTO: 0.06 10*3/MM3 (ref 0–0.05)
IMM GRANULOCYTES NFR BLD AUTO: 0.6 % (ref 0–0.5)
LYMPHOCYTES # BLD AUTO: 2.71 10*3/MM3 (ref 0.7–3.1)
LYMPHOCYTES NFR BLD AUTO: 27.6 % (ref 19.6–45.3)
MAGNESIUM SERPL-MCNC: 2.2 MG/DL (ref 1.6–2.4)
MCH RBC QN AUTO: 31 PG (ref 26.6–33)
MCHC RBC AUTO-ENTMCNC: 31.3 G/DL (ref 31.5–35.7)
MCV RBC AUTO: 99.1 FL (ref 79–97)
MONOCYTES # BLD AUTO: 1 10*3/MM3 (ref 0.1–0.9)
MONOCYTES NFR BLD AUTO: 10.2 % (ref 5–12)
NEUTROPHILS NFR BLD AUTO: 6.01 10*3/MM3 (ref 1.7–7)
NEUTROPHILS NFR BLD AUTO: 61.1 % (ref 42.7–76)
NRBC BLD AUTO-RTO: 0 /100 WBC (ref 0–0.2)
PHOSPHATE SERPL-MCNC: 2.7 MG/DL (ref 2.5–4.5)
PLATELET # BLD AUTO: 171 10*3/MM3 (ref 140–450)
PMV BLD AUTO: 10.5 FL (ref 6–12)
POTASSIUM SERPL-SCNC: 3.8 MMOL/L (ref 3.5–5.2)
PROT SERPL-MCNC: 6.8 G/DL (ref 6–8.5)
RBC # BLD AUTO: 4.23 10*6/MM3 (ref 3.77–5.28)
SODIUM SERPL-SCNC: 149 MMOL/L (ref 136–145)
WBC NRBC COR # BLD AUTO: 9.83 10*3/MM3 (ref 3.4–10.8)

## 2025-03-13 PROCEDURE — 97166 OT EVAL MOD COMPLEX 45 MIN: CPT

## 2025-03-13 PROCEDURE — 82948 REAGENT STRIP/BLOOD GLUCOSE: CPT

## 2025-03-13 PROCEDURE — 97162 PT EVAL MOD COMPLEX 30 MIN: CPT

## 2025-03-13 PROCEDURE — 84100 ASSAY OF PHOSPHORUS: CPT

## 2025-03-13 PROCEDURE — 92526 ORAL FUNCTION THERAPY: CPT

## 2025-03-13 PROCEDURE — 85025 COMPLETE CBC W/AUTO DIFF WBC: CPT

## 2025-03-13 PROCEDURE — 83735 ASSAY OF MAGNESIUM: CPT

## 2025-03-13 PROCEDURE — 80053 COMPREHEN METABOLIC PANEL: CPT

## 2025-03-13 RX ORDER — INSULIN LISPRO 100 [IU]/ML
2-7 INJECTION, SOLUTION INTRAVENOUS; SUBCUTANEOUS
Status: DISCONTINUED | OUTPATIENT
Start: 2025-03-13 | End: 2025-03-19 | Stop reason: HOSPADM

## 2025-03-13 RX ADMIN — Medication 10 ML: at 09:59

## 2025-03-13 RX ADMIN — MUPIROCIN 1 APPLICATION: 20 OINTMENT TOPICAL at 21:02

## 2025-03-13 RX ADMIN — Medication 10 ML: at 21:02

## 2025-03-13 RX ADMIN — SENNOSIDES AND DOCUSATE SODIUM 2 TABLET: 50; 8.6 TABLET ORAL at 21:02

## 2025-03-13 RX ADMIN — ATORVASTATIN CALCIUM 80 MG: 40 TABLET, FILM COATED ORAL at 21:02

## 2025-03-13 RX ADMIN — MUPIROCIN 1 APPLICATION: 20 OINTMENT TOPICAL at 09:49

## 2025-03-13 RX ADMIN — ASPIRIN 325 MG ORAL TABLET 325 MG: 325 PILL ORAL at 09:49

## 2025-03-13 RX ADMIN — SENNOSIDES AND DOCUSATE SODIUM 2 TABLET: 50; 8.6 TABLET ORAL at 09:49

## 2025-03-13 NOTE — PLAN OF CARE
Goal Outcome Evaluation:  Plan of Care Reviewed With: patient, family           Outcome Evaluation: 86 yo female admitted from [facility] with R side weakness and aphasia.  Pt was given TNK for L MCA stroke.  Pt has improved with movement in R side and ability to speak.  Pts son present for eval.  Reports she is w/c bound at baseline via use of sully lift or heavy A x2 for transfers.  She is unable to propel self in w/c and has A for all ADLs.  She does typically feed self.  This date, pt was able to state her name and particiapting in conversation.  She required mod/max A x2 to sit EOB.  Varied A for sitting balance with frequent posterior lean.  Pt appears near her baseline functional status [facility] plan for return to SNF at d/c.  No further skilled PT needs while IP, would require sully lift transfer OOB to chair.    Anticipated Discharge Disposition (PT): skilled nursing facility

## 2025-03-13 NOTE — PROGRESS NOTES
[facility] MEDICINE SERVICE  DAILY PROGRESS NOTE    NAME: Lynda Patterson  : 1940  MRN: [id number]      LOS: 2 days     PROVIDER OF SERVICE: Shanta Bowser MD    Chief Complaint: CVA (cerebral vascular accident)    Subjective:   Patient alert and oriented has had a bowel movement yesterday.  Told me that she lives with her .  Denies any chest pain nausea vomiting diarrhea  Interval History:    Patient seen and evaluated at bedside.   H&P, consults, labs and imaging reviewed  Treatment plan discussed with patient. All questions addressed.     Review of Systems:   All 21 ROS were negative except mentioned above.    Objective:     Vital Signs  Temp:  [96.8 °F (36 °C)-98.4 °F (36.9 °C)] 96.8 °F (36 °C)  Heart Rate:  [63-82] 76  Resp:  [13-17] 16  BP: ()/(32-92) 111/84   Body mass index is 30.37 kg/m².    Physical Exam   General: No acute distress, appears stated age  Neuro: Awake and alert, oriented , focal deficits appreciated  Head: Atraumatic, normocephalic  HEENT: EOMI, anicteric, normal sclerae and conjunctivae, moist mucus membranes  Neck: supple, no lymphadenopathy  CV: RRR, soft heart sounds, no murmurs appreciated, no peripheral edema  Pulm: Decreased breath sounds, no increased work of breathing, no adventitious sounds  Abd: Soft, nontender, nondistended  Skin: Warm, dry and intact  Psych: Appropriate mood and affect    Scheduled Meds   aspirin, 325 mg, Oral, Daily   Or  aspirin, 300 mg, Rectal, Daily  atorvastatin, 80 mg, Oral, Nightly  [Held by provider] buPROPion XL, 150 mg, Oral, Daily  [Held by provider] busPIRone, 10 mg, Oral, TID  insulin lispro, 2-7 Units, Subcutaneous, Q6H  mupirocin, 1 Application, Each Nare, BID  senna-docusate sodium, 2 tablet, Oral, BID  [Held by provider] sertraline, 25 mg, Oral, Daily  sodium chloride, 10 mL, Intravenous, Q12H  sodium chloride, 10 mL, Intravenous, Q12H       PRN Meds     aluminum-magnesium hydroxide-simethicone    senna-docusate  sodium **AND** polyethylene glycol **AND** bisacodyl **AND** bisacodyl    Calcium Replacement - Follow Nurse / BPA Driven Protocol    dextrose    dextrose    glucagon (human recombinant)    hydrALAZINE    Magnesium Low Dose Replacement - Follow Nurse / BPA Driven Protocol    nitroglycerin    Phosphorus Replacement - Follow Nurse / BPA Driven Protocol    Potassium Replacement - Follow Nurse / BPA Driven Protocol    prochlorperazine    sodium chloride    sodium chloride    sodium chloride    sodium chloride    sodium chloride    [Held by provider] traMADol   Infusions         Diagnostic Data    Results from last 7 days   Lab Units 03/13/25  0328   WBC 10*3/mm3 9.83   HEMOGLOBIN g/dL 13.1   HEMATOCRIT % 41.9   PLATELETS 10*3/mm3 171   GLUCOSE mg/dL 92   CREATININE mg/dL 1.64*   BUN mg/dL 42*   SODIUM mmol/L 149*   POTASSIUM mmol/L 3.8   AST (SGOT) U/L 30   ALT (SGPT) U/L 17   ALK PHOS U/L 56   BILIRUBIN mg/dL 0.4   ANION GAP mmol/L 12.0       CT Head Without Contrast  Result Date: 3/12/2025  Impression: No acute intracranial findings. Electronically Signed: Liang Key  3/12/2025 5:31 PM EDT  Workstation ID: [id number]    MRI Brain Without Contrast  Result Date: 3/12/2025  Impression: Atrophy and chronic microvascular ischemic change. No acute intracranial process. Electronically Signed: Mihir García MD  3/12/2025 12:16 AM EDT  Workstation ID: [id number]    XR Chest 1 View  Result Date: 3/11/2025  Impression: Loop recorder projects over the left lower chest. No radiographic evidence of an acute cardiopulmonary abnormality. Electronically Signed: Liang Key  3/11/2025 4:37 PM EDT  Workstation ID: [id number]    CT Angiogram Head w AI Analysis of LVO  Result Date: 3/11/2025  Impression: 1. No evidence of hemodynamically significant stenosis of the carotid or vertebral arteries. 2. Areas of irregularity with beaded appearance involving the distal bilateral internal carotid arteries and distal V2 segment of the left  vertebral artery suggesting changes of fibromuscular dysplasia. 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 4. Occlusion of the the anterior M2 branch of the left middle cerebral artery. This would correspond to the perfusion abnormality. Electronically Signed: Bhaskar Kimble MD  3/11/2025 4:31 PM EDT  Workstation ID: [id number]    CT Angiogram Neck  Result Date: 3/11/2025  Impression: 1. No evidence of hemodynamically significant stenosis of the carotid or vertebral arteries. 2. Areas of irregularity with beaded appearance involving the distal bilateral internal carotid arteries and distal V2 segment of the left vertebral artery suggesting changes of fibromuscular dysplasia. 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 4. Occlusion of the the anterior M2 branch of the left middle cerebral artery. This would correspond to the perfusion abnormality. Electronically Signed: Bhaskar Kimble MD  3/11/2025 4:31 PM EDT  Workstation ID: [id number]    CT CEREBRAL PERFUSION WITH & WITHOUT CONTRAST  Result Date: 3/11/2025  1.motion-degraded study with true area of Tmax rater than 6 seconds involving the left MCA distribution. Precise volume is not quantifiable due to motion artifact. Within this region there is a 7 mL volume of cerebral blood flow less than 30% compatible with a component of core infarct. Corresponding anterior M2 occlusion is noted on CTA. These findings were discussed with Dr. Cruz in the emergency department at 3:51 p.m. on 3/11/2025 Electronically Signed: Bhaskar Kimble MD  3/11/2025 3:51 PM EDT  Workstation ID: [id number]    CT Head Without Contrast Stroke Protocol  Result Date: 3/11/2025  Impression: 1. No intracranial hemorrhage. 2. Generalized cerebral atrophy with moderate white matter findings of chronic microvascular disease. 3. Left mastoid effusion. Electronically Signed: Hal Cee MD  3/11/2025 3:37 PM EDT  Workstation ID: [id number]      Interval results reviewed.    Assessment/Plan:   Acute ischemic  stroke status post tenecteplase on 3/11/2025  HF reduced ejection fraction.  Diabetes  SONYA on CKD 3  GERD  Elevated anion gap    Neurology following the patient.  Recommendations noted.  Continue aspirin statins  2D echo on 3/12/2025 showed ejection fraction of 36 to 40% with hypokinesis  Loop recorder in Place    Continue normal saline.  SONYA most likely due to diabetic nephropathy, hypertensive nephrosclerosis  Monitor renal function and avoid nephrotoxic medications and NSAIDs.    Treatment plan discussed with RN.     VTE Prophylaxis:  Mechanical VTE prophylaxis orders are present.         Code status is   Code Status and Medical Interventions: No CPR (Do Not Attempt to Resuscitate); Limited Support; No intubation (DNI)   Ordered at: 03/11/25 2230     Code Status (Patient has no pulse and is not breathing):    No CPR (Do Not Attempt to Resuscitate)     Medical Interventions (Patient has pulse or is breathing):    Limited Support     Medical Intervention Limits:    No intubation (DNI)     Level Of Support Discussed With:    Health Care Surrogate       Plan for disposition:     Barriers to Discharge: Stroke workup  Anticipated Date of Discharge: 3/15/2025  Place of Discharge: Skilled nursing facility      Time: 40 minutes     Signature: Electronically signed by Shanta Bowser MD, 03/13/25, 12:28 EDT.  [facility] Hospitalist Team

## 2025-03-13 NOTE — CONSULTS
Diabetes Education    Patient Name:  Lynda Patterson  YOB: 1940  MRN: [id number]  Admit Date:  3/11/2025        MD consult per stroke protocol. On 3/11/2025 A1c was 6.08%. A1c info sheet given with discussion on A1c target and healthy blood sugar range. Patient resides at [facility] where the staff check her blood sugars and administer meds. Currently patient not on any diabetes meds. Patient has no further questions or concerns related to diabetes at this time.      Electronically signed by:  Hermelinda Olsen RN  03/12/25 21:23 EDT

## 2025-03-13 NOTE — THERAPY TREATMENT NOTE
Acute Care - Speech Language Pathology   Swallow Treatment Note [facility]     Patient Name: Lynda Patterson  : 1940  MRN: [id number]  Today's Date: 3/13/2025               Admit Date: 3/11/2025    Visit Dx:     ICD-10-CM ICD-9-CM   1. Acute CVA (cerebrovascular accident)  I63.9 434.91     Patient Active Problem List   Diagnosis    Mixed hyperlipidemia    Macrocytic anemia    Type 2 diabetes mellitus with stage 3 chronic kidney disease, without long-term current use of insulin    Acute low back pain    Anxiety disorder    Carotid artery stenosis    Chronic kidney disease, stage III (moderate)    Depressive disorder    Disorder associated with type 2 diabetes mellitus    Encounter for general adult medical examination without abnormal findings    Fatigue    Gastroesophageal reflux disease    Gout    Hyperkalemia    Hypertension    Osteoarthritis    Postherpetic neuralgia    Shingles    Urinary incontinence    Vitamin D deficiency    Low back pain    Essential (primary) hypertension    Type 2 diabetes mellitus with diabetic chronic kidney disease    Degenerative drusen    Hypermetropia    Pseudophakia    Sprain of left ankle, initial encounter    Nondisplaced fracture of proximal end of left fibula    Encounter for general adult medical examination with abnormal findings    Obesity (BMI 30-39.9)    Splenic cyst    Status post placement of implantable loop recorder    Syncope    CVA (cerebral vascular accident)     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 Semi-reclined in bed upon entry on room air with son present. Son reports pt had been coughing prior to clinician's entry. SLP noted straws present on pt's meal tray. Removed, discarded, and educated on no straw recommendation. Son verbalized agreement and understanding. Pt agreeable to intervention, so HOB was positioned 90 degrees upright. Pt self-fed with SLP assistance x3 bites of puree and x3 drinks of nectar thick liquid by cup. Demonstrated adequate labial seal with no anterior spillage; clear voice after every trial; suspect timely swallow based on visualization; mild throat clear x2; delayed cough x1. Educated pt regarding safe swallow precautions- small intake and slow rate with no straws.        Recommendations:  - Puree and nectar thick liquids (NTL), no straw, w/ [place] Water Protocol (see guidelines below)  - Feeding assistance  - Strict aspiration and reflux precautions   - Meds: whole or crushed in puree or NTL  - Safe swallow strategies: upright during all PO/meds, slow rate of intake, small bites/sips, alternate liquids and solids  - ST will continue to follow to ensure tolerance and safety of rec'd diet, and provide further recs as indicated     Water Protocol  The rationale to recommend water when a PO diet cannot appropriately/functionally sustain nutrition (or if thickened liquids are prescribed due to aspiration of thin liquids) is because water is low risk for aspiration pna when compared to aspiration of food or other liquids.    Benefits of a  water protocol include but are not limited to:     Oral gratification  Engagement of oropharyngeal swallow musculature  Decrease likelihood of dehydration       Guidelines for proper implementation include:  Waiting a minimum of 30 minutes after consuming any other PO  Thorough oral care prior to consuming water  Upright at 90 degree hip flexion  Small sips at slow rate  Monitor for any changes in respiratory status and discontinue if distress noted     The [place] Free Water Protocol is a research based protocol established in 1984.                                                                          SWALLOW EVALUATION (Last 72 Hours)       SLP Adult Swallow Evaluation       Row Name 03/12/25 1300 03/12/25 0845                Rehab Evaluation    Document Type evaluation  -PF evaluation  -PF       Subjective Information no complaints  -PF no complaints  -PF       Patient Observations alert;cooperative  -PF cooperative;alert  -PF       Patient Effort good  -PF good  -PF       Symptoms Noted During/After Treatment none  -PF none  -PF          General Information    Patient Profile Reviewed yes  -PF yes  -PF       Pertinent History Of Current Problem -- Per H&P:  -PF       Current Method of Nutrition NPO  -PF NPO  -PF       Precautions/Limitations, Vision WFL;for purposes of eval  -PF WFL;for purposes of eval  -PF       Precautions/Limitations, Hearing WFL;for purposes of eval  -PF WFL;for purposes of eval  -PF       Prior Level of Function-Communication WFL  -PF WFL  -PF       Prior Level of Function-Swallowing puree;thin liquids  -PF puree;thin liquids  -PF       Plans/Goals Discussed with patient;family  -PF patient;family  -PF          Pain    Pretreatment Pain Rating 0/10 - no pain  -PF 0/10 - no pain  -PF       Posttreatment Pain Rating 0/10 - no pain  -PF 0/10 - no pain  -PF          Oral Motor Structure and Function    Dentition Assessment natural, present and adequate  -PF natural, present and adequate   -PF       Secretion Management WNL/WFL  -PF WNL/WFL  -PF       Mucosal Quality dry  -PF dry  -PF          Oral Musculature and Cranial Nerve Assessment    Oral Motor General Assessment generalized oral motor weakness  -PF generalized oral motor weakness  -PF          General Eating/Swallowing Observations    Respiratory Support Currently in Use room air  -PF room air  -PF       Eating/Swallowing Skills self-fed;fed by SLP;needed assist  -PF self-fed;fed by SLP;needed assist  -PF       Positioning During Eating upright 90 degree;upright in bed  -PF upright 90 degree;upright in bed  -PF       Utensils Used spoon;cup  -PF spoon;cup  -PF       Consistencies Trialed ice chips;thin liquids;pureed  -PF ice chips;thin liquids;pureed  -PF          Clinical Swallow Eval    Clinical Swallow Evaluation Summary -- Clinical swallow evaluation completed.  -PF          MBS/VFSS    Utensils Used spoon;cup;straw  -PF --       Consistencies Trialed nectar/syrup-thick liquids;thin liquids;pureed;mixed consistency;mechanical ground textures  -PF --          MBS/VFSS Interpretation    VFSS Summary VFSS completed.  -PF --          SLP Evaluation Clinical Impression    SLP Swallowing Diagnosis moderate;oral dysphagia;pharyngeal dysphagia  -PF moderate;oral dysphagia;suspected pharyngeal dysphagia;R/O pharyngeal dysphagia  -PF       Functional Impact risk of aspiration/pneumonia;risk of malnutrition;risk of dehydration  -PF risk of aspiration/pneumonia;risk of malnutrition;risk of dehydration  -PF       Rehab Potential/Prognosis, Swallowing good, to achieve stated therapy goals;adequate, monitor progress closely  -PF good, to achieve stated therapy goals;adequate, monitor progress closely  -PF       Swallow Criteria for Skilled Therapeutic Interventions Met demonstrates skilled criteria  -PF demonstrates skilled criteria  -PF          Recommendations    Therapy Frequency (Swallow) 3 days per week;4 days per week  -PF 3 days per week;4 days  per week  -PF       Predicted Duration Therapy Intervention (Days) until discharge  -PF until discharge  -PF       SLP Diet Recommendation NPO  -PF NPO  -PF       Recommended Diagnostics VFSS (MBS)  -PF VFSS (MBS)  -PF       Oral Care Recommendations Oral Care BID/PRN;Before ice/water  -PF Oral Care BID/PRN;Before ice/water  -PF       SLP Rec. for Method of Medication Administration meds via alternate route  -PF meds via alternate route  -PF       Monitor for Signs of Aspiration yes;notify SLP if any concerns  -PF yes;notify SLP if any concerns  -PF          Swallow Goals (SLP)    Swallow LTGs Swallow Long Term Goal (free text)  -PF Swallow Long Term Goal (free text)  -PF       Swallow STGs diet tolerance goal selection (SLP)  -PF diet tolerance goal selection (SLP)  -PF       Diet Tolerance Goal Selection (SLP) Swallow Short Term Goal 1;Swallow Short Term Goal 2  -PF Swallow Short Term Goal 1;Swallow Short Term Goal 2  -PF          (LTG) Swallow    (LTG) Swallow Pt will maximize swallow function for least restrictive PO diet, exhibiting no complication associated with dysphagia, adequate PO intake, and demonstrating independent use of swallow compensation.  -PF --       [place] (Swallow Long Term Goal) with minimal cues (75-90% accuracy)  -PF --       Time Frame (Swallow Long Term Goal) by discharge  -PF --       Progress/Outcomes (Swallow Long Term Goal) new goal  -PF --          (STG) Swallow 1    (STG) Swallow 1 The patient will participate in a full meal assessment to determine safety and adequacy of recommended diet, independent use of safe swallow compensations, and additional goals/recommendations to follow.  -PF --       [place] (Swallow Short Term Goal 1) with minimal cues (75-90% accuracy)  -PF --       Time Frame (Swallow Short Term Goal 1) 1 week  -PF --       Progress/Outcomes (Swallow Short Term Goal 1) new goal  -PF --                 User Key  (r) = Recorded By, (t) = Taken By, (c) =  Cosigned By      Initials Name Effective Dates    Zakiya Arora SLP 05/08/23 -                     EDUCATION  The patient has been educated in the following areas:   Dysphagia (Swallowing Impairment) Modified Diet Instruction.        SLP GOALS       Row Name 03/13/25 1400       (LTG) Swallow    (LTG) Swallow Pt will maximize swallow function for least restrictive PO diet, exhibiting no complication associated with dysphagia, adequate PO intake, and demonstrating independent use of swallow compensation.  -MB    [place] (Swallow Long Term Goal) with minimal cues (75-90% accuracy)  -MB    Time Frame (Swallow Long Term Goal) by discharge  -MB    Progress/Outcomes (Swallow Long Term Goal) goal ongoing  -MB       (STG) Swallow 1    (STG) Swallow 1 The patient will participate in a full meal assessment to determine safety and adequacy of recommended diet, independent use of safe swallow compensations, and additional goals/recommendations to follow.  -MB    [place] (Swallow Short Term Goal 1) with minimal cues (75-90% accuracy)  -MB    Time Frame (Swallow Short Term Goal 1) 1 week  -MB    Progress/Outcomes (Swallow Short Term Goal 1) goal ongoing  -MB    Comment (Swallow Short Term Goal 1) See above  -MB              User Key  (r) = Recorded By, (t) = Taken By, (c) = Cosigned By      Initials Name Provider Type    PF Fakto, [place], SLP Speech and Language Pathologist    Cabrera Julien, SLP Speech and Language Pathologist                         Time Calculation:                GURJIT Garza  3/13/2025

## 2025-03-13 NOTE — PLAN OF CARE
Pt blood sugar was 69 before lunch. Nectar thickened apple juice was given and blood sugar was recheck at 94.  Problem: Adult Inpatient Plan of Care  Goal: Plan of Care Review  Outcome: Progressing  Goal: Patient-Specific Goal (Individualized)  Outcome: Progressing  Goal: Absence of Hospital-Acquired Illness or Injury  Outcome: Progressing  Intervention: Identify and Manage Fall Risk  Recent Flowsheet Documentation  Taken 3/13/2025 1600 by Sydnee Pleitez RN  Safety Promotion/Fall Prevention:   activity supervised   assistive device/personal items within reach   clutter free environment maintained   fall prevention program maintained   room organization consistent   safety round/check completed   nonskid shoes/slippers when out of bed  Taken 3/13/2025 1400 by Sydnee Pleitez RN  Safety Promotion/Fall Prevention:   activity supervised   assistive device/personal items within reach   clutter free environment maintained   fall prevention program maintained   nonskid shoes/slippers when out of bed   room organization consistent   safety round/check completed  Taken 3/13/2025 1200 by Sydnee Pleitez RN  Safety Promotion/Fall Prevention:   activity supervised   assistive device/personal items within reach   clutter free environment maintained   fall prevention program maintained   nonskid shoes/slippers when out of bed   room organization consistent   safety round/check completed  Taken 3/13/2025 1000 by Sydnee Pleitez RN  Safety Promotion/Fall Prevention:   activity supervised   assistive device/personal items within reach   clutter free environment maintained   fall prevention program maintained   nonskid shoes/slippers when out of bed   room organization consistent   safety round/check completed  Taken 3/13/2025 0800 by Sydnee Pleitez RN  Safety Promotion/Fall Prevention:   activity supervised   assistive device/personal items within reach   clutter free environment maintained   fall prevention program  maintained   nonskid shoes/slippers when out of bed   room organization consistent   safety round/check completed  Intervention: Prevent Skin Injury  Recent Flowsheet Documentation  Taken 3/13/2025 0800 by Sydnee Pleitez RN  Body Position:   turned   left  Skin Protection: incontinence pads utilized  Intervention: Prevent and Manage VTE (Venous Thromboembolism) Risk  Recent Flowsheet Documentation  Taken 3/13/2025 1200 by Sydnee Pleitez RN  VTE Prevention/Management:   bilateral   SCDs (sequential compression devices) on  Intervention: Prevent Infection  Recent Flowsheet Documentation  Taken 3/13/2025 1600 by Sydnee Pleitez RN  Infection Prevention: environmental surveillance performed  Taken 3/13/2025 1400 by Sydnee Pleitez RN  Infection Prevention: environmental surveillance performed  Taken 3/13/2025 1200 by Sydnee Pleitez RN  Infection Prevention: environmental surveillance performed  Taken 3/13/2025 1000 by Sydnee Pleitez RN  Infection Prevention: environmental surveillance performed  Taken 3/13/2025 0800 by Sydnee Pleitez RN  Infection Prevention:   environmental surveillance performed   equipment surfaces disinfected   hand hygiene promoted   rest/sleep promoted   single patient room provided  Goal: Optimal Comfort and Wellbeing  Outcome: Progressing  Intervention: Provide Person-Centered Care  Recent Flowsheet Documentation  Taken 3/13/2025 1200 by Sydnee Pleitez RN  Trust Relationship/Rapport:   care explained   thoughts/feelings acknowledged  Taken 3/13/2025 0800 by Sydnee Pleitez RN  Trust Relationship/Rapport: care explained  Goal: Readiness for Transition of Care  Outcome: Progressing     Problem: Fall Injury Risk  Goal: Absence of Fall and Fall-Related Injury  Outcome: Progressing  Intervention: Identify and Manage Contributors  Recent Flowsheet Documentation  Taken 3/13/2025 1600 by Sydnee Pleitez RN  Medication Review/Management: medications reviewed  Taken 3/13/2025 1400 by  Sydnee Pleitez RN  Medication Review/Management: medications reviewed  Taken 3/13/2025 1200 by Sydnee Pleitez RN  Medication Review/Management: medications reviewed  Taken 3/13/2025 1000 by Sydnee Pleitez RN  Medication Review/Management: medications reviewed  Taken 3/13/2025 0800 by Sydnee Pleitez RN  Medication Review/Management: medications reviewed  Intervention: Promote Injury-Free Environment  Recent Flowsheet Documentation  Taken 3/13/2025 1600 by Sydnee Pleitez RN  Safety Promotion/Fall Prevention:   activity supervised   assistive device/personal items within reach   clutter free environment maintained   fall prevention program maintained   room organization consistent   safety round/check completed   nonskid shoes/slippers when out of bed  Taken 3/13/2025 1400 by Sydnee Pleitez RN  Safety Promotion/Fall Prevention:   activity supervised   assistive device/personal items within reach   clutter free environment maintained   fall prevention program maintained   nonskid shoes/slippers when out of bed   room organization consistent   safety round/check completed  Taken 3/13/2025 1200 by Sydnee Pleitez RN  Safety Promotion/Fall Prevention:   activity supervised   assistive device/personal items within reach   clutter free environment maintained   fall prevention program maintained   nonskid shoes/slippers when out of bed   room organization consistent   safety round/check completed  Taken 3/13/2025 1000 by Sydnee Pleitez RN  Safety Promotion/Fall Prevention:   activity supervised   assistive device/personal items within reach   clutter free environment maintained   fall prevention program maintained   nonskid shoes/slippers when out of bed   room organization consistent   safety round/check completed  Taken 3/13/2025 0800 by Sydnee Pleitez RN  Safety Promotion/Fall Prevention:   activity supervised   assistive device/personal items within reach   clutter free environment maintained   fall  prevention program maintained   nonskid shoes/slippers when out of bed   room organization consistent   safety round/check completed     Problem: Stroke, Ischemic (Includes Transient Ischemic Attack)  Goal: Optimal Coping  Outcome: Progressing  Intervention: Support Psychosocial Response to Stroke  Recent Flowsheet Documentation  Taken 3/13/2025 1200 by Sydnee Pleitez RN  Family/Support System Care: support provided  Taken 3/13/2025 0800 by Sydnee Pleitez RN  Family/Support System Care: support provided  Goal: Effective Bowel Elimination  Outcome: Progressing  Goal: Optimal Cerebral Tissue Perfusion  Outcome: Progressing  Intervention: Protect and Optimize Cerebral Perfusion  Recent Flowsheet Documentation  Taken 3/13/2025 0800 by Sydnee Pleitez RN  Cerebral Perfusion Promotion: blood pressure monitored  Goal: Optimal Cognitive Function  Outcome: Progressing  Intervention: Optimize Cognitive Function  Recent Flowsheet Documentation  Taken 3/13/2025 0800 by Sydnee Pleitez RN  Reorientation Measures:   calendar in view   clock in view   reorientation provided  Goal: Improved Communication Skills  Outcome: Progressing  Intervention: Optimize Communication Skills  Recent Flowsheet Documentation  Taken 3/13/2025 0800 by Sydnee Pleitez RN  Communication Enhancement Strategies:   extra time allowed for response   one-step directions provided   repetition utilized  Goal: Optimal Functional Ability  Outcome: Progressing  Goal: Optimal Nutrition Intake  Outcome: Progressing  Goal: Effective Oxygenation and Ventilation  Outcome: Progressing  Goal: Improved Sensorimotor Function  Outcome: Progressing  Intervention: Optimize Sensory and Perceptual Ability  Recent Flowsheet Documentation  Taken 3/13/2025 0800 by Sydnee Pleitez RN  Pressure Reduction Techniques:   weight shift assistance provided   heels elevated off bed   pressure points protected   positioned off wounds  Pressure Reduction Devices:   heel offloading  device utilized   positioning supports utilized   pressure-redistributing mattress utilized  Goal: Safe and Effective Swallow  Outcome: Progressing  Goal: Effective Urinary Elimination  Outcome: Progressing     Problem: Skin Injury Risk Increased  Goal: Skin Health and Integrity  Outcome: Progressing  Intervention: Optimize Skin Protection  Recent Flowsheet Documentation  Taken 3/13/2025 0800 by Sydnee Pleitez RN  Pressure Reduction Techniques:   weight shift assistance provided   heels elevated off bed   pressure points protected   positioned off wounds  Pressure Reduction Devices:   heel offloading device utilized   positioning supports utilized   pressure-redistributing mattress utilized  Skin Protection: incontinence pads utilized     Problem: Comorbidity Management  Goal: Blood Glucose Level Within Target Range  Outcome: Progressing  Intervention: Monitor and Manage Glycemia  Recent Flowsheet Documentation  Taken 3/13/2025 1600 by Sydnee Pleitez RN  Medication Review/Management: medications reviewed  Taken 3/13/2025 1400 by Sydnee Pleitez RN  Medication Review/Management: medications reviewed  Taken 3/13/2025 1200 by Sydnee Pleitez RN  Medication Review/Management: medications reviewed  Taken 3/13/2025 1000 by Sydnee Pleitez RN  Medication Review/Management: medications reviewed  Taken 3/13/2025 0800 by Sydnee Pleitez RN  Medication Review/Management: medications reviewed     Problem: Violence Risk or Actual  Goal: Anger and Impulse Control  Outcome: Progressing  Intervention: Minimize Safety Risk  Recent Flowsheet Documentation  Taken 3/13/2025 1600 by Sydnee Pleitez RN  Enhanced Safety Measures: bed alarm set  Taken 3/13/2025 1400 by Sydnee Pleitez RN  Enhanced Safety Measures: bed alarm set  Taken 3/13/2025 1200 by Sydnee Pleitez RN  Enhanced Safety Measures:   bed alarm set   family to remain at bedside  Taken 3/13/2025 1000 by Sydnee Pleitez RN  Enhanced Safety Measures: bed alarm  set  Taken 3/13/2025 0800 by Sydnee Pleitez RN  Enhanced Safety Measures: bed alarm set   Goal Outcome Evaluation:

## 2025-03-13 NOTE — PLAN OF CARE
Goal Outcome Evaluation:  Plan of Care Reviewed With: patient, son           Outcome Evaluation: 85 y.o. female with PMH of type 2 diabetes, CKD stage III, anxiety, GERD presented from an extended care facility to the hospital for sudden onset weakness and inability to speak. She had expressive aphasia and hemiparesis on the right side. CT perfusion showed a significant delay in the left MCA distribution and CTA confirmed an M2 occlusion. TNK administered at 1539 on 3/11/25 and pt admitted to ICU level. At baseline, pt at [facility]. She is able to feed herself, though requires assist for ADLs, sully lift at baseline, otherwise heavy x2 assist for transfers. Upon assessment, pt supine in bed and oriented to first/last name only with verbal cueing. Pt completes rolling L/R with min/mod assist to dependently complete alex-care and comes to sitting EOB with max A x2. Pt fluctuates between CGA/max assist to sustain static sitting balance. Max A x2 to return to supine. Pt dependent assist to don socks, though does initiate. Adult son in room present providing PLOF and pt appears near her baseline at this time. OT will sign off and complete orders, please re-consult if pt has a change in status. OT recommending return to SNF.    Anticipated Discharge Disposition (OT): skilled nursing facility

## 2025-03-13 NOTE — THERAPY EVALUATION
Patient Name: Lynda Patterson  : 1940    MRN: [id number]                              Today's Date: 3/13/2025       Admit Date: 3/11/2025    Visit Dx:     ICD-10-CM ICD-9-CM   1. Acute CVA (cerebrovascular accident)  I63.9 434.91     Patient Active Problem List   Diagnosis    Mixed hyperlipidemia    Macrocytic anemia    Type 2 diabetes mellitus with stage 3 chronic kidney disease, without long-term current use of insulin    Acute low back pain    Anxiety disorder    Carotid artery stenosis    Chronic kidney disease, stage III (moderate)    Depressive disorder    Disorder associated with type 2 diabetes mellitus    Encounter for general adult medical examination without abnormal findings    Fatigue    Gastroesophageal reflux disease    Gout    Hyperkalemia    Hypertension    Osteoarthritis    Postherpetic neuralgia    Shingles    Urinary incontinence    Vitamin D deficiency    Low back pain    Essential (primary) hypertension    Type 2 diabetes mellitus with diabetic chronic kidney disease    Degenerative drusen    Hypermetropia    Pseudophakia    Sprain of left ankle, initial encounter    Nondisplaced fracture of proximal end of left fibula    Encounter for general adult medical examination with abnormal findings    Obesity (BMI 30-39.9)    Splenic cyst    Status post placement of implantable loop recorder    Syncope    CVA (cerebral vascular accident)     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    Macular degeneration     Mild cognitive impairment of uncertain or unknown etiology     Muscle weakness (generalized)     Nondisplaced fracture of proximal end of left  fibula 09/03/2021    Reduced mobility     Repeated falls     Sprain of left ankle, initial encounter 08/20/2021    Syncope     Urinary tract infection     Vitamin D deficiency      Past Surgical History:   Procedure Laterality Date    CHOLECYSTECTOMY  1988    TOTAL ABDOMINAL HYSTERECTOMY  1970      General Information       Row Name 03/13/25 1016          Physical Therapy Time and Intention    Document Type evaluation  -     Mode of Treatment physical therapy  -       Row Name 03/13/25 1016          General Information    Patient Profile Reviewed yes  -     Prior Level of Function max assist:;dependent:  pt feeds self, A for dressing/bathing.  uses w/c, either sully lift or A x2 for transfers,  unable to self propel  -     Existing Precautions/Restrictions fall  -     Barriers to Rehab medically complex;previous functional deficit;cognitive status  -       Row Name 03/13/25 1016          Living Environment    Current Living Arrangements extended care facility  -     People in Home other (see comments);facility resident  son reports pts spouse also lives at [facility] in memory care unit, pt is on the skilled unit  -       Row Name 03/13/25 1016          Cognition    Orientation Status (Cognition) oriented to;person;disoriented to;place;situation;time  -       Row Name 03/13/25 1016          Safety Issues/Impairments Affecting Functional Mobility    Safety Issues Affecting Function (Mobility) insight into deficits/self-awareness;judgment;problem-solving;sequencing abilities  -     Impairments Affecting Function (Mobility) balance;cognition;postural/trunk control;strength  -               User Key  (r) = Recorded By, (t) = Taken By, (c) = Cosigned By      Initials Name Provider Type     Elsie Ward PT Physical Therapist                   Mobility       Row Name 03/13/25 1020          Bed Mobility    Bed Mobility rolling left;rolling right;scooting/bridging;supine-sit;sit-supine  -     Rolling  Left [place] (Bed Mobility) minimum assist (75% patient effort);moderate assist (50% patient effort)  -     Rolling Right [place] (Bed Mobility) minimum assist (75% patient effort);moderate assist (50% patient effort)  -     Scooting/Bridging [place] (Bed Mobility) dependent (less than 25% patient effort)  -     Supine-Sit [place] (Bed Mobility) maximum assist (25% patient effort);2 person assist  -     Sit-Supine [place] (Bed Mobility) maximum assist (25% patient effort);2 person assist  -     Assistive Device (Bed Mobility) repositioning sheet;head of bed elevated;bed rails  -     Comment, (Bed Mobility) pt initiates A with rolling and supine to sit  -[facility] Name 03/13/25 1020          Bed-Chair Transfer    Bed-Chair [place] (Transfers) not tested  -[facility] Name 03/13/25 1020          Sit-Stand Transfer    Sit-Stand [place] (Transfers) not tested  -[facility] Name 03/13/25 1020          Gait/Stairs (Locomotion)    [place] Level (Gait) not tested  -               User Key  (r) = Recorded By, (t) = Taken By, (c) = Cosigned By      Initials Name Provider Type     Elsie Ward, PT Physical Therapist                   Obj/Interventions       [facility] Name 03/13/25 1021          Range of Motion Comprehensive    General Range of Motion bilateral lower extremity ROM WFL  -[facility] Name 03/13/25 1021          Strength Comprehensive (MMT)    Comment, General Manual Muscle Testing (MMT) Assessment gross strength BLEs 3/5, appears equal bilaterally  -[facility] Name 03/13/25 1021          Balance    Balance Assessment sitting static balance;sitting dynamic balance  -     Static Sitting Balance moderate assist;other (see comments)  initially with posterior lean requiring mod/max A for support, with time and reaching activity, pt able to sit with SB/CGA at times.  -       Row Name 03/13/25 1021          Sensory Assessment (Somatosensory)    Sensory  Assessment (Somatosensory) unable/difficult to assess  -               User Key  (r) = Recorded By, (t) = Taken By, (c) = Cosigned By      Initials Name Provider Type    Elsie Castañeda, PT Physical Therapist                   Goals/Plan    No documentation.                  Clinical Impression       [facility] Name 03/13/25 1022          Pain    Additional Documentation Pain Scale: FACES Pre/Post-Treatment (Group)  -J[facility]       Row Name 03/13/25 1022          Pain Scale: FACES Pre/Post-Treatment    Pain: FACES Scale, Pretreatment 0-->no hurt  -     Posttreatment Pain Rating 0-->no hurt  -[facility] Name 03/13/25 1022          Plan of Care Review    Plan of Care Reviewed With patient;family  -     Outcome Evaluation 84 yo female admitted from [facility] with R side weakness and aphasia.  Pt was given TNK for L MCA stroke.  Pt has improved with movement in R side and ability to speak.  Pts son present for eval.  Reports she is w/c bound at baseline via use of sully lift or heavy A x2 for transfers.  She is unable to propel self in w/c and has A for all ADLs.  She does typically feed self.  This date, pt was able to state her name and particiapting in conversation.  She required mod/max A x2 to sit EOB.  Varied A for sitting balance with frequent posterior lean.  Pt appears near her baseline functional status [facility] plan for return to SNF at d/c.  No further skilled PT needs while IP, would require sully lift transfer OOB to chair.  -[facility] Name 03/13/25 1022          Therapy Assessment/Plan (PT)    Criteria for Skilled Interventions Met (PT) no;does not meet criteria for skilled intervention  -     Therapy Frequency (PT) evaluation only  -[facility] Name 03/13/25 1022          Vital Signs    Pre Systolic BP Rehab 141  -JH     Pre Treatment Diastolic BP 84  supine  -JH     Intra Systolic BP Rehab 97  -JH     Intra Treatment Diastolic BP 52  sitting  -JH     Post Systolic BP Rehab 112  -JH     Post Treatment Diastolic  BP 54  supine  -     O2 Delivery Pre Treatment room air  -     O2 Delivery Intra Treatment room air  -     O2 Delivery Post Treatment room air  -       Row Name 03/13/25 1022          Positioning and Restraints    Pre-Treatment Position in bed  -     Post Treatment Position bed  -     In Bed side lying left;with nsg;call light within reach;with family/caregiver  -               User Key  (r) = Recorded By, (t) = Taken By, (c) = Cosigned By      Initials Name Provider Type    Elsie Castañeda, TIFFANI Physical Therapist                   Outcome Measures       Row Name 03/13/25 1032          How much help from another person do you currently need...    Turning from your back to your side while in flat bed without using bedrails? 2  -     Moving from lying on back to sitting on the side of a flat bed without bedrails? 2  -JH     Moving to and from a bed to a chair (including a wheelchair)? 1  -     Standing up from a chair using your arms (e.g., wheelchair, bedside chair)? 1  -     Climbing 3-5 steps with a railing? 1  -     To walk in hospital room? 1  -     AM-PAC 6 Clicks Score (PT) 8  -     Highest Level of Mobility Goal 3 --> Sit at edge of bed  -       Row Name 03/13/25 1032          Modified Camille Scale    Modified [place] Scale 5 - Severe disability.  Bedridden, incontinent, and requiring constant nursing care and attention.  -       Row Name 03/13/25 1032          Functional Assessment    Outcome Measure Options AM-PAC 6 Clicks Basic Mobility (PT);Modified [place]  -               User Key  (r) = Recorded By, (t) = Taken By, (c) = Cosigned By      Initials Name Provider Type    Elsie Castañeda, PT Physical Therapist                                   PT Recommendation and Plan     Outcome Evaluation: 84 yo female admitted from [facility] with R side weakness and aphasia.  Pt was given TNK for L MCA stroke.  Pt has improved with movement in R side and ability to speak.  Pts son present for  ambrocio.  Reports she is w/c bound at baseline via use of sully lift or heavy A x2 for transfers.  She is unable to propel self in w/c and has A for all ADLs.  She does typically feed self.  This date, pt was able to state her name and particiapting in conversation.  She required mod/max A x2 to sit EOB.  Varied A for sitting balance with frequent posterior lean.  Pt appears near her baseline functional status [facility] plan for return to SNF at d/c.  No further skilled PT needs while IP, would require sully lift transfer OOB to chair.     Time Calculation:         PT Charges       Row Name 03/13/25 1034             Time Calculation    Start Time 0912  -      Stop Time 0945  -      Time Calculation (min) 33 min  -      PT Received On 03/13/25  -         Time Calculation- PT    Total Timed Code Minutes- PT 0 minute(s)  -                User Key  (r) = Recorded By, (t) = Taken By, (c) = Cosigned By      Initials Name Provider Type     Elsie Ward, PT Physical Therapist                  Therapy Charges for Today       Code Description Service Date Service Provider Modifiers Qty    [id number]  PT EVAL MOD COMPLEXITY 4 3/13/2025 Elsie Ward, PT GP 1            PT G-Codes  Outcome Measure Options: AM-PAC 6 Clicks Basic Mobility (PT), Modified [place]  AM-PAC 6 Clicks Score (PT): 8  Modified [place] Scale: 5 - Severe disability.  Bedridden, incontinent, and requiring constant nursing care and attention.  PT Discharge Summary  Anticipated Discharge Disposition (PT): skilled nursing facility    Elsie Ward PT  3/13/2025

## 2025-03-13 NOTE — THERAPY EVALUATION
Patient Name: Lynda Patterson  : 1940    MRN: [id number]                              Today's Date: 3/13/2025       Admit Date: 3/11/2025    Visit Dx:     ICD-10-CM ICD-9-CM   1. Acute CVA (cerebrovascular accident)  I63.9 434.91     Patient Active Problem List   Diagnosis    Mixed hyperlipidemia    Macrocytic anemia    Type 2 diabetes mellitus with stage 3 chronic kidney disease, without long-term current use of insulin    Acute low back pain    Anxiety disorder    Carotid artery stenosis    Chronic kidney disease, stage III (moderate)    Depressive disorder    Disorder associated with type 2 diabetes mellitus    Encounter for general adult medical examination without abnormal findings    Fatigue    Gastroesophageal reflux disease    Gout    Hyperkalemia    Hypertension    Osteoarthritis    Postherpetic neuralgia    Shingles    Urinary incontinence    Vitamin D deficiency    Low back pain    Essential (primary) hypertension    Type 2 diabetes mellitus with diabetic chronic kidney disease    Degenerative drusen    Hypermetropia    Pseudophakia    Sprain of left ankle, initial encounter    Nondisplaced fracture of proximal end of left fibula    Encounter for general adult medical examination with abnormal findings    Obesity (BMI 30-39.9)    Splenic cyst    Status post placement of implantable loop recorder    Syncope    CVA (cerebral vascular accident)     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    Macular degeneration     Mild cognitive impairment of uncertain or unknown etiology     Muscle weakness (generalized)     Nondisplaced fracture of proximal end of left  fibula 09/03/2021    Reduced mobility     Repeated falls     Sprain of left ankle, initial encounter 08/20/2021    Syncope     Urinary tract infection     Vitamin D deficiency      Past Surgical History:   Procedure Laterality Date    CHOLECYSTECTOMY  1988    TOTAL ABDOMINAL HYSTERECTOMY  1970      General Information       Row Name 03/13/25 1332          OT Time and Intention    Document Type evaluation  -SP     Mode of Treatment occupational therapy  -SP       Row Name 03/13/25 1332          General Information    Patient Profile Reviewed yes  -SP     Prior Level of Function max assist:;dependent:  Pt feeds self, A for dressing/bathing. Uses w/c, either sully lift or A x2 for transfers, unable to self propel  -SP     Existing Precautions/Restrictions fall  -SP     Barriers to Rehab medically complex;previous functional deficit;cognitive status  -SP       Row Name 03/13/25 1332          Living Environment    Current Living Arrangements extended care facility  -SP     People in Home facility resident;other (see comments)  Son reports pts spouse also lives at [facility] in memory care unit, pt is on the skilled unit  -SP       Row Name 03/13/25 133          Cognition    Orientation Status (Cognition) oriented to;person;disoriented to;place;situation;time  -SP       Row Name 03/13/25 1330          Safety Issues/Impairments Affecting Functional Mobility    Safety Issues Affecting Function (Mobility) insight into deficits/self-awareness;judgment;problem-solving;sequencing abilities;awareness of need for assistance  -SP     Impairments Affecting Function (Mobility) balance;cognition;postural/trunk control;strength;endurance/activity tolerance  -SP     Cognitive Impairments, Mobility Safety/Performance insight into deficits/self-awareness;judgment;problem-solving/reasoning;safety precaution awareness;sequencing abilities  -SP               User Key  (r) = Recorded By, (t) = Taken By, (c) = Cosigned By      Initials Name  Provider Type    SP Refugio Corcoran OT Occupational Therapist                     Mobility/ADL's       Row Name 03/13/25 1341          Bed Mobility    Bed Mobility rolling left;rolling right;scooting/bridging;supine-sit;sit-supine  -SP     Rolling Left [place] (Bed Mobility) minimum assist (75% patient effort);moderate assist (50% patient effort)  -SP     Rolling Right [place] (Bed Mobility) minimum assist (75% patient effort);moderate assist (50% patient effort)  -SP     Scooting/Bridging [place] (Bed Mobility) dependent (less than 25% patient effort);2 person assist  -SP     Supine-Sit [place] (Bed Mobility) maximum assist (25% patient effort);2 person assist  -SP     Sit-Supine [place] (Bed Mobility) maximum assist (25% patient effort);2 person assist  -SP     Assistive Device (Bed Mobility) repositioning sheet;head of bed elevated;bed rails  -SP       Row Name 03/13/25 1341          Transfers    Transfers bed-chair transfer;sit-stand transfer  -SP       Row Name 03/13/25 1341          Bed-Chair Transfer    Bed-Chair [place] (Transfers) not tested  -SP       Row Name 03/13/25 1341          Sit-Stand Transfer    Sit-Stand [place] (Transfers) not tested  -SP       Row Name 03/13/25 1341          Functional Mobility    Patient was able to Ambulate no, other medical factors prevent ambulation  -SP     Reason Patient was unable to Ambulate Non-Ambulatory at Baseline  -SP       Row Name 03/13/25 1341          Activities of Daily Living    BADL Assessment/Intervention lower body dressing;toileting  -SP       Row Name 03/13/25 1341          Lower Body Dressing Assessment/Training    [place] Level (Lower Body Dressing) don;socks;dependent (less than 25% patient effort)  -SP       Row Name 03/13/25 1341          Toileting Assessment/Training    [place] Level (Toileting) perform perineal hygiene;dependent (less than 25% patient effort);adjust/manage clothing  -SP     Position  (Toileting) supine  -SP               User Key  (r) = Recorded By, (t) = Taken By, (c) = Cosigned By      Initials Name Provider Type    Refugio Murillo OT Occupational Therapist                   Obj/Interventions       Row Name 03/13/25 1342          Sensory Assessment (Somatosensory)    Sensory Assessment (Somatosensory) unable/difficult to assess  -SP       Row Name 03/13/25 1342          Vision Assessment/Intervention    Visual Impairment/Limitations unable/difficult to assess  -SP       Row Name 03/13/25 1342          Range of Motion Comprehensive    General Range of Motion bilateral upper extremity ROM WFL  -SP       Row Name 03/13/25 1342          Strength Comprehensive (MMT)    Comment, General Manual Muscle Testing (MMT) Assessment BUE grossly 3/5,  4/5  -SP       Row Name 03/13/25 1342          Balance    Balance Assessment sitting static balance  -SP     Static Sitting Balance moderate assist;other (see comments)  initially with posterior lean requiring mod/max A for support, with time and reaching activity, pt able to sit with SB/CGA at times.  -SP               User Key  (r) = Recorded By, (t) = Taken By, (c) = Cosigned By      Initials Name Provider Type    SP Refugio Corcoran OT Occupational Therapist                   Goals/Plan    No documentation.                  Clinical Impression       Row Name 03/13/25 1343          Pain Scale: FACES Pre/Post-Treatment    Pain: FACES Scale, Pretreatment 0-->no hurt  -SP     Posttreatment Pain Rating 0-->no hurt  -SP       Row Name 03/13/25 1343          Plan of Care Review    Plan of Care Reviewed With patient;son  -     Outcome Evaluation 85 y.o. female with PMH of type 2 diabetes, CKD stage III, anxiety, GERD presented from an extended care facility to the hospital for sudden onset weakness and inability to speak. She had expressive aphasia and hemiparesis on the right side. CT perfusion showed a significant delay in the left MCA distribution and  CTA confirmed an M2 occlusion. TNK administered at 1539 on 3/11/25 and pt admitted to ICU level. At baseline, pt at ECF. She is able to feed herself, though requires assist for ADLs, sully lift at baseline, otherwise heavy x2 assist for transfers. Upon assessment, pt supine in bed and oriented to first/last name only with verbal cueing. Pt completes rolling L/R with min/mod assist to dependently complete alex-care and comes to sitting EOB with max A x2. Pt fluctuates between CGA/max assist to sustain static sitting balance. Max A x2 to return to supine. Pt dependent assist to don socks, though does initiate. Adult son in room present providing PLOF and pt appears near her baseline at this time. OT will sign off and complete orders, please re-consult if pt has a change in status. OT recommending return to SNF.  -SP       Row Name 03/13/25 1343          Therapy Assessment/Plan (OT)    Criteria for Skilled Therapeutic Interventions Met (OT) no  -SP     Therapy Frequency (OT) evaluation only  -SP       Row Name 03/13/25 1343          Therapy Plan Review/Discharge Plan (OT)    Anticipated Discharge Disposition (OT) skilled nursing facility  -SP       Row Name 03/13/25 1343          Vital Signs    Pre Systolic BP Rehab 141  -SP     Pre Treatment Diastolic BP 84  -SP     Intra Systolic BP Rehab 97  -SP     Intra Treatment Diastolic BP 52  -SP     Post Systolic BP Rehab 112  -SP     Post Treatment Diastolic BP 92  -SP     O2 Delivery Pre Treatment room air  -SP     O2 Delivery Intra Treatment room air  -SP     O2 Delivery Post Treatment room air  -SP     Pre Patient Position Supine  -SP     Intra Patient Position Sitting  -SP     Post Patient Position Supine  -SP       Row Name 03/13/25 1343          Positioning and Restraints    Pre-Treatment Position in bed  -SP     Post Treatment Position bed  -SP     In Bed notified nsg;with family/caregiver;side lying left;call light within reach;encouraged to call for assist  -SP                User Key  (r) = Recorded By, (t) = Taken By, (c) = Cosigned By      Initials Name Provider Type    Refugio Murillo OT Occupational Therapist                   Outcome Measures       Row Name 03/13/25 1345          How much help from another is currently needed...    Putting on and taking off regular lower body clothing? 1  -SP     Bathing (including washing, rinsing, and drying) 1  -SP     Toileting (which includes using toilet bed pan or urinal) 1  -SP     Putting on and taking off regular upper body clothing 1  -SP     Taking care of personal grooming (such as brushing teeth) 2  -SP     Eating meals 3  -SP     AM-PAC 6 Clicks Score (OT) 9  -       Row Name 03/13/25 1032 03/13/25 0800       How much help from another person do you currently need...    Turning from your back to your side while in flat bed without using bedrails? 2  - 2  -NH    Moving from lying on back to sitting on the side of a flat bed without bedrails? 2  - 2  -NH    Moving to and from a bed to a chair (including a wheelchair)? 1  - 1  -NH    Standing up from a chair using your arms (e.g., wheelchair, bedside chair)? 1  - 1  -NH    Climbing 3-5 steps with a railing? 1  - 1  -NH    To walk in hospital room? 1  - 1  -NH    AM-PAC 6 Clicks Score (PT) 8  - 8  -NH    Highest Level of Mobility Goal 3 --> Sit at edge of bed  - 3 --> Sit at edge of bed  -NH      Row Name 03/13/25 1032          Modified [place] Scale    Modified [place] Scale 5 - Severe disability.  Bedridden, incontinent, and requiring constant nursing care and attention.  -       Row Name 03/13/25 1345 03/13/25 1032       Functional Assessment    Outcome Measure Options AM-PAC 6 Clicks Daily Activity (OT)  -SP AM-PAC 6 Clicks Basic Mobility (PT);Modified [place]  -              User Key  (r) = Recorded By, (t) = Taken By, (c) = Cosigned By      Initials Name Provider Type    Elsie Castañeda, PT Physical Therapist    Refugio Murillo OT  Occupational Therapist    Sydnee Gerber RN Registered Nurse                    Occupational Therapy Education       Title: PT OT SLP Therapies (Done)       Topic: Occupational Therapy (Done)       Point: ADL training (Done)       Learning Progress Summary            Patient Acceptance, E,TB, VU,NR by SP at 3/13/2025 1345                      Point: Home exercise program (Done)       Learning Progress Summary            Patient Acceptance, E,TB, VU,NR by SP at 3/13/2025 1345                      Point: Precautions (Done)       Learning Progress Summary            Patient Acceptance, E,TB, VU,NR by SP at 3/13/2025 1345                      Point: Body mechanics (Done)       Learning Progress Summary            Patient Acceptance, E,TB, VU,NR by SP at 3/13/2025 1345                                      User Key       Initials Effective Dates Name Provider Type Discipline    SP 11/15/23 -  Refugio Corcoran OT Occupational Therapist OT                  OT Recommendation and Plan  Therapy Frequency (OT): evaluation only  Plan of Care Review  Plan of Care Reviewed With: patient, son  Outcome Evaluation: 85 y.o. female with PMH of type 2 diabetes, CKD stage III, anxiety, GERD presented from an extended care facility to the hospital for sudden onset weakness and inability to speak. She had expressive aphasia and hemiparesis on the right side. CT perfusion showed a significant delay in the left MCA distribution and CTA confirmed an M2 occlusion. TNK administered at 1539 on 3/11/25 and pt admitted to ICU level. At baseline, pt at ECF. She is able to feed herself, though requires assist for ADLs, sully lift at baseline, otherwise heavy x2 assist for transfers. Upon assessment, pt supine in bed and oriented to first/last name only with verbal cueing. Pt completes rolling L/R with min/mod assist to dependently complete alex-care and comes to sitting EOB with max A x2. Pt fluctuates between CGA/max assist to sustain static  sitting balance. Max A x2 to return to supine. Pt dependent assist to don socks, though does initiate. Adult son in room present providing PLOF and pt appears near her baseline at this time. OT will sign off and complete orders, please re-consult if pt has a change in status. OT recommending return to SNF.     Time Calculation:         Time Calculation- OT       Row Name 03/13/25 1345             Time Calculation- OT    OT Start Time 0912  -SP      OT Stop Time 0945  -SP      OT Time Calculation (min) 33 min  -SP      OT Received On 03/13/25  -SP                User Key  (r) = Recorded By, (t) = Taken By, (c) = Cosigned By      Initials Name Provider Type    SP Refugio Corcoran OT Occupational Therapist                  Therapy Charges for Today       Code Description Service Date Service Provider Modifiers Qty    [id number] HC OT EVAL MOD COMPLEXITY 4 3/13/2025 Refugio Corcoran OT GO 1                 Refugio Corcoran OT  3/13/2025

## 2025-03-14 LAB
ALBUMIN SERPL-MCNC: 3.5 G/DL (ref 3.5–5.2)
ALBUMIN SERPL-MCNC: 3.7 G/DL (ref 3.5–5.2)
ALBUMIN/GLOB SERPL: 1.1 G/DL
ALBUMIN/GLOB SERPL: 1.2 G/DL
ALP SERPL-CCNC: 54 U/L (ref 39–117)
ALP SERPL-CCNC: 57 U/L (ref 39–117)
ALT SERPL W P-5'-P-CCNC: 13 U/L (ref 1–33)
ALT SERPL W P-5'-P-CCNC: 15 U/L (ref 1–33)
ANION GAP SERPL CALCULATED.3IONS-SCNC: 11.3 MMOL/L (ref 5–15)
ANION GAP SERPL CALCULATED.3IONS-SCNC: 14.2 MMOL/L (ref 5–15)
AST SERPL-CCNC: 33 U/L (ref 1–32)
AST SERPL-CCNC: 34 U/L (ref 1–32)
BASOPHILS # BLD AUTO: 0.02 10*3/MM3 (ref 0–0.2)
BASOPHILS # BLD AUTO: 0.03 10*3/MM3 (ref 0–0.2)
BASOPHILS NFR BLD AUTO: 0.3 % (ref 0–1.5)
BASOPHILS NFR BLD AUTO: 0.3 % (ref 0–1.5)
BILIRUB SERPL-MCNC: 0.5 MG/DL (ref 0–1.2)
BILIRUB SERPL-MCNC: 0.5 MG/DL (ref 0–1.2)
BUN SERPL-MCNC: 29 MG/DL (ref 8–23)
BUN SERPL-MCNC: 34 MG/DL (ref 8–23)
BUN/CREAT SERPL: 21 (ref 7–25)
BUN/CREAT SERPL: 24.1 (ref 7–25)
CALCIUM SPEC-SCNC: 8.9 MG/DL (ref 8.6–10.5)
CALCIUM SPEC-SCNC: 9 MG/DL (ref 8.6–10.5)
CHLORIDE SERPL-SCNC: 111 MMOL/L (ref 98–107)
CHLORIDE SERPL-SCNC: 114 MMOL/L (ref 98–107)
CO2 SERPL-SCNC: 17.8 MMOL/L (ref 22–29)
CO2 SERPL-SCNC: 23.7 MMOL/L (ref 22–29)
CREAT SERPL-MCNC: 1.38 MG/DL (ref 0.57–1)
CREAT SERPL-MCNC: 1.41 MG/DL (ref 0.57–1)
DEPRECATED RDW RBC AUTO: 46.9 FL (ref 37–54)
DEPRECATED RDW RBC AUTO: 49.1 FL (ref 37–54)
EGFRCR SERPLBLD CKD-EPI 2021: 36.2 ML/MIN/1.73
EGFRCR SERPLBLD CKD-EPI 2021: 37.1 ML/MIN/1.73
EOSINOPHIL # BLD AUTO: 0.12 10*3/MM3 (ref 0–0.4)
EOSINOPHIL # BLD AUTO: 0.12 10*3/MM3 (ref 0–0.4)
EOSINOPHIL NFR BLD AUTO: 1.2 % (ref 0.3–6.2)
EOSINOPHIL NFR BLD AUTO: 1.5 % (ref 0.3–6.2)
ERYTHROCYTE [DISTWIDTH] IN BLOOD BY AUTOMATED COUNT: 13.1 % (ref 12.3–15.4)
ERYTHROCYTE [DISTWIDTH] IN BLOOD BY AUTOMATED COUNT: 13.1 % (ref 12.3–15.4)
GLOBULIN UR ELPH-MCNC: 3.1 GM/DL
GLOBULIN UR ELPH-MCNC: 3.2 GM/DL
GLUCOSE BLDC GLUCOMTR-MCNC: 126 MG/DL (ref 70–105)
GLUCOSE BLDC GLUCOMTR-MCNC: 59 MG/DL (ref 70–105)
GLUCOSE BLDC GLUCOMTR-MCNC: 71 MG/DL (ref 70–105)
GLUCOSE BLDC GLUCOMTR-MCNC: 71 MG/DL (ref 70–105)
GLUCOSE BLDC GLUCOMTR-MCNC: 80 MG/DL (ref 70–105)
GLUCOSE BLDC GLUCOMTR-MCNC: 85 MG/DL (ref 70–105)
GLUCOSE BLDC GLUCOMTR-MCNC: 89 MG/DL (ref 70–105)
GLUCOSE BLDC GLUCOMTR-MCNC: 91 MG/DL (ref 70–105)
GLUCOSE SERPL-MCNC: 286 MG/DL (ref 65–99)
GLUCOSE SERPL-MCNC: 79 MG/DL (ref 65–99)
HCT VFR BLD AUTO: 41.2 % (ref 34–46.6)
HCT VFR BLD AUTO: 44.2 % (ref 34–46.6)
HGB BLD-MCNC: 13.1 G/DL (ref 12–15.9)
HGB BLD-MCNC: 13.6 G/DL (ref 12–15.9)
IMM GRANULOCYTES # BLD AUTO: 0.04 10*3/MM3 (ref 0–0.05)
IMM GRANULOCYTES # BLD AUTO: 0.04 10*3/MM3 (ref 0–0.05)
IMM GRANULOCYTES NFR BLD AUTO: 0.4 % (ref 0–0.5)
IMM GRANULOCYTES NFR BLD AUTO: 0.5 % (ref 0–0.5)
LYMPHOCYTES # BLD AUTO: 2.19 10*3/MM3 (ref 0.7–3.1)
LYMPHOCYTES # BLD AUTO: 2.81 10*3/MM3 (ref 0.7–3.1)
LYMPHOCYTES NFR BLD AUTO: 27.6 % (ref 19.6–45.3)
LYMPHOCYTES NFR BLD AUTO: 27.6 % (ref 19.6–45.3)
MAGNESIUM SERPL-MCNC: 2.1 MG/DL (ref 1.6–2.4)
MAGNESIUM SERPL-MCNC: 2.3 MG/DL (ref 1.6–2.4)
MCH RBC QN AUTO: 31 PG (ref 26.6–33)
MCH RBC QN AUTO: 31.1 PG (ref 26.6–33)
MCHC RBC AUTO-ENTMCNC: 30.8 G/DL (ref 31.5–35.7)
MCHC RBC AUTO-ENTMCNC: 31.8 G/DL (ref 31.5–35.7)
MCV RBC AUTO: 100.9 FL (ref 79–97)
MCV RBC AUTO: 97.6 FL (ref 79–97)
MONOCYTES # BLD AUTO: 0.62 10*3/MM3 (ref 0.1–0.9)
MONOCYTES # BLD AUTO: 0.79 10*3/MM3 (ref 0.1–0.9)
MONOCYTES NFR BLD AUTO: 7.8 % (ref 5–12)
MONOCYTES NFR BLD AUTO: 7.8 % (ref 5–12)
NEUTROPHILS NFR BLD AUTO: 4.94 10*3/MM3 (ref 1.7–7)
NEUTROPHILS NFR BLD AUTO: 6.4 10*3/MM3 (ref 1.7–7)
NEUTROPHILS NFR BLD AUTO: 62.3 % (ref 42.7–76)
NEUTROPHILS NFR BLD AUTO: 62.7 % (ref 42.7–76)
NRBC BLD AUTO-RTO: 0 /100 WBC (ref 0–0.2)
NRBC BLD AUTO-RTO: 0 /100 WBC (ref 0–0.2)
NT-PROBNP SERPL-MCNC: ABNORMAL PG/ML (ref 0–1800)
PHOSPHATE SERPL-MCNC: 2.4 MG/DL (ref 2.5–4.5)
PHOSPHATE SERPL-MCNC: 2.5 MG/DL (ref 2.5–4.5)
PLATELET # BLD AUTO: 168 10*3/MM3 (ref 140–450)
PLATELET # BLD AUTO: 174 10*3/MM3 (ref 140–450)
PMV BLD AUTO: 10.8 FL (ref 6–12)
PMV BLD AUTO: 11.1 FL (ref 6–12)
POTASSIUM SERPL-SCNC: 3.8 MMOL/L (ref 3.5–5.2)
POTASSIUM SERPL-SCNC: 3.9 MMOL/L (ref 3.5–5.2)
PROT SERPL-MCNC: 6.6 G/DL (ref 6–8.5)
PROT SERPL-MCNC: 6.9 G/DL (ref 6–8.5)
RBC # BLD AUTO: 4.22 10*6/MM3 (ref 3.77–5.28)
RBC # BLD AUTO: 4.38 10*6/MM3 (ref 3.77–5.28)
SODIUM SERPL-SCNC: 146 MMOL/L (ref 136–145)
SODIUM SERPL-SCNC: 146 MMOL/L (ref 136–145)
WBC NRBC COR # BLD AUTO: 10.19 10*3/MM3 (ref 3.4–10.8)
WBC NRBC COR # BLD AUTO: 7.93 10*3/MM3 (ref 3.4–10.8)

## 2025-03-14 PROCEDURE — 80053 COMPREHEN METABOLIC PANEL: CPT

## 2025-03-14 PROCEDURE — 84100 ASSAY OF PHOSPHORUS: CPT

## 2025-03-14 PROCEDURE — 82948 REAGENT STRIP/BLOOD GLUCOSE: CPT

## 2025-03-14 PROCEDURE — 83735 ASSAY OF MAGNESIUM: CPT

## 2025-03-14 PROCEDURE — 82948 REAGENT STRIP/BLOOD GLUCOSE: CPT | Performed by: INTERNAL MEDICINE

## 2025-03-14 PROCEDURE — 85025 COMPLETE CBC W/AUTO DIFF WBC: CPT

## 2025-03-14 PROCEDURE — 83880 ASSAY OF NATRIURETIC PEPTIDE: CPT | Performed by: INTERNAL MEDICINE

## 2025-03-14 PROCEDURE — 99222 1ST HOSP IP/OBS MODERATE 55: CPT | Performed by: INTERNAL MEDICINE

## 2025-03-14 PROCEDURE — 82948 REAGENT STRIP/BLOOD GLUCOSE: CPT | Performed by: STUDENT IN AN ORGANIZED HEALTH CARE EDUCATION/TRAINING PROGRAM

## 2025-03-14 RX ADMIN — MUPIROCIN 1 APPLICATION: 20 OINTMENT TOPICAL at 08:59

## 2025-03-14 RX ADMIN — MUPIROCIN 1 APPLICATION: 20 OINTMENT TOPICAL at 21:05

## 2025-03-14 RX ADMIN — Medication 10 ML: at 21:05

## 2025-03-14 RX ADMIN — SENNOSIDES AND DOCUSATE SODIUM 2 TABLET: 50; 8.6 TABLET ORAL at 21:05

## 2025-03-14 RX ADMIN — Medication 10 ML: at 08:59

## 2025-03-14 RX ADMIN — DEXTROSE MONOHYDRATE 25 G: 25 INJECTION, SOLUTION INTRAVENOUS at 21:26

## 2025-03-14 RX ADMIN — ATORVASTATIN CALCIUM 80 MG: 40 TABLET, FILM COATED ORAL at 21:05

## 2025-03-14 RX ADMIN — ASPIRIN 325 MG ORAL TABLET 325 MG: 325 PILL ORAL at 08:59

## 2025-03-14 NOTE — PROGRESS NOTES
[facility] MEDICINE SERVICE  DAILY PROGRESS NOTE    NAME: Lynda Patterson  : 1940  MRN: [id number]      LOS: 3 days     PROVIDER OF SERVICE: Valentin Burrell MD    Chief Complaint: CVA (cerebral vascular accident)    Subjective:   Patient alert and oriented to self. She is at her baseline as per discussion with her son and daughter in law at bed side.        Review of Systems:   All 21 ROS were negative except mentioned above.    Objective:     Vital Signs  Temp:  [96.7 °F (35.9 °C)-98 °F (36.7 °C)] 96.7 °F (35.9 °C)  Heart Rate:  [64-78] 64  Resp:  [11-18] 16  BP: (114-154)/(63-75) 146/64   Body mass index is 30.01 kg/m².    Physical Exam   General: Alert and oriented to self, no acute distress. Mentation at baseline as per family   Lungs: Clear to auscultation, nonlabored respiration.  Heart: RRR  Abdomen: Soft, nontender, nondistended, + bowel sounds.  Neuro: alert and awake, moving all 4 extremities        Scheduled Meds   aspirin, 325 mg, Oral, Daily   Or  aspirin, 300 mg, Rectal, Daily  atorvastatin, 80 mg, Oral, Nightly  [Held by provider] buPROPion XL, 150 mg, Oral, Daily  [Held by provider] busPIRone, 10 mg, Oral, TID  insulin lispro, 2-7 Units, Subcutaneous, 4x Daily With Meals & Nightly  mupirocin, 1 Application, Each Nare, BID  senna-docusate sodium, 2 tablet, Oral, BID  [Held by provider] sertraline, 25 mg, Oral, Daily  sodium chloride, 10 mL, Intravenous, Q12H  sodium chloride, 10 mL, Intravenous, Q12H       PRN Meds     aluminum-magnesium hydroxide-simethicone    senna-docusate sodium **AND** polyethylene glycol **AND** bisacodyl **AND** bisacodyl    Calcium Replacement - Follow Nurse / BPA Driven Protocol    dextrose    dextrose    glucagon (human recombinant)    hydrALAZINE    Magnesium Low Dose Replacement - Follow Nurse / BPA Driven Protocol    nitroglycerin    Phosphorus Replacement - Follow Nurse / BPA Driven Protocol    Potassium Replacement - Follow Nurse / BPA  Driven Protocol    prochlorperazine    sodium chloride    sodium chloride    sodium chloride    sodium chloride    sodium chloride    [Held by provider] traMADol   Infusions         Diagnostic Data    Results from last 7 days   Lab Units 03/14/25  0419   WBC 10*3/mm3 10.19   HEMOGLOBIN g/dL 13.6   HEMATOCRIT % 44.2   PLATELETS 10*3/mm3 168   GLUCOSE mg/dL 79   CREATININE mg/dL 1.41*   BUN mg/dL 34*   SODIUM mmol/L 146*   POTASSIUM mmol/L 3.9   AST (SGOT) U/L 33*   ALT (SGPT) U/L 13   ALK PHOS U/L 54   BILIRUBIN mg/dL 0.5   ANION GAP mmol/L 14.2       CT Head Without Contrast  Result Date: 3/12/2025  Impression: No acute intracranial findings. Electronically Signed: Liang Key  3/12/2025 5:31 PM EDT  Workstation ID: [id number]      Interval results reviewed.    Assessment/Plan:   Acute ischemic stroke status post tenecteplase on 3/11/2025  HF reduced ejection fraction.  Diabetes  SONYA on CKD 3  GERD  Elevated anion gap    Neurology following the patient.  Recommendations noted.  Continue aspirin statins  2D echo on 3/12/2025 showed ejection fraction of 36 to 40% with hypokinesis  Loop recorder in Place  Will consult cardiology for new ECHO changes   Monitor renal function and avoid nephrotoxic medications and NSAIDs.  Treatment plan discussed with RN.     VTE Prophylaxis:  Mechanical VTE prophylaxis orders are present.         Code status is   Code Status and Medical Interventions: No CPR (Do Not Attempt to Resuscitate); Limited Support; No intubation (DNI)   Ordered at: 03/11/25 2230     Code Status (Patient has no pulse and is not breathing):    No CPR (Do Not Attempt to Resuscitate)     Medical Interventions (Patient has pulse or is breathing):    Limited Support     Medical Intervention Limits:    No intubation (DNI)     Level Of Support Discussed With:    Health Care Surrogate       Plan for disposition:     Barriers to Discharge: Stroke workup, cardio eval  Anticipated Date of Discharge: 3/15/2025  Place of  Discharge: Skilled nursing facility      Time: 40 minutes     Signature: Electronically signed by Valentin Burrell MD, 03/14/25, 14:14 EDT.  [facility] Hospitalist Team

## 2025-03-14 NOTE — CONSULTS
Cardiology Consult Note    Patient Identification:  Name: Lynda Patterson  Age: 85 y.o.  Sex: female  :  1940  MRN: [id number]             Requesting Physician :  Valentin Burrell MD     Reason for Consultation / Chief Complaint :   CVA, LV dysfunction    History of Present Illness:           Ms. Lynda Patterson has PMH of     CAD, details of which are not available--Lexiscan Cardiolite 2022 negative for ischemia EF of 77%  Recurrent syncope, Biotronik ILR 3/14/2023  Hypertension  Dyslipidemia  Type 2 diabetes  CKD  Cholecystectomy, hysterectomy  Non-smoker  Allergies/intolerance to Cipro, penicillin, sulfa  Positive family history of premature CAD in patient's son    Presented through emergency room 3/11/2025 with new onset expressive aphasia and hemiparesis on right side.  Code stroke was called and patient received TNK tPA.  Has partial resolution of symptoms.  Workup revealed LV dysfunction therefore cardiology is consulted.  Patient is more awake denies any chest pain or shortness of breath.    Data: Labs from 3/11/2025 - 3/14/2025: proBNP is elevated at 13,661.  Creatinine is elevated at 1.71 on admission and today is 1.41.  Hemoglobin A1c of 6.08.  Normal TSH, CBC  CT head 3/12/2025: No acute intracranial abnormality  MRI 3/11/2025 reveals atrophy and chronic microvascular ischemic changes no acute intracranial process.  EKG done 3/11/2025 reviewed/interpreted by me reveals sinus rhythm with rate of 84 bpm  Echocardiogram 3/12/2025 reviewed/interpreted by me reveals LV dysfunction with EF of 36 to 40% with regional wall motion abnormality.  Grade 1 diastolic dysfunction           Assessment:  :     Acute CVA  New onset LV dysfunction, with regional wall motion abnormalities consistent with ischemic cardiomyopathy.  Bradycardia  Dyspnea on exertion  CAD  Hypertension  Dyslipidemia  Diabetes  CKD Dr. Reid  Obesity with BMI over 30  Positive family history of premature  CAD     Recommendations / Plan:            Patient underwent Lexiscan Cardiolite reviewed/interpreted by me 8/12/2022 which revealed normal perfusion EF of 77%.  Patient is bradycardic.  Had previous loop recorder, UltiZenronik loop recorder.  Loop recorder was checked today and is not revealing any arrhythmias.  Discussed with patient's daughter in law and son by bedside.  They do not want any LEEROY or anything aggressive/invasive..  Continue medical management with aspirin, atorvastatin.  Patient blood pressure is labile.  Has been as low as 92/32 and 1 45/64.  Patient has LV dysfunction will benefit from guideline directed medical therapy with Entresto.  But patient's blood pressure is labile now.  Will follow-up as outpatient and start but blood pressure stable.  Will continue to monitor and restart meds when stable.         Cardiographics  ECG: EKG tracing was  personally reviewed/interpreted by me  ECG 12 Lead Stroke Evaluation   Final Result   HEART RATE=84  bpm   RR Aapcadwb=815  ms   MN Srsqnpor=650  ms   P Horizontal Axis=-15  deg   P Front Axis=39  deg   QRSD Kjvclmax=502  ms   QT Xweihiom=228  ms   WYiE=737  ms   QRS Axis=137  deg   T Wave Axis=235  deg   - ABNORMAL ECG -   Sinus rhythm   Low voltage, extremity leads   Abnormal T, consider ischemia, diffuse leads   Prolonged QT interval   No previous ECG available for comparison   Electronically Signed By: Vincent Sanchez (LAURY) 2025-03-12 06:10:21   Date and Time of Study:2025-03-11 16:14:14      Telemetry Scan   Final Result      Telemetry Scan   Final Result      Telemetry Scan   Final Result      Telemetry Scan   Final Result      Telemetry Scan   Final Result      Telemetry Scan   Final Result      Telemetry Scan   Final Result          Telemetry: Sinus rhythm             Diagnosis Plan   1. 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    Macular degeneration     Mild cognitive impairment of uncertain or unknown etiology     Muscle weakness (generalized)     Nondisplaced fracture of proximal end of left fibula 09/03/2021    Reduced mobility     Repeated falls     Sprain of left ankle, initial encounter 08/20/2021    Syncope     Urinary tract infection     Vitamin D deficiency      Past Surgical History:  Past Surgical History:   Procedure Laterality Date    CHOLECYSTECTOMY  1988    TOTAL ABDOMINAL HYSTERECTOMY  1970      Allergies:  Allergies   Allergen Reactions    Ciprofloxacin Nausea And Vomiting    Penicillin G Nausea And Vomiting    Sulfa Antibiotics Nausea And Vomiting     Home Meds:  Medications Prior to Admission   Medication Sig Dispense Refill Last Dose/Taking    atorvastatin (LIPITOR) 10 MG tablet Take 1 tablet by mouth Daily.   3/10/2025    buPROPion XL (WELLBUTRIN XL) 150 MG 24 hr tablet Take 1 tablet by mouth Daily.   3/11/2025    busPIRone (BUSPAR) 10 MG tablet Take 1 tablet by mouth 3 (Three) Times a Day.   3/11/2025    cephalexin (KEFLEX) 250 MG capsule Take 1 capsule by mouth Daily.   3/10/2025    ferrous sulfate 325 (65 FE) MG tablet Take 1 tablet by mouth Daily.   3/11/2025    Hydrocortisone (Hermelinda's magic butt cream) Apply 1 Application topically to the appropriate area as directed 4 (Four) Times a Day As Needed.   3/11/2025    sertraline (ZOLOFT) 25 MG tablet Take 1 tablet by mouth Daily.   3/11/2025    acetaminophen (TYLENOL) 325 MG tablet Take 2 tablets by mouth Every 6 (Six) Hours As Needed for Mild Pain.       bismuth subsalicylate (PEPTO BISMOL) 262 MG/15ML suspension Take 15 mL by mouth Daily As Needed for Indigestion.        camphor-menthol (SARNA) 0.5-0.5 % lotion Apply 1 Application topically to the appropriate area as directed 2 (Two) Times a Day As Needed for Itching.       guaifenesin (ROBITUSSIN) 100 MG/5ML liquid Take 10 mL by mouth Every 6 (Six) Hours As Needed for Cough.       loperamide (IMODIUM) 2 MG capsule Take 1 capsule by mouth Every 6 (Six) Hours As Needed for Diarrhea.       midodrine (PROAMATINE) 5 MG tablet Take 1 tablet by mouth 2 (Two) Times a Day As Needed.       traMADol (ULTRAM) 50 MG tablet Take 1 tablet by mouth 2 (Two) Times a Day As Needed for Moderate Pain.       vitamin D (ERGOCALCIFEROL) 1.25 MG (72039 UT) capsule capsule Take 1 capsule by mouth Every 30 (Thirty) Days. First Wednesday of month   3/5/2025     Current Meds:     Current Facility-Administered Medications:     aluminum-magnesium hydroxide-simethicone (MAALOX MAX) 400-400-40 MG/5ML suspension 15 mL, 15 mL, Oral, Q6H PRN, Early, Gabriella A, APRN    aspirin tablet 325 mg, 325 mg, Oral, Daily, 325 mg at 03/14/25 0859 **OR** aspirin suppository 300 mg, 300 mg, Rectal, Daily, Early, Gabriella VALDERRAMA APRN    atorvastatin (LIPITOR) tablet 80 mg, 80 mg, Oral, Nightly, Early, Gabriella A, APRN, 80 mg at 03/13/25 2102    sennosides-docusate (PERICOLACE) 8.6-50 MG per tablet 2 tablet, 2 tablet, Oral, BID, 2 tablet at 03/13/25 2102 **AND** polyethylene glycol (MIRALAX) packet 17 g, 17 g, Oral, Daily PRN **AND** bisacodyl (DULCOLAX) EC tablet 5 mg, 5 mg, Oral, Daily PRN **AND** bisacodyl (DULCOLAX) suppository 10 mg, 10 mg, Rectal, Daily PRN, Early, Gabriella A, APRN    [Held by provider] buPROPion XL (WELLBUTRIN XL) 24 hr tablet 150 mg, 150 mg, Oral, Daily, Early, Gabriella VALDERRAMA APRZUNILDA    [Held by provider] busPIRone (BUSPAR) tablet 10 mg, 10 mg, Oral, TID, Early, SHABANA Padilla    Calcium Replacement - Follow Nurse / BPA Driven Protocol, , Not Applicable, PRN, Gabriella Roth APRN    dextrose (D50W) (25 g/50 mL) IV injection 25 g, 25 g, Intravenous, Q15 Min PRN, Gabriella Roth  APRN, 25 g at 03/11/25 2241    dextrose (GLUTOSE) oral gel 15 g, 15 g, Oral, Q15 Min PRN, Early, SHABANA Padilla    glucagon (GLUCAGEN) injection 1 mg, 1 mg, Intramuscular, Q15 Min PRN, Early, Gabriella VALDERRAMA APRN    hydrALAZINE (APRESOLINE) injection 10 mg, 10 mg, Intravenous, Q4H PRN, Early, Gabriella VALDERRAMA APRN    insulin lispro (HUMALOG/ADMELOG) injection 2-7 Units, 2-7 Units, Subcutaneous, 4x Daily With Meals & Nightly, Shanta Bowser MD    Magnesium Low Dose Replacement - Follow Nurse / BPA Driven Protocol, , Not Applicable, PRN, Early, SHABANA Padilla    mupirocin (BACTROBAN) 2 % nasal ointment 1 Application, 1 Application, Each Nare, BID, EarlyGabriella APRN, 1 Application at 03/14/25 0859    nitroglycerin (NITROSTAT) SL tablet 0.4 mg, 0.4 mg, Sublingual, Q5 Min PRN, EarlyGabriella APRZUNILDA    Phosphorus Replacement - Follow Nurse / BPA Driven Protocol, , Not Applicable, PRN, Early, Gabriella VALDERRAMA APRN    Potassium Replacement - Follow Nurse / BPA Driven Protocol, , Not Applicable, PRN, Early, SHABANA Padilla    prochlorperazine (COMPAZINE) injection 5 mg, 5 mg, Intravenous, Q6H PRN, Early, Gabriella VALDERRAMA APRN    [Held by provider] sertraline (ZOLOFT) tablet 25 mg, 25 mg, Oral, Daily, EarlyGabriella APRN    sodium chloride 0.9 % flush 10 mL, 10 mL, Intravenous, PRN, Vincent Sanchez MD    sodium chloride 0.9 % flush 10 mL, 10 mL, Intravenous, Q12H, EarlyGabriella APRN, 10 mL at 03/14/25 0859    sodium chloride 0.9 % flush 10 mL, 10 mL, Intravenous, PRN, EarlyGabriella APRN    sodium chloride 0.9 % flush 10 mL, 10 mL, Intravenous, Q12H, EarlyGabriella APRN, 10 mL at 03/14/25 0859    sodium chloride 0.9 % flush 10 mL, 10 mL, Intravenous, PRN, Early, Gabriella A, APRN    sodium chloride 0.9 % infusion 40 mL, 40 mL, Intravenous, PRN, Early, Gabriella A, APRN    sodium chloride 0.9 % infusion 40 mL, 40 mL, Intravenous, PRN, Early, Gabriella A, APRN    [Held by provider] traMADol (ULTRAM) tablet 50 mg, 50 mg, Oral, BID PRN, Early, Gabreilla A, APRN  Social  "History:   Social History     Tobacco Use    Smoking status: Never     Passive exposure: Never    Smokeless tobacco: Never   Substance Use Topics    Alcohol use: No      Family History:  Family History   Problem Relation Age of Onset    Stroke Mother     Stroke Father     Heart disease Brother     Heart attack Son     Heart disease Son         Review of Systems : Review of Systems   Unable to perform ROS: Mental status change            Constitutional:  Temp:  [96.8 °F (36 °C)-98 °F (36.7 °C)] 97.7 °F (36.5 °C)  Heart Rate:  [68-78] 78  Resp:  [11-18] 14  BP: (111-154)/(63-84) 125/68    Physical Exam   /68 (BP Location: Right arm, Patient Position: Lying)   Pulse 78   Temp 97.7 °F (36.5 °C) (Oral)   Resp 14   Ht 149.9 cm (59\")   Wt 67.4 kg (148 lb 9.4 oz)   SpO2 94%   BMI 30.01 kg/m²   Physical Exam  General:  Appears in no acute distress  Eyes: Sclerae are anicteric,  conjunctivae are clear   HEENT:  No JVD. Thyroid not visibly enlarged. No mucosal pallor or cyanosis  Respiratory: Respirations regular and unlabored at rest.  Clear to auscultation  Cardiovascular: S1,S2 Regular rate and rhythm.  No murmur  Gastrointestinal: Abdomen nondistended.  Musculoskeletal:  No abnormal movements  Extremities: No digital clubbing or cyanosis  Skin: Color pink. Skin warm and dry to touch.   Neuro: Alert and awake.        Echocardiogram:   Results for orders placed during the hospital encounter of 03/11/25    Adult Transthoracic Echo Complete W/ Cont if Necessary Per Protocol    Interpretation Summary    Left ventricular systolic function is moderately decreased. Left ventricular ejection fraction appears to be 36 - 40%.    The following left ventricular wall segments are hypokinetic: mid anterior, mid anterolateral, mid inferolateral, mid inferior, mid inferoseptal and mid anteroseptal. The following left ventricular wall segments are akinetic: apical anterior, apical lateral, apical inferior, apical septal and " apex.    Left ventricular diastolic function is consistent with (grade Ia w/high LAP) impaired relaxation.    Estimated right ventricular systolic pressure from tricuspid regurgitation is normal (<35 mmHg).      STRESS TEST  Results for orders placed during the hospital encounter of 22    Stress Test With Myocardial Perfusion One Day    Interpretation Summary  LEXISCAN CARDIOLITE REPORT    DATE OF PROCEDURE: 2022    INDICATION FOR PROCEDURE: Recurrent syncope, dyspnea on exertion, bradycardia, hypertension, and diabetes, dyslipidemia, CKD    PROCEDURE PERFORMED: Lexiscan Cardiolite    PROCEDURE COMMENTS:    After informed consent was obtained.  Stress test was supervised by nurse practitioner. Patient's resting heart rate was 53 bpm, resting blood pressure was 134/84, resting EKG revealed sinus bradycardia at the rate of 53 bpm with poor R wave progression.  Patient was given 0.4 mg of regadenosine for stress testing.  There was no significant change in heart rate, blood pressure, symptoms with regadenoson injection.  Patient tolerated procedure well.  Complications were none.    NUCLEAR IMAGIN.  There was   uniform uptake of Cardiolite both in resting and post stress images, no ischemia seen.  2.  Gated images reveal  normal LV size and contractility, LVEF of 77%.    CONCLUSION:  1.  Lexiscan Cardiolite with normal perfusion, negative for ischemia.  2.  Normal wall motion.  LVEF of 77%.    RECOMMENDATIONS:    Clinical correlation recommended.      Deric Johnson MD  22  19:51 EDT        Cardiolite (Tc-99m sestamibi) stress test    HEART CATHETERIZATION  No results found for this or any previous visit.        Imaging  Chest X-ray:   Imaging Results (Last 24 Hours)       ** No results found for the last 24 hours. **            Lab Review: I have reviewed the labs      Results from last 7 days   Lab Units 25  0419   MAGNESIUM mg/dL 2.3     Results from last 7 days   Lab Units  03/14/25  0419   SODIUM mmol/L 146*   POTASSIUM mmol/L 3.9   BUN mg/dL 34*   CREATININE mg/dL 1.41*   CALCIUM mg/dL 9.0             Results from last 7 days   Lab Units 03/14/25  0419 03/13/25  0328 03/12/25  0459   WBC 10*3/mm3 10.19 9.83 8.97   HEMOGLOBIN g/dL 13.6 13.1 13.4   HEMATOCRIT % 44.2 41.9 42.3   PLATELETS 10*3/mm3 168 171 177     Results from last 7 days   Lab Units 03/11/25  1522   INR  1.16*   APTT seconds 29.1             Deric Johnson MD  3/14/2025, 10:06 EDT      EMR Dragon/Transcription:   Dictated utilizing Dragon dictation

## 2025-03-14 NOTE — PLAN OF CARE
Problem: Adult Inpatient Plan of Care  Goal: Plan of Care Review  Outcome: Progressing  Goal: Patient-Specific Goal (Individualized)  Outcome: Progressing  Goal: Absence of Hospital-Acquired Illness or Injury  Outcome: Progressing  Intervention: Identify and Manage Fall Risk  Recent Flowsheet Documentation  Taken 3/14/2025 1600 by Sydnee Pleitez RN  Safety Promotion/Fall Prevention:   activity supervised   assistive device/personal items within reach   clutter free environment maintained   fall prevention program maintained   room organization consistent   safety round/check completed  Taken 3/14/2025 1400 by Sydnee Pleitez RN  Safety Promotion/Fall Prevention:   activity supervised   assistive device/personal items within reach   clutter free environment maintained   fall prevention program maintained   nonskid shoes/slippers when out of bed   room organization consistent   safety round/check completed  Taken 3/14/2025 1200 by Sydnee Pleitez RN  Safety Promotion/Fall Prevention:   assistive device/personal items within reach   clutter free environment maintained   fall prevention program maintained   nonskid shoes/slippers when out of bed   room organization consistent   safety round/check completed  Taken 3/14/2025 1000 by Sydnee Pleitez RN  Safety Promotion/Fall Prevention:   activity supervised   assistive device/personal items within reach   clutter free environment maintained   fall prevention program maintained   nonskid shoes/slippers when out of bed   room organization consistent   safety round/check completed  Taken 3/14/2025 0800 by Sydnee Pleitez RN  Safety Promotion/Fall Prevention:   activity supervised   assistive device/personal items within reach   clutter free environment maintained   fall prevention program maintained   safety round/check completed   room organization consistent   nonskid shoes/slippers when out of bed  Intervention: Prevent Skin Injury  Recent Flowsheet  Documentation  Taken 3/14/2025 0800 by Sydnee Pleitez RN  Body Position:   turned   right   legs elevated  Skin Protection: incontinence pads utilized  Intervention: Prevent and Manage VTE (Venous Thromboembolism) Risk  Recent Flowsheet Documentation  Taken 3/14/2025 0800 by Sydnee Pleitez RN  VTE Prevention/Management:   bilateral   SCDs (sequential compression devices) on  Intervention: Prevent Infection  Recent Flowsheet Documentation  Taken 3/14/2025 1600 by Sydnee Pleitez RN  Infection Prevention: environmental surveillance performed  Taken 3/14/2025 1400 by Sydnee Pleitez RN  Infection Prevention:   environmental surveillance performed   equipment surfaces disinfected   hand hygiene promoted   personal protective equipment utilized   single patient room provided   rest/sleep promoted  Taken 3/14/2025 1200 by Sydnee Pleitez RN  Infection Prevention: hand hygiene promoted  Taken 3/14/2025 1000 by Sydnee Pleitez RN  Infection Prevention:   environmental surveillance performed   equipment surfaces disinfected   personal protective equipment utilized   hand hygiene promoted   rest/sleep promoted   single patient room provided  Taken 3/14/2025 0800 by Sydnee Pleitez RN  Infection Prevention: hand hygiene promoted  Goal: Optimal Comfort and Wellbeing  Outcome: Progressing  Intervention: Provide Person-Centered Care  Recent Flowsheet Documentation  Taken 3/14/2025 1200 by Sydnee Pleitez RN  Trust Relationship/Rapport: care explained  Taken 3/14/2025 0800 by Sydnee Pleitez RN  Trust Relationship/Rapport:   care explained   thoughts/feelings acknowledged  Goal: Readiness for Transition of Care  Outcome: Progressing     Problem: Fall Injury Risk  Goal: Absence of Fall and Fall-Related Injury  Outcome: Progressing  Intervention: Identify and Manage Contributors  Recent Flowsheet Documentation  Taken 3/14/2025 1600 by Sydnee Pleitez RN  Medication Review/Management: medications reviewed  Taken 3/14/2025  1400 by Sydnee Pleitez RN  Medication Review/Management: medications reviewed  Taken 3/14/2025 1200 by Sydnee Pleitez RN  Medication Review/Management: medications reviewed  Taken 3/14/2025 1000 by Sydnee Pleitez RN  Medication Review/Management: medications reviewed  Taken 3/14/2025 0800 by Sydnee Pleitez RN  Medication Review/Management: medications reviewed  Intervention: Promote Injury-Free Environment  Recent Flowsheet Documentation  Taken 3/14/2025 1600 by Sydnee Pleitez RN  Safety Promotion/Fall Prevention:   activity supervised   assistive device/personal items within reach   clutter free environment maintained   fall prevention program maintained   room organization consistent   safety round/check completed  Taken 3/14/2025 1400 by Sydnee Pleitez RN  Safety Promotion/Fall Prevention:   activity supervised   assistive device/personal items within reach   clutter free environment maintained   fall prevention program maintained   nonskid shoes/slippers when out of bed   room organization consistent   safety round/check completed  Taken 3/14/2025 1200 by Sydnee Pleitez RN  Safety Promotion/Fall Prevention:   assistive device/personal items within reach   clutter free environment maintained   fall prevention program maintained   nonskid shoes/slippers when out of bed   room organization consistent   safety round/check completed  Taken 3/14/2025 1000 by Sydnee Pleitez RN  Safety Promotion/Fall Prevention:   activity supervised   assistive device/personal items within reach   clutter free environment maintained   fall prevention program maintained   nonskid shoes/slippers when out of bed   room organization consistent   safety round/check completed  Taken 3/14/2025 0800 by Sydnee Pleitez RN  Safety Promotion/Fall Prevention:   activity supervised   assistive device/personal items within reach   clutter free environment maintained   fall prevention program maintained   safety round/check  completed   room organization consistent   nonskid shoes/slippers when out of bed     Problem: Stroke, Ischemic (Includes Transient Ischemic Attack)  Goal: Optimal Coping  Outcome: Progressing  Intervention: Support Psychosocial Response to Stroke  Recent Flowsheet Documentation  Taken 3/14/2025 1200 by Sydnee Pleitez RN  Family/Support System Care: support provided  Taken 3/14/2025 0800 by Sydnee Pleitez RN  Family/Support System Care: support provided  Goal: Effective Bowel Elimination  Outcome: Progressing  Goal: Optimal Cerebral Tissue Perfusion  Outcome: Progressing  Intervention: Protect and Optimize Cerebral Perfusion  Recent Flowsheet Documentation  Taken 3/14/2025 1600 by Sydnee Pleitez RN  Cerebral Perfusion Promotion: blood pressure monitored  Taken 3/14/2025 0800 by Sydnee Pleitez RN  Sensory Stimulation Regulation: television on  Cerebral Perfusion Promotion: blood pressure monitored  Goal: Optimal Cognitive Function  Outcome: Progressing  Intervention: Optimize Cognitive Function  Recent Flowsheet Documentation  Taken 3/14/2025 0800 by Sydnee Pleitez RN  Sensory Stimulation Regulation: television on  Reorientation Measures:   clock in view   calendar in view  Goal: Improved Communication Skills  Outcome: Progressing  Intervention: Optimize Communication Skills  Recent Flowsheet Documentation  Taken 3/14/2025 0800 by Sydnee Pleitez RN  Communication Enhancement Strategies: extra time allowed for response  Goal: Optimal Functional Ability  Outcome: Progressing  Intervention: Optimize Functional Ability  Recent Flowsheet Documentation  Taken 3/14/2025 0800 by Sydnee Pleitez RN  Activity Management: bedrest  Goal: Optimal Nutrition Intake  Outcome: Progressing  Goal: Effective Oxygenation and Ventilation  Outcome: Progressing  Intervention: Optimize Oxygenation and Ventilation  Recent Flowsheet Documentation  Taken 3/14/2025 0800 by Sydnee Pleitez RN  Head of Bed (HOB) Positioning: HOB  elevated  Goal: Improved Sensorimotor Function  Outcome: Progressing  Intervention: Optimize Range of Motion, Motor Control and Function  Recent Flowsheet Documentation  Taken 3/14/2025 0800 by Sydnee Pleitez RN  Positioning/Transfer Devices:   wedge   pillows   in use  Intervention: Optimize Sensory and Perceptual Ability  Recent Flowsheet Documentation  Taken 3/14/2025 0800 by Sydnee Pleitez RN  Pressure Reduction Techniques: weight shift assistance provided  Pressure Reduction Devices: pressure-redistributing mattress utilized  Goal: Safe and Effective Swallow  Outcome: Progressing  Goal: Effective Urinary Elimination  Outcome: Progressing     Problem: Skin Injury Risk Increased  Goal: Skin Health and Integrity  Outcome: Progressing  Intervention: Optimize Skin Protection  Recent Flowsheet Documentation  Taken 3/14/2025 0800 by Sydnee Pleitez RN  Activity Management: bedrest  Pressure Reduction Techniques: weight shift assistance provided  Head of Bed (HOB) Positioning: HOB elevated  Pressure Reduction Devices: pressure-redistributing mattress utilized  Skin Protection: incontinence pads utilized     Problem: Comorbidity Management  Goal: Blood Glucose Level Within Target Range  Outcome: Progressing  Intervention: Monitor and Manage Glycemia  Recent Flowsheet Documentation  Taken 3/14/2025 1600 by Sydnee Pleitez RN  Medication Review/Management: medications reviewed  Taken 3/14/2025 1400 by Sydnee Pleitez RN  Medication Review/Management: medications reviewed  Taken 3/14/2025 1200 by Sydnee Pleitez RN  Medication Review/Management: medications reviewed  Taken 3/14/2025 1000 by Sydnee Pleitez RN  Medication Review/Management: medications reviewed  Taken 3/14/2025 0800 by Sydnee Pleitez RN  Medication Review/Management: medications reviewed     Problem: Violence Risk or Actual  Goal: Anger and Impulse Control  Outcome: Progressing  Intervention: Minimize Safety Risk  Recent Flowsheet  Documentation  Taken 3/14/2025 1600 by Sydnee Pleitez RN  Enhanced Safety Measures: bed alarm set  Taken 3/14/2025 1400 by Sydnee Pleitez RN  Enhanced Safety Measures: bed alarm set  Taken 3/14/2025 1200 by Sydnee Pleitez RN  Enhanced Safety Measures: bed alarm set  Taken 3/14/2025 1000 by Sydnee Pleitez RN  Enhanced Safety Measures: bed alarm set  Taken 3/14/2025 0800 by Sydnee Pleitez RN  Sensory Stimulation Regulation: television on  Enhanced Safety Measures: bed alarm set   Goal Outcome Evaluation:

## 2025-03-14 NOTE — PLAN OF CARE
Goal Outcome Evaluation:  Plan of Care Reviewed With: patient        Progress: improving  Outcome Evaluation: Patient is alert but confused. Refused to be fed. Dislike pureed diet and nectal thick liquid. Patient was encouraged to eat. Blood glucose closely monitored.

## 2025-03-15 LAB
GLUCOSE BLDC GLUCOMTR-MCNC: 109 MG/DL (ref 70–105)
GLUCOSE BLDC GLUCOMTR-MCNC: 130 MG/DL (ref 70–105)
GLUCOSE BLDC GLUCOMTR-MCNC: 74 MG/DL (ref 70–105)
GLUCOSE BLDC GLUCOMTR-MCNC: 93 MG/DL (ref 70–105)

## 2025-03-15 PROCEDURE — 82948 REAGENT STRIP/BLOOD GLUCOSE: CPT | Performed by: INTERNAL MEDICINE

## 2025-03-15 PROCEDURE — 82948 REAGENT STRIP/BLOOD GLUCOSE: CPT

## 2025-03-15 PROCEDURE — 99232 SBSQ HOSP IP/OBS MODERATE 35: CPT | Performed by: INTERNAL MEDICINE

## 2025-03-15 RX ADMIN — ATORVASTATIN CALCIUM 80 MG: 40 TABLET, FILM COATED ORAL at 20:49

## 2025-03-15 RX ADMIN — MUPIROCIN 1 APPLICATION: 20 OINTMENT TOPICAL at 08:42

## 2025-03-15 RX ADMIN — Medication 10 ML: at 08:43

## 2025-03-15 RX ADMIN — SENNOSIDES AND DOCUSATE SODIUM 2 TABLET: 50; 8.6 TABLET ORAL at 20:49

## 2025-03-15 RX ADMIN — SENNOSIDES AND DOCUSATE SODIUM 2 TABLET: 50; 8.6 TABLET ORAL at 08:42

## 2025-03-15 RX ADMIN — Medication 10 ML: at 08:42

## 2025-03-15 RX ADMIN — ASPIRIN 325 MG ORAL TABLET 325 MG: 325 PILL ORAL at 08:42

## 2025-03-15 RX ADMIN — MUPIROCIN 1 APPLICATION: 20 OINTMENT TOPICAL at 20:53

## 2025-03-15 RX ADMIN — Medication 10 ML: at 20:53

## 2025-03-15 NOTE — CASE MANAGEMENT/SOCIAL WORK
Continued Stay Note  [facility]     Patient Name: Lynda Patterson  MRN: [id number]  Today's Date: 3/15/2025    Admit Date: 3/11/2025    Plan: From [facility].  Returning Skilled. Pre-cert started 3/15. Return PASRR completed.   Discharge Plan       Row Name 03/15/25 0426       Plan    Plan Comments Per facility liaison, patient is able to return to facility as soon as pre-cert is approved. Still pending as of 3/15 1800.                  Minnie Martino RN     Office: [phone]  Fax: [phone]

## 2025-03-15 NOTE — CASE MANAGEMENT/SOCIAL WORK
Continued Stay Note   Yrn     Patient Name: Lynda Patterson  MRN: [id number]  Today's Date: 3/15/2025    Admit Date: 3/11/2025    Plan: From [facility].  Returning Skilled. Pre-cert started 3/15. Return PASRR completed.   Discharge Plan       Row Name 03/15/25 1239       Plan    Plan From [facility].  Returning Skilled. Pre-cert started 3/15. Return PASRR completed.    Plan Comments CM met with patient and son/POA at bedside to discuss PT recommendations. Family agreeable to patient returning to [facility] for skilled nursing/PT/OT. Return PASRR completed. Pre-cert requested and started 3/15. MD updated on status.                  Minnie Martino RN     Office: [phone]  Fax: [phone]

## 2025-03-15 NOTE — PROGRESS NOTES
[facility] MEDICINE SERVICE  DAILY PROGRESS NOTE    NAME: Lynda Patterson  : 1940  MRN: [id number]      LOS: 4 days     PROVIDER OF SERVICE: Valentin Burrell MD    Chief Complaint: CVA (cerebral vascular accident)    Subjective:   Denies any new complaints. Feels better this am.       Review of Systems:   All 21 ROS were negative except mentioned above.    Objective:     Vital Signs  Temp:  [97.5 °F (36.4 °C)-98.2 °F (36.8 °C)] 97.8 °F (36.6 °C)  Heart Rate:  [70-81] 81  Resp:  [14-22] 22  BP: (101-152)/(58-73) 117/69   Body mass index is 30.01 kg/m².    Physical Exam   General: Alert and oriented to self and place, no acute distress. Mentation at baseline as per family   Lungs: Clear to auscultation, nonlabored respiration.  Heart: RRR  Abdomen: Soft, nontender, nondistended, + bowel sounds.  Neuro: alert and awake, moving all 4 extremities        Scheduled Meds   aspirin, 325 mg, Oral, Daily   Or  aspirin, 300 mg, Rectal, Daily  atorvastatin, 80 mg, Oral, Nightly  [Held by provider] buPROPion XL, 150 mg, Oral, Daily  [Held by provider] busPIRone, 10 mg, Oral, TID  insulin lispro, 2-7 Units, Subcutaneous, 4x Daily With Meals & Nightly  mupirocin, 1 Application, Each Nare, BID  senna-docusate sodium, 2 tablet, Oral, BID  [Held by provider] sertraline, 25 mg, Oral, Daily  sodium chloride, 10 mL, Intravenous, Q12H  sodium chloride, 10 mL, Intravenous, Q12H       PRN Meds     aluminum-magnesium hydroxide-simethicone    senna-docusate sodium **AND** polyethylene glycol **AND** bisacodyl **AND** bisacodyl    Calcium Replacement - Follow Nurse / BPA Driven Protocol    dextrose    dextrose    glucagon (human recombinant)    hydrALAZINE    Magnesium Low Dose Replacement - Follow Nurse / BPA Driven Protocol    nitroglycerin    Phosphorus Replacement - Follow Nurse / BPA Driven Protocol    Potassium Replacement - Follow Nurse / BPA Driven Protocol    prochlorperazine    sodium chloride    sodium  chloride    sodium chloride    sodium chloride    sodium chloride    [Held by provider] traMADol   Infusions         Diagnostic Data    Results from last 7 days   Lab Units 03/14/25  2203   WBC 10*3/mm3 7.93   HEMOGLOBIN g/dL 13.1   HEMATOCRIT % 41.2   PLATELETS 10*3/mm3 174   GLUCOSE mg/dL 286*   CREATININE mg/dL 1.38*   BUN mg/dL 29*   SODIUM mmol/L 146*   POTASSIUM mmol/L 3.8   AST (SGOT) U/L 34*   ALT (SGPT) U/L 15   ALK PHOS U/L 57   BILIRUBIN mg/dL 0.5   ANION GAP mmol/L 11.3       No radiology results for the last day      Interval results reviewed.    Assessment/Plan:   Acute ischemic stroke status post tenecteplase on 3/11/2025  HF reduced ejection fraction.  Diabetes  SONYA on CKD 3  GERD  Elevated anion gap    Neurology following the patient.  Recommendations noted.  Continue aspirin statins  2D echo on 3/12/2025 showed ejection fraction of 36 to 40% with hypokinesis  Loop recorder in Place  Cardiology recommendations appreciated- family declined agressive measures, unable to optimize GDMT because of borderline BP  Monitor renal function and avoid nephrotoxic medications and NSAIDs.  Treatment plan discussed with RN.     VTE Prophylaxis:  Mechanical VTE prophylaxis orders are present.         Code status is   Code Status and Medical Interventions: No CPR (Do Not Attempt to Resuscitate); Limited Support; No intubation (DNI)   Ordered at: 03/11/25 2230     Code Status (Patient has no pulse and is not breathing):    No CPR (Do Not Attempt to Resuscitate)     Medical Interventions (Patient has pulse or is breathing):    Limited Support     Medical Intervention Limits:    No intubation (DNI)     Level Of Support Discussed With:    Health Care Surrogate       Plan for disposition:     Barriers to Discharge:safe dispo  Anticipated Date of Discharge: 3/16/2025  Place of Discharge: Skilled nursing facility      Time: 40 minutes     Signature: Electronically signed by Valentin Burrell MD, 03/15/25, 13:29  EDT.  [facility] Hospitalist Team

## 2025-03-15 NOTE — PROGRESS NOTES
Cardiology Progress Note      PATIENT IDENTIFICATION    Name: Lynda Patterson  Age: 85 y.o. Sex: female : 1940  MRN: [id number]    Requesting Provider    Valentin Burrell,*     LOS: 4 days       Reason For Followup:  Stroke      Subjective:    Interval History:  Seen examined.  Chart and labs reviewed.  Loop recorder interrogated and demonstrates no arrhythmia.    Review of Systems:  A complete review of systems was undertaken with the pertinent cardiovascular findings listed in history of present illness and all other systems being negative.     Assessment & Plan    Impressions:  Stroke  Loop recorder in situ with no evidence of arrhythmia currently  Hypertension  Hyperlipidemia  Diabetes  Cardiomyopathy    Recommendations:  No plans for further workup as patient's family has declined aggressive/invasive testing  Medical management for cardiomyopathy and stroke  Will see as needed        Objective:    Medication Review:   Scheduled Meds:aspirin, 325 mg, Oral, Daily   Or  aspirin, 300 mg, Rectal, Daily  atorvastatin, 80 mg, Oral, Nightly  [Held by provider] buPROPion XL, 150 mg, Oral, Daily  [Held by provider] busPIRone, 10 mg, Oral, TID  insulin lispro, 2-7 Units, Subcutaneous, 4x Daily With Meals & Nightly  mupirocin, 1 Application, Each Nare, BID  senna-docusate sodium, 2 tablet, Oral, BID  [Held by provider] sertraline, 25 mg, Oral, Daily  sodium chloride, 10 mL, Intravenous, Q12H  sodium chloride, 10 mL, Intravenous, Q12H      Continuous Infusions:   PRN Meds:.  aluminum-magnesium hydroxide-simethicone    senna-docusate sodium **AND** polyethylene glycol **AND** bisacodyl **AND** bisacodyl    Calcium Replacement - Follow Nurse / BPA Driven Protocol    dextrose    dextrose    glucagon (human recombinant)    hydrALAZINE    Magnesium Low Dose Replacement - Follow Nurse / BPA Driven Protocol    nitroglycerin    Phosphorus Replacement - Follow Nurse / BPA Driven Protocol    Potassium  Replacement - Follow Nurse / BPA Driven Protocol    prochlorperazine    sodium chloride    sodium chloride    sodium chloride    sodium chloride    sodium chloride    [Held by provider] traMADol      CVA (cerebral vascular accident)         Physical Exam:    General: Alert, cooperative, no distress, appears stated age  Head:  Normocephalic, atraumatic, mucous membranes moist  Eyes:  Conjunctivae/corneas clear, EOMs intact     Neck:  Supple,  no bruit  Lungs:  Clear to auscultation bilaterally, no wheezes, rhonchi or rales are noted  Chest wall: No tenderness  Heart::  Regular rate and rhythm, S1 and S2 normal, 1/6 holosystolic murmur.  No rub or gallop  Abdomen: Soft, nontender, nondistended, bowel sounds active  Extremities: No cyanosis, clubbing, or edema  Pulses: 2+ and symmetric all extremities  Skin:  No rashes or lesions  Neuro/psych: A&O x3.  GUIDO    Vital Signs:  Vitals:    03/15/25 0024 03/15/25 0456 03/15/25 0738 03/15/25 1213   BP: 107/58 125/61 101/73 117/69   BP Location: Left arm Left arm Left arm Left arm   Patient Position: Lying Lying Lying Lying   Pulse: 79 79 74 81   Resp: 16 16 14 22   Temp: 98.2 °F (36.8 °C)  97.5 °F (36.4 °C) 97.8 °F (36.6 °C)   TempSrc: Oral  Oral Oral   SpO2:       Weight:       Height:         Wt Readings from Last 1 Encounters:   03/14/25 67.4 kg (148 lb 9.4 oz)       Intake/Output Summary (Last 24 hours) at 3/15/2025 1340  Last data filed at 3/15/2025 1213  Gross per 24 hour   Intake 420 ml   Output --   Net 420 ml         Results Review:     CBC    Results from last 7 days   Lab Units 03/14/25  2203 03/14/25  0419 03/13/25  0328 03/12/25  0459 03/11/25  1522   WBC 10*3/mm3 7.93 10.19 9.83 8.97 12.09*   HEMOGLOBIN g/dL 13.1 13.6 13.1 13.4 14.0   PLATELETS 10*3/mm3 174 168 171 177 221     Cr Clearance Estimated Creatinine Clearance: 25.5 mL/min (A) (by C-G formula based on SCr of 1.38 mg/dL (H)).  Coag   Results from last 7 days   Lab Units 03/11/25  1522   INR  1.16*   APTT  "seconds 29.1     HbA1C   Lab Results   Component Value Date    HGBA1C 6.08 (H) 03/11/2025    HGBA1C 6.10 (H) 03/16/2023    HGBA1C 5.8 (H) 06/23/2022     Blood Glucose   Glucose   Date/Time Value Ref Range Status   03/15/2025 1216 109 (H) 70 - 105 mg/dL Final     Comment:     Serial Number: [id number]:  [id number]   03/15/2025 0741 74 70 - 105 mg/dL Final     Comment:     Serial Number: [id number]:  [id number]   03/14/2025 2132 126 (H) 70 - 105 mg/dL Final     Comment:     Serial Number: [id number]:  [id number]   03/14/2025 2121 59 (L) 70 - 105 mg/dL Final     Comment:     Serial Number: [id number]:  [id number]   03/14/2025 2005 71 70 - 105 mg/dL Final     Comment:     Serial Number: [id number]:  [id number]   03/14/2025 1745 89 70 - 105 mg/dL Final     Comment:     Serial Number: [id number]:  [id number]   03/14/2025 1129 80 70 - 105 mg/dL Final     Comment:     Serial Number: [id number]:  [id number]   03/14/2025 0857 85 70 - 105 mg/dL Final     Comment:     Serial Number: [id number]:  [id number]     Infection     CMP   Results from last 7 days   Lab Units 03/14/25  2203 03/14/25  0419 03/13/25  0328 03/12/25  1618 03/12/25  0459 03/11/25  1522   SODIUM mmol/L 146* 146* 149*  --  148* 144   POTASSIUM mmol/L 3.8 3.9 3.8 4.2 3.5 3.9   CHLORIDE mmol/L 111* 114* 114*  --  115* 108*   CO2 mmol/L 23.7 17.8* 23.0  --  20.2* 19.8*   BUN mg/dL 29* 34* 42*  --  42* 50*   CREATININE mg/dL 1.38* 1.41* 1.64*  --  1.51* 1.78*   GLUCOSE mg/dL 286* 79 92  --  92 230*   ALBUMIN g/dL 3.7 3.5 3.6  --  3.5 3.8   BILIRUBIN mg/dL 0.5 0.5 0.4  --  0.3 0.4   ALK PHOS U/L 57 54 56  --  53 60   AST (SGOT) U/L 34* 33* 30  --  34* 40*   ALT (SGPT) U/L 15 13 17  --  20 27     ABG      UA      GINETTE  No results found for: \"POCMETH\", \"POCAMPHET\", \"POCBARBITUR\", \"POCBENZO\", \"POCCOCAINE\", \"POCOPIATES\", \"POCOXYCODO\", \"POCPHENCYC\", \"POCPROPOXY\", \"POCTHC\", \"POCTRICYC\"  Lysis Labs   Results from last 7 days "   Lab Units 03/14/25  2203 03/14/25  0419 03/13/25  0328 03/12/25  0459 03/11/25  1522   INR   --   --   --   --  1.16*   APTT seconds  --   --   --   --  29.1   HEMOGLOBIN g/dL 13.1 13.6 13.1 13.4 14.0   PLATELETS 10*3/mm3 174 168 171 177 221   CREATININE mg/dL 1.38* 1.41* 1.64* 1.51* 1.78*     Radiology(recent) No radiology results for the last day            Imaging Results (Last 24 Hours)       ** No results found for the last 24 hours. **            Cardiac Studies:  Echo- Results for orders placed during the hospital encounter of 03/11/25    Adult Transthoracic Echo Complete W/ Cont if Necessary Per Protocol    Interpretation Summary    Left ventricular systolic function is moderately decreased. Left ventricular ejection fraction appears to be 36 - 40%.    The following left ventricular wall segments are hypokinetic: mid anterior, mid anterolateral, mid inferolateral, mid inferior, mid inferoseptal and mid anteroseptal. The following left ventricular wall segments are akinetic: apical anterior, apical lateral, apical inferior, apical septal and apex.    Left ventricular diastolic function is consistent with (grade Ia w/high LAP) impaired relaxation.    Estimated right ventricular systolic pressure from tricuspid regurgitation is normal (<35 mmHg).    Stress Myoview-  Cath-        Terrance Hearn DO  03/15/25  13:40 EDT    Copied information has been reviewed and is current as of 03/15/25

## 2025-03-15 NOTE — PLAN OF CARE
Problem: Adult Inpatient Plan of Care  Goal: Plan of Care Review  Outcome: Progressing  Goal: Patient-Specific Goal (Individualized)  Outcome: Progressing  Goal: Absence of Hospital-Acquired Illness or Injury  Outcome: Progressing  Intervention: Identify and Manage Fall Risk  Recent Flowsheet Documentation  Taken 3/15/2025 0400 by Quynh Sommer RN  Safety Promotion/Fall Prevention:   activity supervised   assistive device/personal items within reach   clutter free environment maintained   fall prevention program maintained   lighting adjusted   nonskid shoes/slippers when out of bed   room organization consistent   safety round/check completed  Taken 3/15/2025 0200 by Quynh Sommer RN  Safety Promotion/Fall Prevention:   activity supervised   assistive device/personal items within reach   clutter free environment maintained   fall prevention program maintained   lighting adjusted   nonskid shoes/slippers when out of bed   safety round/check completed   room organization consistent  Taken 3/15/2025 0000 by Quynh Sommer RN  Safety Promotion/Fall Prevention:   activity supervised   assistive device/personal items within reach   clutter free environment maintained   fall prevention program maintained   lighting adjusted   nonskid shoes/slippers when out of bed   room organization consistent   safety round/check completed  Taken 3/14/2025 2200 by Quynh Sommer RN  Safety Promotion/Fall Prevention:   activity supervised   assistive device/personal items within reach   clutter free environment maintained   fall prevention program maintained   lighting adjusted   nonskid shoes/slippers when out of bed   safety round/check completed   room organization consistent  Taken 3/14/2025 2000 by Quynh Sommer RN  Safety Promotion/Fall Prevention:   activity supervised   safety round/check completed   room organization consistent  Intervention: Prevent Skin Injury  Recent Flowsheet Documentation  Taken 3/15/2025  0400 by Quynh Sommer RN  Body Position: weight shifting  Skin Protection: incontinence pads utilized  Taken 3/15/2025 0000 by Quynh Sommer RN  Body Position: weight shifting  Skin Protection: incontinence pads utilized  Taken 3/14/2025 2000 by Quynh Sommer RN  Skin Protection: incontinence pads utilized  Intervention: Prevent and Manage VTE (Venous Thromboembolism) Risk  Recent Flowsheet Documentation  Taken 3/15/2025 0400 by Quynh Sommer RN  VTE Prevention/Management:   SCDs (sequential compression devices) off   patient refused intervention  Taken 3/15/2025 0000 by Quynh Sommer RN  VTE Prevention/Management:   SCDs (sequential compression devices) off   patient refused intervention  Taken 3/14/2025 2000 by Quynh Sommer RN  VTE Prevention/Management:   SCDs (sequential compression devices) off   patient refused intervention  Intervention: Prevent Infection  Recent Flowsheet Documentation  Taken 3/15/2025 0400 by Quynh Sommer RN  Infection Prevention:   single patient room provided   rest/sleep promoted   personal protective equipment utilized   hand hygiene promoted   equipment surfaces disinfected   environmental surveillance performed  Taken 3/15/2025 0200 by Quynh Sommer RN  Infection Prevention:   environmental surveillance performed   equipment surfaces disinfected   hand hygiene promoted   personal protective equipment utilized   rest/sleep promoted   single patient room provided  Taken 3/15/2025 0000 by Quynh Sommer RN  Infection Prevention:   hand hygiene promoted   personal protective equipment utilized   rest/sleep promoted   single patient room provided   equipment surfaces disinfected   environmental surveillance performed  Taken 3/14/2025 2200 by Quynh Sommer RN  Infection Prevention:   environmental surveillance performed   equipment surfaces disinfected   hand hygiene promoted   personal protective equipment utilized   rest/sleep promoted   single patient room  provided  Taken 3/14/2025 2000 by Quynh Sommer RN  Infection Prevention:   personal protective equipment utilized   equipment surfaces disinfected   hand hygiene promoted   environmental surveillance performed   rest/sleep promoted   single patient room provided  Goal: Optimal Comfort and Wellbeing  Outcome: Progressing  Intervention: Monitor Pain and Promote Comfort  Recent Flowsheet Documentation  Taken 3/15/2025 0400 by Quynh Sommer RN  Pain Management Interventions:   no interventions per patient request   quiet environment facilitated   relaxation techniques promoted   prayer utilized   position adjusted   pain medication given   care clustered  Taken 3/15/2025 0000 by Quynh Sommer RN  Pain Management Interventions:   relaxation techniques promoted   quiet environment facilitated   no interventions per patient request   pillow support provided   cold applied   care clustered  Intervention: Provide Person-Centered Care  Recent Flowsheet Documentation  Taken 3/15/2025 0400 by Quynh Sommer RN  Trust Relationship/Rapport:   care explained   choices provided   emotional support provided   empathic listening provided   questions answered   questions encouraged   reassurance provided   thoughts/feelings acknowledged  Taken 3/15/2025 0000 by Quynh Sommer RN  Trust Relationship/Rapport:   care explained   choices provided   emotional support provided   empathic listening provided   questions answered   questions encouraged   reassurance provided   thoughts/feelings acknowledged  Taken 3/14/2025 2000 by Quynh Sommer RN  Trust Relationship/Rapport:   care explained   choices provided   emotional support provided   empathic listening provided   questions answered   questions encouraged   reassurance provided   thoughts/feelings acknowledged  Goal: Readiness for Transition of Care  Outcome: Progressing     Problem: Fall Injury Risk  Goal: Absence of Fall and Fall-Related Injury  Outcome:  Progressing  Intervention: Identify and Manage Contributors  Recent Flowsheet Documentation  Taken 3/15/2025 0400 by Quynh Sommer RN  Medication Review/Management: medications reviewed  Taken 3/15/2025 0200 by Quynh Sommer RN  Medication Review/Management: medications reviewed  Taken 3/15/2025 0000 by Quynh Sommer RN  Medication Review/Management: medications reviewed  Taken 3/14/2025 2200 by Quynh Sommer RN  Medication Review/Management: medications reviewed  Taken 3/14/2025 2000 by Quynh Sommer RN  Medication Review/Management: medications reviewed  Intervention: Promote Injury-Free Environment  Recent Flowsheet Documentation  Taken 3/15/2025 0400 by Quynh Sommer RN  Safety Promotion/Fall Prevention:   activity supervised   assistive device/personal items within reach   clutter free environment maintained   fall prevention program maintained   lighting adjusted   nonskid shoes/slippers when out of bed   room organization consistent   safety round/check completed  Taken 3/15/2025 0200 by Quynh Sommer RN  Safety Promotion/Fall Prevention:   activity supervised   assistive device/personal items within reach   clutter free environment maintained   fall prevention program maintained   lighting adjusted   nonskid shoes/slippers when out of bed   safety round/check completed   room organization consistent  Taken 3/15/2025 0000 by Quynh Sommer RN  Safety Promotion/Fall Prevention:   activity supervised   assistive device/personal items within reach   clutter free environment maintained   fall prevention program maintained   lighting adjusted   nonskid shoes/slippers when out of bed   room organization consistent   safety round/check completed  Taken 3/14/2025 2200 by Quynh Sommer RN  Safety Promotion/Fall Prevention:   activity supervised   assistive device/personal items within reach   clutter free environment maintained   fall prevention program maintained   lighting adjusted    nonskid shoes/slippers when out of bed   safety round/check completed   room organization consistent  Taken 3/14/2025 2000 by Quynh Sommer RN  Safety Promotion/Fall Prevention:   activity supervised   safety round/check completed   room organization consistent     Problem: Stroke, Ischemic (Includes Transient Ischemic Attack)  Goal: Optimal Coping  Outcome: Progressing  Intervention: Support Psychosocial Response to Stroke  Recent Flowsheet Documentation  Taken 3/15/2025 0400 by Quynh Sommer RN  Supportive Measures: active listening utilized  Family/Support System Care:   support provided   self-care encouraged   involvement promoted  Taken 3/15/2025 0000 by Quynh Sommer RN  Family/Support System Care: support provided  Taken 3/14/2025 2000 by Quynh Sommer RN  Supportive Measures: active listening utilized  Family/Support System Care:   support provided   presence promoted   involvement promoted  Goal: Effective Bowel Elimination  Outcome: Progressing  Goal: Optimal Cerebral Tissue Perfusion  Outcome: Progressing  Intervention: Protect and Optimize Cerebral Perfusion  Recent Flowsheet Documentation  Taken 3/15/2025 0400 by Quynh Sommer RN  Sensory Stimulation Regulation: care clustered  Cerebral Perfusion Promotion: blood pressure monitored  Taken 3/15/2025 0000 by Quynh Sommer RN  Sensory Stimulation Regulation: care clustered  Cerebral Perfusion Promotion: blood pressure monitored  Taken 3/14/2025 2000 by Quynh Sommer RN  Sensory Stimulation Regulation: television on  Cerebral Perfusion Promotion: blood pressure monitored  Goal: Optimal Cognitive Function  Outcome: Progressing  Intervention: Optimize Cognitive Function  Recent Flowsheet Documentation  Taken 3/15/2025 0400 by Quynh Sommer RN  Sensory Stimulation Regulation: care clustered  Reorientation Measures: clock in view  Taken 3/15/2025 0000 by Quynh Sommer RN  Sensory Stimulation Regulation: care clustered  Reorientation  Measures: clock in view  Taken 3/14/2025 2000 by Quynh Sommer RN  Sensory Stimulation Regulation: television on  Reorientation Measures: clock in view  Environment Familiarity/Consistency: personal clothing/items utilized  Goal: Improved Communication Skills  Outcome: Progressing  Intervention: Optimize Communication Skills  Recent Flowsheet Documentation  Taken 3/15/2025 0400 by Quynh Sommer RN  Communication Enhancement Strategies: extra time allowed for response  Taken 3/15/2025 0000 by Quynh Sommer RN  Communication Enhancement Strategies: extra time allowed for response  Taken 3/14/2025 2000 by Quynh Sommer RN  Communication Enhancement Strategies: extra time allowed for response  Goal: Optimal Functional Ability  Outcome: Progressing  Intervention: Optimize Functional Ability  Recent Flowsheet Documentation  Taken 3/15/2025 0400 by Quynh Sommer RN  Activity Management: bedrest  Taken 3/15/2025 0000 by Quynh Sommer RN  Activity Management: bedrest  Taken 3/14/2025 2000 by Quynh Sommer RN  Activity Management: bedrest  Goal: Optimal Nutrition Intake  Outcome: Progressing  Goal: Effective Oxygenation and Ventilation  Outcome: Progressing  Intervention: Optimize Oxygenation and Ventilation  Recent Flowsheet Documentation  Taken 3/15/2025 0400 by Quynh Sommer RN  Head of Bed (HOB) Positioning: HOB elevated  Taken 3/15/2025 0000 by Quynh Sommer RN  Head of Bed (HOB) Positioning: HOB elevated  Goal: Improved Sensorimotor Function  Outcome: Progressing  Intervention: Optimize Range of Motion, Motor Control and Function  Recent Flowsheet Documentation  Taken 3/15/2025 0400 by Quynh Sommer RN  Positioning/Transfer Devices:   pillows   in use  Taken 3/15/2025 0000 by Quynh Sommer RN  Positioning/Transfer Devices:   pillows   in use  Range of Motion: ROM (range of motion) performed  Taken 3/14/2025 2000 by Quynh Sommer RN  Range of Motion: ROM (range of motion)  performed  Intervention: Optimize Sensory and Perceptual Ability  Recent Flowsheet Documentation  Taken 3/15/2025 0400 by Quynh Sommer RN  Pressure Reduction Techniques: weight shift assistance provided  Pressure Reduction Devices: pressure-redistributing mattress utilized  Taken 3/15/2025 0000 by Quynh Sommer RN  Pressure Reduction Techniques: weight shift assistance provided  Pressure Reduction Devices: pressure-redistributing mattress utilized  Taken 3/14/2025 2000 by Quynh Sommer RN  Pressure Reduction Techniques: weight shift assistance provided  Pressure Reduction Devices: pressure-redistributing mattress utilized  Goal: Safe and Effective Swallow  Outcome: Progressing  Goal: Effective Urinary Elimination  Outcome: Progressing     Problem: Skin Injury Risk Increased  Goal: Skin Health and Integrity  Outcome: Progressing  Intervention: Optimize Skin Protection  Recent Flowsheet Documentation  Taken 3/15/2025 0400 by Quynh Sommer RN  Activity Management: bedrest  Pressure Reduction Techniques: weight shift assistance provided  Head of Bed (HOB) Positioning: HOB elevated  Pressure Reduction Devices: pressure-redistributing mattress utilized  Skin Protection: incontinence pads utilized  Taken 3/15/2025 0000 by Quynh Sommer RN  Activity Management: bedrest  Pressure Reduction Techniques: weight shift assistance provided  Head of Bed (HOB) Positioning: HOB elevated  Pressure Reduction Devices: pressure-redistributing mattress utilized  Skin Protection: incontinence pads utilized  Taken 3/14/2025 2000 by Quynh Sommer RN  Activity Management: bedrest  Pressure Reduction Techniques: weight shift assistance provided  Pressure Reduction Devices: pressure-redistributing mattress utilized  Skin Protection: incontinence pads utilized     Problem: Comorbidity Management  Goal: Blood Glucose Level Within Target Range  Outcome: Progressing  Intervention: Monitor and Manage Glycemia  Recent Flowsheet  Documentation  Taken 3/15/2025 0400 by Quynh Sommer RN  Medication Review/Management: medications reviewed  Taken 3/15/2025 0200 by Quynh Sommer RN  Medication Review/Management: medications reviewed  Taken 3/15/2025 0000 by Quynh Sommer RN  Medication Review/Management: medications reviewed  Taken 3/14/2025 2200 by Quynh Sommer RN  Medication Review/Management: medications reviewed  Taken 3/14/2025 2000 by Quynh Sommer RN  Medication Review/Management: medications reviewed     Problem: Violence Risk or Actual  Goal: Anger and Impulse Control  Outcome: Progressing  Intervention: Minimize Safety Risk  Recent Flowsheet Documentation  Taken 3/15/2025 0400 by Quynh Sommer RN  Sensory Stimulation Regulation: care clustered  Enhanced Safety Measures: bed alarm set  Taken 3/15/2025 0200 by Quynh Sommer RN  Enhanced Safety Measures: bed alarm set  Taken 3/15/2025 0000 by Quynh Sommer RN  Sensory Stimulation Regulation: care clustered  Enhanced Safety Measures: bed alarm set  Taken 3/14/2025 2200 by Quynh Sommer RN  Enhanced Safety Measures: bed alarm set  Taken 3/14/2025 2000 by Quynh Sommer RN  Sensory Stimulation Regulation: television on  Enhanced Safety Measures: bed alarm set  Intervention: Promote Self-Control  Recent Flowsheet Documentation  Taken 3/15/2025 0400 by Quynh Sommer RN  Supportive Measures: active listening utilized  Environmental Support:   calm environment promoted   environmental consistency promoted   personal routine supported   rest periods encouraged   distractions minimized   comfort object encouraged   caregiver consistency promoted  Taken 3/14/2025 2000 by Quynh Sommer RN  Supportive Measures: active listening utilized  Environmental Support:   calm environment promoted   caregiver consistency promoted   rest periods encouraged   personal routine supported   environmental consistency promoted   comfort object encouraged   distractions minimized    Goal Outcome Evaluation:

## 2025-03-15 NOTE — DISCHARGE PLACEMENT REQUEST
"Lynda Haji E \"Jocelyne\" (85 y.o. Female)       Date of Birth   1940    Social Security Number       Address   [address] [place] [address] [place] [address]    Home Phone   [phone]    MRN   [id number]       Catholic   Rastafarian    Marital Status                               Admission Date   3/11/2025    Admission Type   Emergency    Admitting Provider   Cheryl Major MD    Attending Provider   Valentin Burrell MD    Department, Room/Bed   [facility], 239/1       Discharge Date       Discharge Disposition       Discharge Destination                                 Attending Provider: Valentin Burrell MD    Allergies: Ciprofloxacin, Penicillin G, Sulfa Antibiotics    Isolation: Contact   Infection: Candida Auris (rule out) (03/11/25)   Code Status: No CPR    Ht: 149.9 cm (59\")   Wt: 67.4 kg (148 lb 9.4 oz)    Admission Cmt: None   Principal Problem: CVA (cerebral vascular accident) [I63.9]                   Active Insurance as of 3/11/2025       Primary Coverage       Payor Plan Insurance Group Employer/Plan Group    ANTHEM MEDICARE REPLACEMENT ANTHEM MEDICARE ADVANTAGE PPO [id number]       Payor Plan Address Payor Plan Phone Number Payor Plan Fax Number Effective Dates    [address] [phone]  1/1/2021 - None Entered    [facility] [id number]         Subscriber Name Subscriber Birth Date Member ID       LYNDA HAJI 1940 [id number]                     Emergency Contacts        (Rel.) Home Phone Work Phone Mobile Phone    Allen Haji (Son) [phone] -- --    Andrea Eaglebalwinder (Daughter) -- -- [phone]    DevinalyciaFranci (Relative) -- -- [phone]                 History & Physical        Early, SHABANA Padilla at 03/11/25 2101       Attestation signed by Cheryl Major MD at 03/12/25 0056    I have reviewed this documentation and agree.                    Critical Care History and Physical     Lynda E " Leonardo : 1940 MRN:[id number] LOS:0 ROOM: 2306/1     Reason for admission: CVA (cerebral vascular accident)     Assessment / Plan     Acute ischemic stroke, M2 occlusion  Code stroke protocol initiated in ED, status post tenecteplase at 1539  Ischemic Stroke orders initiated.  CT scan reviewed: No intracranial hemorrhage. Generalized cerebral atrophy with moderate white matter findings of chronic microvascular disease.  CTA reviewed: Occlusion of the the anterior M2 branch of the left middle cerebral artery   CT perfusion study: true area of Tmax greater than 6 seconds involving the left MCA distribution   Repeat CT scan of head without, 24 hours post TNK administration  MRI  of brain and ECHO ordered  LDL: 94  and A1C: 6.08  Start moderate/high intensity statins.  Neurology consulted.  No antiplatelet or anticoagulant medication until 24 hours post-TNK administration  Observe for signs and symptoms of bleeding, if observed, immediately notify Neurology  NPO pending speech therapy evaluation, patient has a right facial droop   PT/ST/OT evaluation & treatment    CKD stage 3  Remains nonoliguric. Baseline creatinine 1.6  C/w IV fluids as ordered.  Monitor Input/Output very closely.   Avoids NSAIDs, nephrotoxic medications, and hypotension.    Diabetes mellitus Type 2, not insulin-dependent : well controlled.   Not on home insulin or oral agents  A1C 6.08  Accu checks every 6 hours with sliding scale coverage as needed.     Hyperlipidemia: lipid panel WNL, continue atorvastatin  Anxiety: continue buspar, bupropion, and sertraline when able to take PO      Code Status (Patient has no pulse and is not breathing): No CPR (Do Not Attempt to Resuscitate)  Medical Interventions (Patient has pulse or is breathing): Limited Support  Medical Intervention Limits: No intubation (DNI)  Level Of Support Discussed With: Health Care Surrogate       Nutrition:   NPO Diet NPO Type: Strict NPO     VTE Prophylaxis:  Mechanical  VTE prophylaxis orders are present.         History of Present illness     Lynda Patterson is a 85 y.o. female with PMH of type 2 diabetes, CKD stage III, anxiety, GERD presented from an extended care facility to the hospital for sudden onset weakness and inability to speak around 12:30 PM. She has expressive aphasia and hemiparesis on the right side. Code stroke was called and consent was given for TNK which was given at 1539.  Dr. Wolff with neurology spoke with family and they denied wanting any surgical intervention but would like supportive care.  CT perfusion showed a significant delay in the left MCA distribution and CTA confirmed an M2 occlusion.  She will be closely monitored, repeat head CT 24 hours post TNK administration, and was admitted to the ICU with a principal diagnosis of CVA (cerebral vascular accident).  Labs remarkable for CO2 19.8, anion gap 16.2, creatinine 1.78, glucose 230, AST 40, WBC 12.09.      ACP: ACP documentation on file. Patient's son and daughter have dual power of . DNR/DNI.    Patient was seen and examined on 03/11/25 at 22:38 EDT .      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    Macular degeneration     Nondisplaced fracture of proximal end of left fibula 09/03/2021    Sprain of left ankle, initial encounter 08/20/2021    Syncope     Urinary tract infection       Past Surgical History:   Procedure Laterality Date    CHOLECYSTECTOMY  1988    TOTAL ABDOMINAL HYSTERECTOMY  1970      Social History     Socioeconomic History    Marital status:    Tobacco Use    Smoking status: Never    Smokeless tobacco: Never   Vaping Use    Vaping status: Never Used   Substance and Sexual Activity    Alcohol use: No     Drug use: No    Sexual activity: Defer      Family History   Problem Relation Age of Onset    Stroke Mother     Stroke Father     Heart disease Brother     Heart attack Son     Heart disease Son       Allergies   Allergen Reactions    Ciprofloxacin Nausea And Vomiting    Penicillin G Nausea And Vomiting    Sulfa Antibiotics Nausea And Vomiting      Social Drivers of Health     Tobacco Use: Unknown (5/9/2024)    Received from [facility]    Patient History     Smoking Tobacco Use: Never Assessed     Smokeless Tobacco Use: Never     Passive Exposure: Not on file   Alcohol Use: Not At Risk (6/18/2019)    AUDIT-C     Frequency of Alcohol Consumption: Never     Average Number of Drinks: Not on file     Frequency of Binge Drinking: Not on file   Financial Resource Strain: Not on file   Food Insecurity: Not on file   Transportation Needs: Not on file   Physical Activity: Not on file   Stress: Not on file   Social Connections: Not on file   Interpersonal Safety: Not on file   Depression: Not at risk (6/2/2023)    PHQ-2     PHQ-2 Score: 1   Housing Stability: Not on file   Utilities: Not on file   Health Literacy: Not on file   Employment: Not on file   Disabilities: Not on file        Home Medications     Prior to Admission medications    Medication Sig Start Date End Date Taking? Authorizing Provider   atorvastatin (LIPITOR) 10 MG tablet Take 1 tablet by mouth Daily.   Yes Yahir Hsieh MD   buPROPion XL (WELLBUTRIN XL) 150 MG 24 hr tablet Take 1 tablet by mouth Daily.   Yes Yahir Hsieh MD   busPIRone (BUSPAR) 10 MG tablet Take 1 tablet by mouth 3 (Three) Times a Day.   Yes Yahir Hsieh MD   cephalexin (KEFLEX) 250 MG capsule Take 1 capsule by mouth Daily.   Yes Yahir Hsieh MD   ferrous sulfate 325 (65 FE) MG tablet Take 1 tablet by mouth Daily.   Yes Yahir Hsieh MD   Hydrocortisone (Hermelinda's magic butt cream) Apply 1 Application topically to the appropriate area as  directed 4 (Four) Times a Day As Needed.   Yes Yahir Hsieh MD   sertraline (ZOLOFT) 25 MG tablet Take 1 tablet by mouth Daily.   Yes Yahir Hsieh MD   acetaminophen (TYLENOL) 325 MG tablet Take 2 tablets by mouth Every 6 (Six) Hours As Needed for Mild Pain.    Yhair Hsieh MD   bismuth subsalicylate (PEPTO BISMOL) 262 MG/15ML suspension Take 15 mL by mouth Daily As Needed for Indigestion.    Yahir Hsieh MD   camphor-menthol (SARNA) 0.5-0.5 % lotion Apply 1 Application topically to the appropriate area as directed 2 (Two) Times a Day As Needed for Itching.    Yahir Hsieh MD   guaifenesin (ROBITUSSIN) 100 MG/5ML liquid Take 10 mL by mouth Every 6 (Six) Hours As Needed for Cough.    Yahir Hsieh MD   loperamide (IMODIUM) 2 MG capsule Take 1 capsule by mouth Every 6 (Six) Hours As Needed for Diarrhea.    Yahir Hsieh MD   midodrine (PROAMATINE) 5 MG tablet Take 1 tablet by mouth 2 (Two) Times a Day As Needed.    Yahir Hsieh MD   traMADol (ULTRAM) 50 MG tablet Take 1 tablet by mouth 2 (Two) Times a Day As Needed for Moderate Pain.    Yahir Hsieh MD   vitamin D (ERGOCALCIFEROL) 1.25 MG (34624 UT) capsule capsule Take 1 capsule by mouth Every 30 (Thirty) Days. First Wednesday of month    Yahir Hsieh MD   atorvastatin (LIPITOR) 10 MG tablet  9/3/23 3/11/25  Yahir Hsieh MD   cephalexin (KEFLEX) 250 MG capsule Take 1 capsule (250 mg total) by mouth 1 (one) time each day 3/16/23 3/11/25  Yahir Hsieh MD   estradiol (ESTRACE) 0.1 MG/GM vaginal cream Insert 2 g into the vagina 3 (Three) Times a Week. [facility]  3/11/25  Yahir Hsieh MD   fluticasone (FLONASE) 50 MCG/ACT nasal spray 2 sprays into the nostril(s) as directed by provider Daily for 30 days. 1 spray in each nostril daily 8/17/21 3/11/25  Hiral Up PA-C   gabapentin (NEURONTIN) 300 MG capsule TAKE ONE CAPSULE BY MOUTH EVERY NIGHT AT BEDTIME  5/14/23 3/11/25  Cabrera Tirado MD   mometasone (ELOCON) 0.1 % cream APPLY TO AFFECTED AREA(S) DAILY 7/10/23 3/11/25  Jenn Tirado MD   ONE TOUCH LANCETS misc ONETOUCH LANCETS 6/6/12 3/11/25  ProviderYahir MD   OneTouch Ultra test strip TEST BLOOD SUGAR ONCE DAILY 9/13/21 3/11/25  Jenn Tirado MD   sertraline (ZOLOFT) 100 MG tablet Take 1.5 tablets by mouth Every Night. 3/16/23 3/11/25  Jenn Tirado MD        Objective / Physical Exam     Vital signs:  Temp: 97.2 °F (36.2 °C)  BP: 123/62  Heart Rate: 64  Resp: 13  SpO2: 97 %  Weight: 66.5 kg (146 lb 9.7 oz)    Admission Weight: Weight: 66.5 kg (146 lb 9.7 oz)    Physical Exam  Vitals and nursing note reviewed.   Constitutional:       General: She is not in acute distress.     Appearance: She is toxic-appearing.   HENT:      Head: Normocephalic and atraumatic.      Nose: Nose normal.      Mouth/Throat:      Mouth: Mucous membranes are moist.   Eyes:      Conjunctiva/sclera: Conjunctivae normal.      Comments: Pupils equal   Cardiovascular:      Rate and Rhythm: Normal rate and regular rhythm.      Heart sounds: Normal heart sounds. No murmur heard.  Pulmonary:      Effort: Pulmonary effort is normal.      Comments: Bilateral breath sounds clear, diminished  Abdominal:      Palpations: Abdomen is soft.   Musculoskeletal:         General: No swelling.   Skin:     General: Skin is warm and dry.   Neurological:      Mental Status: She is alert.      Comments: Right facial droop  Patient's answers are inconsistent and aphasic, it seems that sensation is intact on bilateral upper extremities and sensation absent on bilateral lower extremities.   Unable to follow commands with right arm, left arm  strength intact, wiggles bilateral feet to command  Aphasic          Labs     Results from last 7 days   Lab Units 03/11/25  1522   WBC 10*3/mm3 12.09*   HEMOGLOBIN g/dL 14.0   HEMATOCRIT % 44.8   PLATELETS 10*3/mm3  221      Results from last 7 days   Lab Units 25  1522   SODIUM mmol/L 144   POTASSIUM mmol/L 3.9   CHLORIDE mmol/L 108*   CO2 mmol/L 19.8*   ANION GAP mmol/L 16.2*   BUN mg/dL 50*   CREATININE mg/dL 1.78*   GLUCOSE mg/dL 230*   ALT (SGPT) U/L 27   AST (SGOT) U/L 40*   ALK PHOS U/L 60            Imaging     Chest X ray: My independent assessment showed mild pulmonary vascular congestion, no infiltrates or effusions    EKG: My independent evaluation showed sinus rhythm, S-T depression in V leads, HR 84, qtc 543    Current Medications     Scheduled Meds:  [START ON 3/12/2025] aspirin, 325 mg, Oral, Daily   Or  [START ON 3/12/2025] aspirin, 300 mg, Rectal, Daily  atorvastatin, 80 mg, Oral, Nightly  insulin lispro, 2-7 Units, Subcutaneous, Q6H  mupirocin, 1 Application, Each Nare, BID  senna-docusate sodium, 2 tablet, Oral, BID  sodium chloride, 10 mL, Intravenous, Q12H  sodium chloride, 10 mL, Intravenous, Q12H         Continuous Infusions:        Plan discussed with RN. Reviewed all other data in the last 24 hours, including but not limited to vitals, labs, microbiology, imaging and pertinent notes from other providers.  Plan also discussed with the patient and family at the bedside.      SHABANA Angelo   Critical Care  25   22:38 EDT       Electronically signed by Cheryl Major MD at 25 0059       Operative/Procedure Notes (all)    No notes of this type exist for this encounter.          Physician Progress Notes (last 48 hours)        Valentin Burrell MD at 25 1414              [facility] MEDICINE SERVICE  DAILY PROGRESS NOTE    NAME: Lynda Patterson  : 1940  MRN: [id number]      LOS: 3 days     PROVIDER OF SERVICE: Valentin Burrell MD    Chief Complaint: CVA (cerebral vascular accident)    Subjective:   Patient alert and oriented to self. She is at her baseline as per discussion with her son and daughter in law at bed side.        Review of Systems:    All 21 ROS were negative except mentioned above.    Objective:     Vital Signs  Temp:  [96.7 °F (35.9 °C)-98 °F (36.7 °C)] 96.7 °F (35.9 °C)  Heart Rate:  [64-78] 64  Resp:  [11-18] 16  BP: (114-154)/(63-75) 146/64   Body mass index is 30.01 kg/m².    Physical Exam   General: Alert and oriented to self, no acute distress. Mentation at baseline as per family   Lungs: Clear to auscultation, nonlabored respiration.  Heart: RRR  Abdomen: Soft, nontender, nondistended, + bowel sounds.  Neuro: alert and awake, moving all 4 extremities        Scheduled Meds   aspirin, 325 mg, Oral, Daily   Or  aspirin, 300 mg, Rectal, Daily  atorvastatin, 80 mg, Oral, Nightly  [Held by provider] buPROPion XL, 150 mg, Oral, Daily  [Held by provider] busPIRone, 10 mg, Oral, TID  insulin lispro, 2-7 Units, Subcutaneous, 4x Daily With Meals & Nightly  mupirocin, 1 Application, Each Nare, BID  senna-docusate sodium, 2 tablet, Oral, BID  [Held by provider] sertraline, 25 mg, Oral, Daily  sodium chloride, 10 mL, Intravenous, Q12H  sodium chloride, 10 mL, Intravenous, Q12H       PRN Meds     aluminum-magnesium hydroxide-simethicone    senna-docusate sodium **AND** polyethylene glycol **AND** bisacodyl **AND** bisacodyl    Calcium Replacement - Follow Nurse / BPA Driven Protocol    dextrose    dextrose    glucagon (human recombinant)    hydrALAZINE    Magnesium Low Dose Replacement - Follow Nurse / BPA Driven Protocol    nitroglycerin    Phosphorus Replacement - Follow Nurse / BPA Driven Protocol    Potassium Replacement - Follow Nurse / BPA Driven Protocol    prochlorperazine    sodium chloride    sodium chloride    sodium chloride    sodium chloride    sodium chloride    [Held by provider] traMADol   Infusions         Diagnostic Data    Results from last 7 days   Lab Units 03/14/25  0419   WBC 10*3/mm3 10.19   HEMOGLOBIN g/dL 13.6   HEMATOCRIT % 44.2   PLATELETS 10*3/mm3 168   GLUCOSE mg/dL 79   CREATININE mg/dL 1.41*   BUN mg/dL 34*   SODIUM  mmol/L 146*   POTASSIUM mmol/L 3.9   AST (SGOT) U/L 33*   ALT (SGPT) U/L 13   ALK PHOS U/L 54   BILIRUBIN mg/dL 0.5   ANION GAP mmol/L 14.2       CT Head Without Contrast  Result Date: 3/12/2025  Impression: No acute intracranial findings. Electronically Signed: Liang NDIAYE Luchocheko  3/12/2025 5:31 PM EDT  Workstation ID: [id number]      Interval results reviewed.    Assessment/Plan:   Acute ischemic stroke status post tenecteplase on 3/11/2025  HF reduced ejection fraction.  Diabetes  SONYA on CKD 3  GERD  Elevated anion gap    Neurology following the patient.  Recommendations noted.  Continue aspirin statins  2D echo on 3/12/2025 showed ejection fraction of 36 to 40% with hypokinesis  Loop recorder in Place  Will consult cardiology for new ECHO changes   Monitor renal function and avoid nephrotoxic medications and NSAIDs.  Treatment plan discussed with RN.     VTE Prophylaxis:  Mechanical VTE prophylaxis orders are present.         Code status is   Code Status and Medical Interventions: No CPR (Do Not Attempt to Resuscitate); Limited Support; No intubation (DNI)   Ordered at: 25 2230     Code Status (Patient has no pulse and is not breathing):    No CPR (Do Not Attempt to Resuscitate)     Medical Interventions (Patient has pulse or is breathing):    Limited Support     Medical Intervention Limits:    No intubation (DNI)     Level Of Support Discussed With:    Health Care Surrogate       Plan for disposition:     Barriers to Discharge: Stroke workup, cardio eval  Anticipated Date of Discharge: 3/15/2025  Place of Discharge: Skilled nursing facility      Time: 40 minutes     Signature: Electronically signed by Valentin Burrell MD, 25, 14:14 EDT.  [facility] [place] Hospitalist Team     Electronically signed by Valentin Burrell MD at 25 1418       Shanta Bowser MD at 25 1227              [facility] MEDICINE SERVICE  DAILY PROGRESS NOTE    NAME: Lynda Patterson  : 1940  MRN:  [id number]      LOS: 2 days     PROVIDER OF SERVICE: Shanta Bowser MD    Chief Complaint: CVA (cerebral vascular accident)    Subjective:   Patient alert and oriented has had a bowel movement yesterday.  Told me that she lives with her .  Denies any chest pain nausea vomiting diarrhea  Interval History:    Patient seen and evaluated at bedside.   H&P, consults, labs and imaging reviewed  Treatment plan discussed with patient. All questions addressed.     Review of Systems:   All 21 ROS were negative except mentioned above.    Objective:     Vital Signs  Temp:  [96.8 °F (36 °C)-98.4 °F (36.9 °C)] 96.8 °F (36 °C)  Heart Rate:  [63-82] 76  Resp:  [13-17] 16  BP: ()/(32-92) 111/84   Body mass index is 30.37 kg/m².    Physical Exam   General: No acute distress, appears stated age  Neuro: Awake and alert, oriented , focal deficits appreciated  Head: Atraumatic, normocephalic  HEENT: EOMI, anicteric, normal sclerae and conjunctivae, moist mucus membranes  Neck: supple, no lymphadenopathy  CV: RRR, soft heart sounds, no murmurs appreciated, no peripheral edema  Pulm: Decreased breath sounds, no increased work of breathing, no adventitious sounds  Abd: Soft, nontender, nondistended  Skin: Warm, dry and intact  Psych: Appropriate mood and affect    Scheduled Meds   aspirin, 325 mg, Oral, Daily   Or  aspirin, 300 mg, Rectal, Daily  atorvastatin, 80 mg, Oral, Nightly  [Held by provider] buPROPion XL, 150 mg, Oral, Daily  [Held by provider] busPIRone, 10 mg, Oral, TID  insulin lispro, 2-7 Units, Subcutaneous, Q6H  mupirocin, 1 Application, Each Nare, BID  senna-docusate sodium, 2 tablet, Oral, BID  [Held by provider] sertraline, 25 mg, Oral, Daily  sodium chloride, 10 mL, Intravenous, Q12H  sodium chloride, 10 mL, Intravenous, Q12H       PRN Meds     aluminum-magnesium hydroxide-simethicone    senna-docusate sodium **AND** polyethylene glycol **AND** bisacodyl **AND** bisacodyl    Calcium Replacement - Follow Nurse /  BPA Driven Protocol    dextrose    dextrose    glucagon (human recombinant)    hydrALAZINE    Magnesium Low Dose Replacement - Follow Nurse / BPA Driven Protocol    nitroglycerin    Phosphorus Replacement - Follow Nurse / BPA Driven Protocol    Potassium Replacement - Follow Nurse / BPA Driven Protocol    prochlorperazine    sodium chloride    sodium chloride    sodium chloride    sodium chloride    sodium chloride    [Held by provider] traMADol   Infusions         Diagnostic Data    Results from last 7 days   Lab Units 03/13/25  0328   WBC 10*3/mm3 9.83   HEMOGLOBIN g/dL 13.1   HEMATOCRIT % 41.9   PLATELETS 10*3/mm3 171   GLUCOSE mg/dL 92   CREATININE mg/dL 1.64*   BUN mg/dL 42*   SODIUM mmol/L 149*   POTASSIUM mmol/L 3.8   AST (SGOT) U/L 30   ALT (SGPT) U/L 17   ALK PHOS U/L 56   BILIRUBIN mg/dL 0.4   ANION GAP mmol/L 12.0       CT Head Without Contrast  Result Date: 3/12/2025  Impression: No acute intracranial findings. Electronically Signed: Liang Key  3/12/2025 5:31 PM EDT  Workstation ID: [id number]    MRI Brain Without Contrast  Result Date: 3/12/2025  Impression: Atrophy and chronic microvascular ischemic change. No acute intracranial process. Electronically Signed: Mihir García MD  3/12/2025 12:16 AM EDT  Workstation ID: [id number]    XR Chest 1 View  Result Date: 3/11/2025  Impression: Loop recorder projects over the left lower chest. No radiographic evidence of an acute cardiopulmonary abnormality. Electronically Signed: Liang Key  3/11/2025 4:37 PM EDT  Workstation ID: [id number]    CT Angiogram Head w AI Analysis of LVO  Result Date: 3/11/2025  Impression: 1. No evidence of hemodynamically significant stenosis of the carotid or vertebral arteries. 2. Areas of irregularity with beaded appearance involving the distal bilateral internal carotid arteries and distal V2 segment of the left vertebral artery suggesting changes of fibromuscular dysplasia. 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 4.  Occlusion of the the anterior M2 branch of the left middle cerebral artery. This would correspond to the perfusion abnormality. Electronically Signed: Bhaskar Kimble MD  3/11/2025 4:31 PM EDT  Workstation ID: [id number]    CT Angiogram Neck  Result Date: 3/11/2025  Impression: 1. No evidence of hemodynamically significant stenosis of the carotid or vertebral arteries. 2. Areas of irregularity with beaded appearance involving the distal bilateral internal carotid arteries and distal V2 segment of the left vertebral artery suggesting changes of fibromuscular dysplasia. 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 4. Occlusion of the the anterior M2 branch of the left middle cerebral artery. This would correspond to the perfusion abnormality. Electronically Signed: Bhaskar Kimble MD  3/11/2025 4:31 PM EDT  Workstation ID: [id number]    CT CEREBRAL PERFUSION WITH & WITHOUT CONTRAST  Result Date: 3/11/2025  1.motion-degraded study with true area of Tmax rater than 6 seconds involving the left MCA distribution. Precise volume is not quantifiable due to motion artifact. Within this region there is a 7 mL volume of cerebral blood flow less than 30% compatible with a component of core infarct. Corresponding anterior M2 occlusion is noted on CTA. These findings were discussed with Dr. Cruz in the emergency department at 3:51 p.m. on 3/11/2025 Electronically Signed: Bhaskar Kimble MD  3/11/2025 3:51 PM EDT  Workstation ID: [id number]    CT Head Without Contrast Stroke Protocol  Result Date: 3/11/2025  Impression: 1. No intracranial hemorrhage. 2. Generalized cerebral atrophy with moderate white matter findings of chronic microvascular disease. 3. Left mastoid effusion. Electronically Signed: Hal Cee MD  3/11/2025 3:37 PM EDT  Workstation ID: [id number]      Interval results reviewed.    Assessment/Plan:   Acute ischemic stroke status post tenecteplase on 3/11/2025  HF reduced ejection fraction.  Diabetes  SONYA on CKD  3  GERD  Elevated anion gap    Neurology following the patient.  Recommendations noted.  Continue aspirin statins  2D echo on 3/12/2025 showed ejection fraction of 36 to 40% with hypokinesis  Loop recorder in Place    Continue normal saline.  SONYA most likely due to diabetic nephropathy, hypertensive nephrosclerosis  Monitor renal function and avoid nephrotoxic medications and NSAIDs.    Treatment plan discussed with RN.     VTE Prophylaxis:  Mechanical VTE prophylaxis orders are present.         Code status is   Code Status and Medical Interventions: No CPR (Do Not Attempt to Resuscitate); Limited Support; No intubation (DNI)   Ordered at: 25 2230     Code Status (Patient has no pulse and is not breathing):    No CPR (Do Not Attempt to Resuscitate)     Medical Interventions (Patient has pulse or is breathing):    Limited Support     Medical Intervention Limits:    No intubation (DNI)     Level Of Support Discussed With:    [facility]       Plan for disposition:     Barriers to Discharge: Stroke workup  Anticipated Date of Discharge: 3/15/2025  Place of Discharge: Skilled nursing facility      Time: 40 minutes     Signature: Electronically signed by Shanta Bowser MD, 25, 12:28 EDT.  [facility] Hospitalist Team     Electronically signed by Shanta Bowser MD at 25 1238          Consult Notes (last 48 hours)        Deric Johnson MD at 25 1006        Consult Orders    1. Inpatient Cardiology Consult [[id number]] ordered by Valentin Burrell MD at 25 0853                     Cardiology Consult Note    Patient Identification:  Name: Lynda Patterson  Age: 85 y.o.  Sex: female  :  1940  MRN: [id number]             Requesting Physician :  Valentin Burrell MD     Reason for Consultation / Chief Complaint :   CVA, LV dysfunction    History of Present Illness:           Ms. Lynda Patterson has PMH of     CAD, details of which are not  available--Lexiscan Cardiolite 8/12/2022 negative for ischemia EF of 77%  Recurrent syncope, Biotronik ILR 3/14/2023  Hypertension  Dyslipidemia  Type 2 diabetes  CKD  Cholecystectomy, hysterectomy  Non-smoker  Allergies/intolerance to Cipro, penicillin, sulfa  Positive family history of premature CAD in patient's son    Presented through emergency room 3/11/2025 with new onset expressive aphasia and hemiparesis on right side.  Code stroke was called and patient received TNK tPA.  Has partial resolution of symptoms.  Workup revealed LV dysfunction therefore cardiology is consulted.  Patient is more awake denies any chest pain or shortness of breath.    Data: Labs from 3/11/2025 - 3/14/2025: proBNP is elevated at 13,661.  Creatinine is elevated at 1.71 on admission and today is 1.41.  Hemoglobin A1c of 6.08.  Normal TSH, CBC  CT head 3/12/2025: No acute intracranial abnormality  MRI 3/11/2025 reveals atrophy and chronic microvascular ischemic changes no acute intracranial process.  EKG done 3/11/2025 reviewed/interpreted by me reveals sinus rhythm with rate of 84 bpm  Echocardiogram 3/12/2025 reviewed/interpreted by me reveals LV dysfunction with EF of 36 to 40% with regional wall motion abnormality.  Grade 1 diastolic dysfunction           Assessment:  :     Acute CVA  New onset LV dysfunction, with regional wall motion abnormalities consistent with ischemic cardiomyopathy.  Bradycardia  Dyspnea on exertion  CAD  Hypertension  Dyslipidemia  Diabetes  CKD Dr. Reid  Obesity with BMI over 30  Positive family history of premature CAD     Recommendations / Plan:            Patient underwent Lexiscan Cardiolite reviewed/interpreted by me 8/12/2022 which revealed normal perfusion EF of 77%.  Patient is bradycardic.  Had previous loop recorder, Biotronik loop recorder.  Loop recorder was checked today and is not revealing any arrhythmias.  Discussed with patient's daughter in law and son by bedside.  They do not want any  LEEROY or anything aggressive/invasive..  Continue medical management with aspirin, atorvastatin.  Patient blood pressure is labile.  Has been as low as 92/32 and 1 45/64.  Patient has LV dysfunction will benefit from guideline directed medical therapy with Entresto.  But patient's blood pressure is labile now.  Will follow-up as outpatient and start but blood pressure stable.  Will continue to monitor and restart meds when stable.         Cardiographics  ECG: EKG tracing was  personally reviewed/interpreted by me  ECG 12 Lead Stroke Evaluation   Final Result   HEART RATE=84  bpm   RR Efgqjdts=954  ms   SD Aoqylysl=646  ms   P Horizontal Axis=-15  deg   P Front Axis=39  deg   QRSD Fqpywmuz=285  ms   QT Dmbvgabx=252  ms   FEaZ=743  ms   QRS Axis=137  deg   T Wave Axis=235  deg   - ABNORMAL ECG -   Sinus rhythm   Low voltage, extremity leads   Abnormal T, consider ischemia, diffuse leads   Prolonged QT interval   No previous ECG available for comparison   Electronically Signed By: Vincent Sanchez (LAURY) 2025-03-12 06:10:21   Date and Time of Study:2025-03-11 16:14:14      Telemetry Scan   Final Result      Telemetry Scan   Final Result      Telemetry Scan   Final Result      Telemetry Scan   Final Result      Telemetry Scan   Final Result      Telemetry Scan   Final Result      Telemetry Scan   Final Result          Telemetry: Sinus rhythm      Assessment       Diagnosis Plan   1. 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    Macular degeneration     Mild cognitive impairment of uncertain or unknown  etiology     Muscle weakness (generalized)     Nondisplaced fracture of proximal end of left fibula 09/03/2021    Reduced mobility     Repeated falls     Sprain of left ankle, initial encounter 08/20/2021    Syncope     Urinary tract infection     Vitamin D deficiency      Past Surgical History:  Past Surgical History:   Procedure Laterality Date    CHOLECYSTECTOMY  1988    TOTAL ABDOMINAL HYSTERECTOMY  1970      Allergies:  Allergies   Allergen Reactions    Ciprofloxacin Nausea And Vomiting    Penicillin G Nausea And Vomiting    Sulfa Antibiotics Nausea And Vomiting     Home Meds:  Medications Prior to Admission   Medication Sig Dispense Refill Last Dose/Taking    atorvastatin (LIPITOR) 10 MG tablet Take 1 tablet by mouth Daily.   3/10/2025    buPROPion XL (WELLBUTRIN XL) 150 MG 24 hr tablet Take 1 tablet by mouth Daily.   3/11/2025    busPIRone (BUSPAR) 10 MG tablet Take 1 tablet by mouth 3 (Three) Times a Day.   3/11/2025    cephalexin (KEFLEX) 250 MG capsule Take 1 capsule by mouth Daily.   3/10/2025    ferrous sulfate 325 (65 FE) MG tablet Take 1 tablet by mouth Daily.   3/11/2025    Hydrocortisone (Hermelinda's magic butt cream) Apply 1 Application topically to the appropriate area as directed 4 (Four) Times a Day As Needed.   3/11/2025    sertraline (ZOLOFT) 25 MG tablet Take 1 tablet by mouth Daily.   3/11/2025    acetaminophen (TYLENOL) 325 MG tablet Take 2 tablets by mouth Every 6 (Six) Hours As Needed for Mild Pain.       bismuth subsalicylate (PEPTO BISMOL) 262 MG/15ML suspension Take 15 mL by mouth Daily As Needed for Indigestion.       camphor-menthol (SARNA) 0.5-0.5 % lotion Apply 1 Application topically to the appropriate area as directed 2 (Two) Times a Day As Needed for Itching.       guaifenesin (ROBITUSSIN) 100 MG/5ML liquid Take 10 mL by mouth Every 6 (Six) Hours As Needed for Cough.       loperamide (IMODIUM) 2 MG capsule Take 1 capsule by mouth Every 6 (Six) Hours As Needed for Diarrhea.        midodrine (PROAMATINE) 5 MG tablet Take 1 tablet by mouth 2 (Two) Times a Day As Needed.       traMADol (ULTRAM) 50 MG tablet Take 1 tablet by mouth 2 (Two) Times a Day As Needed for Moderate Pain.       vitamin D (ERGOCALCIFEROL) 1.25 MG (23233 UT) capsule capsule Take 1 capsule by mouth Every 30 (Thirty) Days. First Wednesday of month   3/5/2025     Current Meds:     Current Facility-Administered Medications:     aluminum-magnesium hydroxide-simethicone (MAALOX MAX) 400-400-40 MG/5ML suspension 15 mL, 15 mL, Oral, Q6H PRN, Early, SHABANA Padilla    aspirin tablet 325 mg, 325 mg, Oral, Daily, 325 mg at 03/14/25 0859 **OR** aspirin suppository 300 mg, 300 mg, Rectal, Daily, Early, SHABANA Padilla    atorvastatin (LIPITOR) tablet 80 mg, 80 mg, Oral, Nightly, Early, Gabriella VALDERRAMA APRN, 80 mg at 03/13/25 2102    sennosides-docusate (PERICOLACE) 8.6-50 MG per tablet 2 tablet, 2 tablet, Oral, BID, 2 tablet at 03/13/25 2102 **AND** polyethylene glycol (MIRALAX) packet 17 g, 17 g, Oral, Daily PRN **AND** bisacodyl (DULCOLAX) EC tablet 5 mg, 5 mg, Oral, Daily PRN **AND** bisacodyl (DULCOLAX) suppository 10 mg, 10 mg, Rectal, Daily PRN, Gabriella Roth APRN    [Held by provider] buPROPion XL (WELLBUTRIN XL) 24 hr tablet 150 mg, 150 mg, Oral, Daily, EarlyGabriella APRZUNILDA    [Held by provider] busPIRone (BUSPAR) tablet 10 mg, 10 mg, Oral, TID, Gabriella Roth APRN    Calcium Replacement - Follow Nurse / BPA Driven Protocol, , Not Applicable, PRN, Early, SHABANA Padilla    dextrose (D50W) (25 g/50 mL) IV injection 25 g, 25 g, Intravenous, Q15 Min PRN, Gabriella Roth APRN, 25 g at 03/11/25 2241    dextrose (GLUTOSE) oral gel 15 g, 15 g, Oral, Q15 Min PRN, Early, SHABANA Padilla    glucagon (GLUCAGEN) injection 1 mg, 1 mg, Intramuscular, Q15 Min PRN, Early, SHABANA Padilla    hydrALAZINE (APRESOLINE) injection 10 mg, 10 mg, Intravenous, Q4H PRN, Gabriella Roth APRN    insulin lispro (HUMALOG/ADMELOG) injection 2-7 Units, 2-7 Units,  Subcutaneous, 4x Daily With Meals & Nightly, Shanta Bowser MD    Magnesium Low Dose Replacement - Follow Nurse / BPA Driven Protocol, , Not Applicable, PRN, Early, Gabriella A, APRN    mupirocin (BACTROBAN) 2 % nasal ointment 1 Application, 1 Application, Each Nare, BID, Early, Gabriella A, APRN, 1 Application at 03/14/25 0859    nitroglycerin (NITROSTAT) SL tablet 0.4 mg, 0.4 mg, Sublingual, Q5 Min PRN, Early, Gabriella A, APRN    Phosphorus Replacement - Follow Nurse / BPA Driven Protocol, , Not Applicable, PRN, Early, Gabriella A, APRN    Potassium Replacement - Follow Nurse / BPA Driven Protocol, , Not Applicable, PRN, Early, Gabriella A, APRN    prochlorperazine (COMPAZINE) injection 5 mg, 5 mg, Intravenous, Q6H PRN, Early, Gabriella A, APRN    [Held by provider] sertraline (ZOLOFT) tablet 25 mg, 25 mg, Oral, Daily, Early, Gabriella A, APRN    sodium chloride 0.9 % flush 10 mL, 10 mL, Intravenous, PRN, Vincent Sanchez MD    sodium chloride 0.9 % flush 10 mL, 10 mL, Intravenous, Q12H, Early, Gabriella A, APRN, 10 mL at 03/14/25 0859    sodium chloride 0.9 % flush 10 mL, 10 mL, Intravenous, PRN, Early, Gabriella A, APRN    sodium chloride 0.9 % flush 10 mL, 10 mL, Intravenous, Q12H, Early, Gabriella A, APRN, 10 mL at 03/14/25 0859    sodium chloride 0.9 % flush 10 mL, 10 mL, Intravenous, PRN, Early, Gabriella A, APRN    sodium chloride 0.9 % infusion 40 mL, 40 mL, Intravenous, PRN, Early, Gabriella A, APRN    sodium chloride 0.9 % infusion 40 mL, 40 mL, Intravenous, PRN, Early, Gabriella A, APRN    [Held by provider] traMADol (ULTRAM) tablet 50 mg, 50 mg, Oral, BID PRN, Early, Gabriella A, APRN  Social History:   Social History     Tobacco Use    Smoking status: Never     Passive exposure: Never    Smokeless tobacco: Never   Substance Use Topics    Alcohol use: No      Family History:  Family History   Problem Relation Age of Onset    Stroke Mother     Stroke Father     Heart disease Brother     Heart attack Son     Heart disease Son         Review of Systems : Review  "of Systems   Unable to perform ROS: Mental status change            Constitutional:  Temp:  [96.8 °F (36 °C)-98 °F (36.7 °C)] 97.7 °F (36.5 °C)  Heart Rate:  [68-78] 78  Resp:  [11-18] 14  BP: (111-154)/(63-84) 125/68    Physical Exam   /68 (BP Location: Right arm, Patient Position: Lying)   Pulse 78   Temp 97.7 °F (36.5 °C) (Oral)   Resp 14   Ht 149.9 cm (59\")   Wt 67.4 kg (148 lb 9.4 oz)   SpO2 94%   BMI 30.01 kg/m²   Physical Exam  General:  Appears in no acute distress  Eyes: Sclerae are anicteric,  conjunctivae are clear   HEENT:  No JVD. Thyroid not visibly enlarged. No mucosal pallor or cyanosis  Respiratory: Respirations regular and unlabored at rest.  Clear to auscultation  Cardiovascular: S1,S2 Regular rate and rhythm.  No murmur  Gastrointestinal: Abdomen nondistended.  Musculoskeletal:  No abnormal movements  Extremities: No digital clubbing or cyanosis  Skin: Color pink. Skin warm and dry to touch.   Neuro: Alert and awake.        Echocardiogram:   Results for orders placed during the hospital encounter of 03/11/25    Adult Transthoracic Echo Complete W/ Cont if Necessary Per Protocol    Interpretation Summary    Left ventricular systolic function is moderately decreased. Left ventricular ejection fraction appears to be 36 - 40%.    The following left ventricular wall segments are hypokinetic: mid anterior, mid anterolateral, mid inferolateral, mid inferior, mid inferoseptal and mid anteroseptal. The following left ventricular wall segments are akinetic: apical anterior, apical lateral, apical inferior, apical septal and apex.    Left ventricular diastolic function is consistent with (grade Ia w/high LAP) impaired relaxation.    Estimated right ventricular systolic pressure from tricuspid regurgitation is normal (<35 mmHg).      STRESS TEST  Results for orders placed during the hospital encounter of 08/12/22    Stress Test With Myocardial Perfusion One Day    Interpretation " Summary  LEXISCAN CARDIOLITE REPORT    DATE OF PROCEDURE: 2022    INDICATION FOR PROCEDURE: Recurrent syncope, dyspnea on exertion, bradycardia, hypertension, and diabetes, dyslipidemia, CKD    PROCEDURE PERFORMED: Lexiscan Cardiolite    PROCEDURE COMMENTS:    After informed consent was obtained.  Stress test was supervised by nurse practitioner. Patient's resting heart rate was 53 bpm, resting blood pressure was 134/84, resting EKG revealed sinus bradycardia at the rate of 53 bpm with poor R wave progression.  Patient was given 0.4 mg of regadenosine for stress testing.  There was no significant change in heart rate, blood pressure, symptoms with regadenoson injection.  Patient tolerated procedure well.  Complications were none.    NUCLEAR IMAGIN.  There was   uniform uptake of Cardiolite both in resting and post stress images, no ischemia seen.  2.  Gated images reveal  normal LV size and contractility, LVEF of 77%.    CONCLUSION:  1.  Lexiscan Cardiolite with normal perfusion, negative for ischemia.  2.  Normal wall motion.  LVEF of 77%.    RECOMMENDATIONS:    Clinical correlation recommended.      Deric Johnson MD  22  19:51 EDT        Cardiolite (Tc-99m sestamibi) stress test    HEART CATHETERIZATION  No results found for this or any previous visit.        Imaging  Chest X-ray:   Imaging Results (Last 24 Hours)       ** No results found for the last 24 hours. **            Lab Review: I have reviewed the labs      Results from last 7 days   Lab Units 25  0419   MAGNESIUM mg/dL 2.3     Results from last 7 days   Lab Units 25  0419   SODIUM mmol/L 146*   POTASSIUM mmol/L 3.9   BUN mg/dL 34*   CREATININE mg/dL 1.41*   CALCIUM mg/dL 9.0             Results from last 7 days   Lab Units 25  0419 25  0328 25  0459   WBC 10*3/mm3 10.19 9.83 8.97   HEMOGLOBIN g/dL 13.6 13.1 13.4   HEMATOCRIT % 44.2 41.9 42.3   PLATELETS 10*3/mm3 168 171 177     Results from last  7 days   Lab Units 25  1522   INR  1.16*   APTT seconds 29.1             Deric Johnson MD  3/14/2025, 10:06 EDT      EMR Dragon/Transcription:   Dictated utilizing Dragon dictation    Electronically signed by Deric Johnson MD at 25 1840       Nutrition Notes (most recent note)    No notes exist for this encounter.          Speech Language Pathology Notes (most recent note)        Cabrera Quiñones, SLP at 25 1430          Acute Care - Speech Language Pathology   Swallow Treatment Note [facility] Pool     Patient Name: Lynda Patterson  : 1940  MRN: [id number]  Today's Date: 3/13/2025               Admit Date: 3/11/2025    Visit Dx:     ICD-10-CM ICD-9-CM   1. Acute CVA (cerebrovascular accident)  I63.9 434.91     Patient Active Problem List   Diagnosis    Mixed hyperlipidemia    Macrocytic anemia    Type 2 diabetes mellitus with stage 3 chronic kidney disease, without long-term current use of insulin    Acute low back pain    Anxiety disorder    Carotid artery stenosis    Chronic kidney disease, stage III (moderate)    Depressive disorder    Disorder associated with type 2 diabetes mellitus    Encounter for general adult medical examination without abnormal findings    Fatigue    Gastroesophageal reflux disease    Gout    Hyperkalemia    Hypertension    Osteoarthritis    Postherpetic neuralgia    Shingles    Urinary incontinence    Vitamin D deficiency    Low back pain    Essential (primary) hypertension    Type 2 diabetes mellitus with diabetic chronic kidney disease    Degenerative drusen    Hypermetropia    Pseudophakia    Sprain of left ankle, initial encounter    Nondisplaced fracture of proximal end of left fibula    Encounter for general adult medical examination with abnormal findings    Obesity (BMI 30-39.9)    Splenic cyst    Status post placement of implantable loop recorder    Syncope    CVA (cerebral vascular accident)     Past Medical History:   Diagnosis  Date    Anxiety     Cognitive communication deficit     Dementia     Depression     Diabetic neuropathy     Difficulty walking     DM2 (diabetes mellitus, type 2)     GERD (gastroesophageal reflux disease)     Gout     Heart disease     Hyperlipidemia     Hypertension     Kidney failure     Stage three kidney failure , 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 Semi-reclined in bed upon entry on room air with son present. Son reports pt had been coughing prior to clinician's entry. SLP noted straws present on pt's meal tray. Removed, discarded, and educated on no straw recommendation. Son verbalized agreement and understanding. Pt agreeable to intervention, so HOB was positioned 90 degrees upright. Pt self-fed with SLP assistance x3 bites of puree and x3 drinks of nectar thick liquid by cup. Demonstrated adequate labial seal with no anterior spillage; clear voice after every trial; suspect timely swallow based on visualization; mild throat clear x2; delayed cough x1. Educated pt regarding safe swallow precautions- small intake and slow rate with no straws.        Recommendations:  - Puree and nectar thick liquids (NTL), no straw, w/ [place] Water Protocol (see guidelines below)  - Feeding assistance  - Strict aspiration and reflux precautions   - Meds: whole or crushed in puree or NTL  - Safe swallow strategies: upright during all PO/meds, slow rate of intake, small bites/sips,  alternate liquids and solids  - ST will continue to follow to ensure tolerance and safety of rec'd diet, and provide further recs as indicated     Water Protocol  The rationale to recommend water when a PO diet cannot appropriately/functionally sustain nutrition (or if thickened liquids are prescribed due to aspiration of thin liquids) is because water is low risk for aspiration pna when compared to aspiration of food or other liquids.    Benefits of a water protocol include but are not limited to:     Oral gratification  Engagement of oropharyngeal swallow musculature  Decrease likelihood of dehydration       Guidelines for proper implementation include:  Waiting a minimum of 30 minutes after consuming any other PO  Thorough oral care prior to consuming water  Upright at 90 degree hip flexion  Small sips at slow rate  Monitor for any changes in respiratory status and discontinue if distress noted     The [place] Free Water Protocol is a research based protocol established in 1984.                                                                          SWALLOW EVALUATION (Last 72 Hours)       SLP Adult Swallow Evaluation       Row Name 03/12/25 1300 03/12/25 0845                Rehab Evaluation    Document Type evaluation  -PF evaluation  -PF       Subjective Information no complaints  -PF no complaints  -PF       Patient Observations alert;cooperative  -PF cooperative;alert  -PF       Patient Effort good  -PF good  -PF       Symptoms Noted During/After Treatment none  -PF none  -PF          General Information    Patient Profile Reviewed yes  -PF yes  -PF       Pertinent History Of Current Problem -- Per H&P:  -PF       Current Method of Nutrition NPO  -PF NPO  -PF       Precautions/Limitations, Vision WFL;for purposes of eval  -PF WFL;for purposes of eval  -PF       Precautions/Limitations, Hearing WFL;for purposes of eval  -PF WFL;for purposes of eval  -PF       Prior Level of Function-Communication WFL  -PF WFL   -PF       Prior Level of Function-Swallowing puree;thin liquids  -PF puree;thin liquids  -PF       Plans/Goals Discussed with patient;family  -PF patient;family  -PF          Pain    Pretreatment Pain Rating 0/10 - no pain  -PF 0/10 - no pain  -PF       Posttreatment Pain Rating 0/10 - no pain  -PF 0/10 - no pain  -PF          Oral Motor Structure and Function    Dentition Assessment natural, present and adequate  -PF natural, present and adequate  -PF       Secretion Management WNL/WFL  -PF WNL/WFL  -PF       Mucosal Quality dry  -PF dry  -PF          Oral Musculature and Cranial Nerve Assessment    Oral Motor General Assessment generalized oral motor weakness  -PF generalized oral motor weakness  -PF          General Eating/Swallowing Observations    Respiratory Support Currently in Use room air  -PF room air  -PF       Eating/Swallowing Skills self-fed;fed by SLP;needed assist  -PF self-fed;fed by SLP;needed assist  -PF       Positioning During Eating upright 90 degree;upright in bed  -PF upright 90 degree;upright in bed  -PF       Utensils Used spoon;cup  -PF spoon;cup  -PF       Consistencies Trialed ice chips;thin liquids;pureed  -PF ice chips;thin liquids;pureed  -PF          Clinical Swallow Eval    Clinical Swallow Evaluation Summary -- Clinical swallow evaluation completed.  -PF          MBS/VFSS    Utensils Used spoon;cup;straw  -PF --       Consistencies Trialed nectar/syrup-thick liquids;thin liquids;pureed;mixed consistency;mechanical ground textures  -PF --          MBS/VFSS Interpretation    VFSS Summary VFSS completed.  -PF --          SLP Evaluation Clinical Impression    SLP Swallowing Diagnosis moderate;oral dysphagia;pharyngeal dysphagia  -PF moderate;oral dysphagia;suspected pharyngeal dysphagia;R/O pharyngeal dysphagia  -PF       Functional Impact risk of aspiration/pneumonia;risk of malnutrition;risk of dehydration  -PF risk of aspiration/pneumonia;risk of malnutrition;risk of dehydration   -PF       Rehab Potential/Prognosis, Swallowing good, to achieve stated therapy goals;adequate, monitor progress closely  -PF good, to achieve stated therapy goals;adequate, monitor progress closely  -PF       Swallow Criteria for Skilled Therapeutic Interventions Met demonstrates skilled criteria  -PF demonstrates skilled criteria  -PF          Recommendations    Therapy Frequency (Swallow) 3 days per week;4 days per week  -PF 3 days per week;4 days per week  -PF       Predicted Duration Therapy Intervention (Days) until discharge  -PF until discharge  -PF       SLP Diet Recommendation NPO  -PF NPO  -PF       Recommended Diagnostics VFSS (MBS)  -PF VFSS (MBS)  -PF       Oral Care Recommendations Oral Care BID/PRN;Before ice/water  -PF Oral Care BID/PRN;Before ice/water  -PF       SLP Rec. for Method of Medication Administration meds via alternate route  -PF meds via alternate route  -PF       Monitor for Signs of Aspiration yes;notify SLP if any concerns  -PF yes;notify SLP if any concerns  -PF          Swallow Goals (SLP)    Swallow LTGs Swallow Long Term Goal (free text)  -PF Swallow Long Term Goal (free text)  -PF       Swallow STGs diet tolerance goal selection (SLP)  -PF diet tolerance goal selection (SLP)  -PF       Diet Tolerance Goal Selection (SLP) Swallow Short Term Goal 1;Swallow Short Term Goal 2  -PF Swallow Short Term Goal 1;Swallow Short Term Goal 2  -PF          (LTG) Swallow    (LTG) Swallow Pt will maximize swallow function for least restrictive PO diet, exhibiting no complication associated with dysphagia, adequate PO intake, and demonstrating independent use of swallow compensation.  -PF --       Adrian (Swallow Long Term Goal) with minimal cues (75-90% accuracy)  -PF --       Time Frame (Swallow Long Term Goal) by discharge  -PF --       Progress/Outcomes (Swallow Long Term Goal) new goal  -PF --          (STG) Swallow 1    (STG) Swallow 1 The patient will participate in a full meal  assessment to determine safety and adequacy of recommended diet, independent use of safe swallow compensations, and additional goals/recommendations to follow.  -PF --       [place] (Swallow Short Term Goal 1) with minimal cues (75-90% accuracy)  -PF --       Time Frame (Swallow Short Term Goal 1) 1 week  -PF --       Progress/Outcomes (Swallow Short Term Goal 1) new goal  -PF --                 User Key  (r) = Recorded By, (t) = Taken By, (c) = Cosigned By      Initials Name Effective Dates    Zakiya Arora SLP 05/08/23 -                     EDUCATION  The patient has been educated in the following areas:   Dysphagia (Swallowing Impairment) Modified Diet Instruction.        SLP GOALS       Row Name 03/13/25 1400       (LTG) Swallow    (LTG) Swallow Pt will maximize swallow function for least restrictive PO diet, exhibiting no complication associated with dysphagia, adequate PO intake, and demonstrating independent use of swallow compensation.  -MB    [place] (Swallow Long Term Goal) with minimal cues (75-90% accuracy)  -MB    Time Frame (Swallow Long Term Goal) by discharge  -MB    Progress/Outcomes (Swallow Long Term Goal) goal ongoing  -MB       (STG) Swallow 1    (STG) Swallow 1 The patient will participate in a full meal assessment to determine safety and adequacy of recommended diet, independent use of safe swallow compensations, and additional goals/recommendations to follow.  -MB    [place] (Swallow Short Term Goal 1) with minimal cues (75-90% accuracy)  -MB    Time Frame (Swallow Short Term Goal 1) 1 week  -MB    Progress/Outcomes (Swallow Short Term Goal 1) goal ongoing  -MB    Comment (Swallow Short Term Goal 1) See above  -MB              User Key  (r) = Recorded By, (t) = Taken By, (c) = Cosigned By      Initials Name Provider Type    Zakiya Arora SLP Speech and Language Pathologist    Cabrera Julien, SLP Speech and Language Pathologist                         Time  Calculation:                GURJIT Garza  3/13/2025    Electronically signed by Cabrera Quiñones, SLP at 25 1443     Refugio Corcoran OT   Occupational Therapist  Specialty:  Occupational Therapy  Therapy Evaluation     Signed  Date of Service:  25  Creation Time:  25     Signed        Expand All Collapse All  Patient Name: Lynda Patterson                : 1940                        MRN: [id number]                              Today's Date: 3/13/2025                                   Admit Date: 3/11/2025                        Visit Dx:   Visit Diagnosis       ICD-10-CM ICD-9-CM   1. Acute CVA (cerebrovascular accident)  I63.9 434.91         Problem List       Patient Active Problem List   Diagnosis    Mixed hyperlipidemia    Macrocytic anemia    Type 2 diabetes mellitus with stage 3 chronic kidney disease, without long-term current use of insulin    Acute low back pain    Anxiety disorder    Carotid artery stenosis    Chronic kidney disease, stage III (moderate)    Depressive disorder    Disorder associated with type 2 diabetes mellitus    Encounter for general adult medical examination without abnormal findings    Fatigue    Gastroesophageal reflux disease    Gout    Hyperkalemia    Hypertension    Osteoarthritis    Postherpetic neuralgia    Shingles    Urinary incontinence    Vitamin D deficiency    Low back pain    Essential (primary) hypertension    Type 2 diabetes mellitus with diabetic chronic kidney disease    Degenerative drusen    Hypermetropia    Pseudophakia    Sprain of left ankle, initial encounter    Nondisplaced fracture of proximal end of left fibula    Encounter for general adult medical examination with abnormal findings    Obesity (BMI 30-39.9)    Splenic cyst    Status post placement of implantable loop recorder    Syncope    CVA (cerebral vascular accident)         Medical History        Past Medical History:   Diagnosis Date    Anxiety       Cognitive communication deficit      Dementia      Depression      Diabetic neuropathy      Difficulty walking      DM2 (diabetes mellitus, type 2)      GERD (gastroesophageal reflux disease)      Gout      Heart disease      Hyperlipidemia      Hypertension      Kidney failure       Stage three kidney failure , 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    Macular degeneration      Mild cognitive impairment of uncertain or unknown etiology      Muscle weakness (generalized)      Nondisplaced fracture of proximal end of left fibula 09/03/2021    Reduced mobility      Repeated falls      Sprain of left ankle, initial encounter 08/20/2021    Syncope      Urinary tract infection      Vitamin D deficiency           Surgical History         Past Surgical History:   Procedure Laterality Date    CHOLECYSTECTOMY   1988    TOTAL ABDOMINAL HYSTERECTOMY   1970           General Information         Row Name 03/13/25 1332                 OT Time and Intention     Document Type evaluation  -SP       Mode of Treatment occupational therapy  -SP          Row Name 03/13/25 1332                 General Information     Patient Profile Reviewed yes  -SP       Prior Level of Function max assist:;dependent:  Pt feeds self, A for dressing/bathing. Uses w/c, either sully lift or A x2 for transfers, unable to self propel  -SP       Existing Precautions/Restrictions fall  -SP       Barriers to Rehab medically complex;previous functional deficit;cognitive status  -SP          Row Name 03/13/25 1332                 Living Environment     Current Living Arrangements extended care facility  -SP       People in Home facility resident;other (see comments)  Son reports pts spouse also lives at [facility] in memory care unit, pt is on the skilled unit  -SP          Row Name 03/13/25 133                 Cognition     Orientation Status (Cognition) oriented to;person;disoriented to;place;situation;time  -SP          Row Name 03/13/25 1339                 Safety  Issues/Impairments Affecting Functional Mobility     Safety Issues Affecting Function (Mobility) insight into deficits/self-awareness;judgment;problem-solving;sequencing abilities;awareness of need for assistance  -SP       Impairments Affecting Function (Mobility) balance;cognition;postural/trunk control;strength;endurance/activity tolerance  -SP       Cognitive Impairments, Mobility Safety/Performance insight into deficits/self-awareness;judgment;problem-solving/reasoning;safety precaution awareness;sequencing abilities  -SP                       User Key  (r) = Recorded By, (t) = Taken By, (c) = Cosigned By        Initials Name Provider Type     SP Refugio Corcoran OT Occupational Therapist                                     Mobility/ADL's         Row Name 03/13/25 1341                 Bed Mobility     Bed Mobility rolling left;rolling right;scooting/bridging;supine-sit;sit-supine  -SP       Rolling Left [place] (Bed Mobility) minimum assist (75% patient effort);moderate assist (50% patient effort)  -SP       Rolling Right [place] (Bed Mobility) minimum assist (75% patient effort);moderate assist (50% patient effort)  -SP       Scooting/Bridging [place] (Bed Mobility) dependent (less than 25% patient effort);2 person assist  -SP       Supine-Sit [place] (Bed Mobility) maximum assist (25% patient effort);2 person assist  -SP       Sit-Supine [place] (Bed Mobility) maximum assist (25% patient effort);2 person assist  -SP       Assistive Device (Bed Mobility) repositioning sheet;head of bed elevated;bed rails  -SP          Row Name 03/13/25 1341                 Transfers     Transfers bed-chair transfer;sit-stand transfer  -SP          Row Name 03/13/25 1341                 Bed-Chair Transfer     Bed-Chair [place] (Transfers) not tested  -SP          Row Name 03/13/25 1341                 Sit-Stand Transfer     Sit-Stand [place] (Transfers) not tested  -SP          Row Name  03/13/25 1341                 Functional Mobility     Patient was able to Ambulate no, other medical factors prevent ambulation  -SP       Reason Patient was unable to Ambulate Non-Ambulatory at Baseline  -SP          Row Name 03/13/25 1341                 Activities of Daily Living     BADL Assessment/Intervention lower body dressing;toileting  -SP          Row Name 03/13/25 1341                 Lower Body Dressing Assessment/Training     [place] Level (Lower Body Dressing) don;socks;dependent (less than 25% patient effort)  -SP          Row Name 03/13/25 1341                 Toileting Assessment/Training     [place] Level (Toileting) perform perineal hygiene;dependent (less than 25% patient effort);adjust/manage clothing  -SP       Position (Toileting) supine  -SP                       User Key  (r) = Recorded By, (t) = Taken By, (c) = Cosigned By        Initials Name Provider Type     Refugio Murillo OT Occupational Therapist                            Obj/Interventions         Row Name 03/13/25 1342                 Sensory Assessment (Somatosensory)     Sensory Assessment (Somatosensory) unable/difficult to assess  -SP          Row Name 03/13/25 1342                 Vision Assessment/Intervention     Visual Impairment/Limitations unable/difficult to assess  -SP          Row Name 03/13/25 1342                 Range of Motion Comprehensive     General Range of Motion bilateral upper extremity ROM WFL  -SP          Row Name 03/13/25 1342                 Strength Comprehensive (MMT)     Comment, General Manual Muscle Testing (MMT) Assessment BUE grossly 3/5,  4/5  -SP          Row Name 03/13/25 1342                 Balance     Balance Assessment sitting static balance  -SP       Static Sitting Balance moderate assist;other (see comments)  initially with posterior lean requiring mod/max A for support, with time and reaching activity, pt able to sit with SB/CGA at times.  -SP                        User Key  (r) = Recorded By, (t) = Taken By, (c) = Cosigned By        Initials Name Provider Type     SP Refugio Corcoran, TRISTON Occupational Therapist                            Goals/Plan    No documentation.                        Clinical Impression         Row Name 03/13/25 1885                 Pain Scale: FACES Pre/Post-Treatment     Pain: FACES Scale, Pretreatment 0-->no hurt  -SP       Posttreatment Pain Rating 0-->no hurt  -SP          Row Name 03/13/25 2737                 Plan of Care Review     Plan of Care Reviewed With patient;son  -SP       Outcome Evaluation 85 y.o. female with PMH of type 2 diabetes, CKD stage III, anxiety, GERD presented from an extended care facility to the hospital for sudden onset weakness and inability to speak. She had expressive aphasia and hemiparesis on the right side. CT perfusion showed a significant delay in the left MCA distribution and CTA confirmed an M2 occlusion. TNK administered at 1539 on 3/11/25 and pt admitted to ICU level. At baseline, pt at ECF. She is able to feed herself, though requires assist for ADLs, sully lift at baseline, otherwise heavy x2 assist for transfers. Upon assessment, pt supine in bed and oriented to first/last name only with verbal cueing. Pt completes rolling L/R with min/mod assist to dependently complete alex-care and comes to sitting EOB with max A x2. Pt fluctuates between CGA/max assist to sustain static sitting balance. Max A x2 to return to supine. Pt dependent assist to don socks, though does initiate. Adult son in room present providing PLOF and pt appears near her baseline at this time. OT will sign off and complete orders, please re-consult if pt has a change in status. OT recommending return to SNF.  -SP          Row Name 03/13/25 7507                 Therapy Assessment/Plan (OT)     Criteria for Skilled Therapeutic Interventions Met (OT) no  -SP       Therapy Frequency (OT) evaluation only  -SP          Row Name 03/13/25 4401                  Therapy Plan Review/Discharge Plan (OT)     Anticipated Discharge Disposition (OT) skilled nursing facility  -SP          Row Name 03/13/25 1343                 Vital Signs     Pre Systolic BP Rehab 141  -SP       Pre Treatment Diastolic BP 84  -SP       Intra Systolic BP Rehab 97  -SP       Intra Treatment Diastolic BP 52  -SP       Post Systolic BP Rehab 112  -SP       Post Treatment Diastolic BP 92  -SP       O2 Delivery Pre Treatment room air  -SP       O2 Delivery Intra Treatment room air  -SP       O2 Delivery Post Treatment room air  -SP       Pre Patient Position Supine  -SP       Intra Patient Position Sitting  -SP       Post Patient Position Supine  -SP          Row Name 03/13/25 1343                 Positioning and Restraints     Pre-Treatment Position in bed  -SP       Post Treatment Position bed  -SP       In Bed notified nsg;with family/caregiver;side lying left;call light within reach;encouraged to call for assist  -SP                       User Key  (r) = Recorded By, (t) = Taken By, (c) = Cosigned By        Initials Name Provider Type     Refugio Murillo, OT Occupational Therapist                            Outcome Measures         Row Name 03/13/25 1345                 How much help from another is currently needed...     Putting on and taking off regular lower body clothing? 1  -SP       Bathing (including washing, rinsing, and drying) 1  -SP       Toileting (which includes using toilet bed pan or urinal) 1  -SP       Putting on and taking off regular upper body clothing 1  -SP       Taking care of personal grooming (such as brushing teeth) 2  -SP       Eating meals 3  -SP       AM-PAC 6 Clicks Score (OT) 9  -SP          Row Name 03/13/25 1032 03/13/25 0800            How much help from another person do you currently need...     Turning from your back to your side while in flat bed without using bedrails? 2  -JH 2  -NH     Moving from lying on back to sitting on the side of a flat  bed without bedrails? 2  - 2  -NH     Moving to and from a bed to a chair (including a wheelchair)? 1  - 1  -NH     Standing up from a chair using your arms (e.g., wheelchair, bedside chair)? 1  - 1  -NH     Climbing 3-5 steps with a railing? 1  - 1  -NH     To walk in hospital room? 1  - 1  -NH     AM-PAC 6 Clicks Score (PT) 8  - 8  -NH     Highest Level of Mobility Goal 3 --> Sit at edge of bed  - 3 --> Sit at edge of bed  -NH        Row Name 03/13/25 1032                 Modified Morton Scale     Modified Morton Scale 5 - Severe disability.  Bedridden, incontinent, and requiring constant nursing care and attention.  -          Row Name 03/13/25 1345 03/13/25 1032            Functional Assessment     Outcome Measure Options AM-PAC 6 Clicks Daily Activity (OT)  -SP AM-PAC 6 Clicks Basic Mobility (PT);Modified Morton  -                     User Key  (r) = Recorded By, (t) = Taken By, (c) = Cosigned By        Initials Name Provider Type      Elsie Ward, PT Physical Therapist     Refugio Murillo, OT Occupational Therapist     Sydnee Gerber, RN Registered Nurse                             Occupational Therapy Education            Title: PT OT SLP Therapies (Done)         Topic: Occupational Therapy (Done)         Point: ADL training (Done)         Learning Progress Summary             Patient Acceptance, E,TB, VU,NR by SP at 3/13/2025 1345                            Point: Home exercise program (Done)         Learning Progress Summary             Patient Acceptance, E,TB, VU,NR by SP at 3/13/2025 1345                            Point: Precautions (Done)         Learning Progress Summary             Patient Acceptance, E,TB, VU,NR by SP at 3/13/2025 1345                            Point: Body mechanics (Done)         Learning Progress Summary             Patient Acceptance, E,TB, VU,NR by SP at 3/13/2025 1345                                                User Key         Initials  Effective Dates Name Provider Type Discipline     SP 11/15/23 -  Refugio Corcoran OT Occupational Therapist OT                          OT Recommendation and Plan  Therapy Frequency (OT): evaluation only  Plan of Care Review  Plan of Care Reviewed With: patient, son  Outcome Evaluation: 85 y.o. female with PMH of type 2 diabetes, CKD stage III, anxiety, GERD presented from an extended care facility to the hospital for sudden onset weakness and inability to speak. She had expressive aphasia and hemiparesis on the right side. CT perfusion showed a significant delay in the left MCA distribution and CTA confirmed an M2 occlusion. TNK administered at 1539 on 3/11/25 and pt admitted to ICU level. At baseline, pt at ECF. She is able to feed herself, though requires assist for ADLs, sully lift at baseline, otherwise heavy x2 assist for transfers. Upon assessment, pt supine in bed and oriented to first/last name only with verbal cueing. Pt completes rolling L/R with min/mod assist to dependently complete alex-care and comes to sitting EOB with max A x2. Pt fluctuates between CGA/max assist to sustain static sitting balance. Max A x2 to return to supine. Pt dependent assist to don socks, though does initiate. Adult son in room present providing PLOF and pt appears near her baseline at this time. OT will sign off and complete orders, please re-consult if pt has a change in status. OT recommending return to SNF.      Time Calculation:        Time Calculation- OT         Row Name 03/13/25 1345                       Time Calculation- OT     OT Start Time 0912  -SP         OT Stop Time 0945  -SP         OT Time Calculation (min) 33 min  -SP         OT Received On 03/13/25  -SP                      User Key  (r) = Recorded By, (t) = Taken By, (c) = Cosigned By        Initials Name Provider Type     SP Refugio Corcoran OT Occupational Therapist                       Therapy Charges for Today         Code Description Service Date  Service Provider Modifiers Qty     [id number] HC OT EVAL MOD COMPLEXITY 4 3/13/2025 Refugio Corcoran, OT GO 1                   Refugio Corcoran, OT               3/13/2025                Elsie Ward, TIFFANI   Physical Therapist  Physical Therapy  Therapy Evaluation     Signed  Date of Service:  25  Creation Time:  25     Signed        Expand All Collapse All  Patient Name: Lynda Patterson                : 1940                        MRN: [id number]                              Today's Date: 3/13/2025                                   Admit Date: 3/11/2025                        Visit Dx:   Visit Diagnosis       ICD-10-CM ICD-9-CM   1. Acute CVA (cerebrovascular accident)  I63.9 434.91         Problem List       Patient Active Problem List   Diagnosis    Mixed hyperlipidemia    Macrocytic anemia    Type 2 diabetes mellitus with stage 3 chronic kidney disease, without long-term current use of insulin    Acute low back pain    Anxiety disorder    Carotid artery stenosis    Chronic kidney disease, stage III (moderate)    Depressive disorder    Disorder associated with type 2 diabetes mellitus    Encounter for general adult medical examination without abnormal findings    Fatigue    Gastroesophageal reflux disease    Gout    Hyperkalemia    Hypertension    Osteoarthritis    Postherpetic neuralgia    Shingles    Urinary incontinence    Vitamin D deficiency    Low back pain    Essential (primary) hypertension    Type 2 diabetes mellitus with diabetic chronic kidney disease    Degenerative drusen    Hypermetropia    Pseudophakia    Sprain of left ankle, initial encounter    Nondisplaced fracture of proximal end of left fibula    Encounter for general adult medical examination with abnormal findings    Obesity (BMI 30-39.9)    Splenic cyst    Status post placement of implantable loop recorder    Syncope    CVA (cerebral vascular accident)         Medical History        Past Medical History:    Diagnosis Date    Anxiety      Cognitive communication deficit      Dementia      Depression      Diabetic neuropathy      Difficulty walking      DM2 (diabetes mellitus, type 2)      GERD (gastroesophageal reflux disease)      Gout      Heart disease      Hyperlipidemia      Hypertension      Kidney failure       Stage three kidney failure , 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    Macular degeneration      Mild cognitive impairment of uncertain or unknown etiology      Muscle weakness (generalized)      Nondisplaced fracture of proximal end of left fibula 09/03/2021    Reduced mobility      Repeated falls      Sprain of left ankle, initial encounter 08/20/2021    Syncope      Urinary tract infection      Vitamin D deficiency           Surgical History         Past Surgical History:   Procedure Laterality Date    CHOLECYSTECTOMY   1988    TOTAL ABDOMINAL HYSTERECTOMY   1970           General Information         Row Name 03/13/25 1016                 Physical Therapy Time and Intention     Document Type evaluation  -       Mode of Treatment physical therapy  -          Row Name 03/13/25 1016                 General Information     Patient Profile Reviewed yes  -       Prior Level of Function max assist:;dependent:  pt feeds self, A for dressing/bathing.  uses w/c, either sully lift or A x2 for transfers,  unable to self propel  -       Existing Precautions/Restrictions fall  -       Barriers to Rehab medically complex;previous functional deficit;cognitive status  -          Row Name 03/13/25 1016                 Living Environment     Current Living Arrangements extended care facility  -       People in Home other (see comments);facility resident  son reports pts spouse also lives at [facility] in memory care unit, pt is on the skilled unit  -          Row Name 03/13/25 1016                 Cognition     Orientation Status (Cognition) oriented to;person;disoriented to;place;situation;time  -          Row Name  03/13/25 1016                 Safety Issues/Impairments Affecting Functional Mobility     Safety Issues Affecting Function (Mobility) insight into deficits/self-awareness;judgment;problem-solving;sequencing abilities  -       Impairments Affecting Function (Mobility) balance;cognition;postural/trunk control;strength  -                       User Key  (r) = Recorded By, (t) = Taken By, (c) = Cosigned By        Initials Name Provider Type      Elsie Ward, TIFFANI Physical Therapist                            Mobility         Row Name 03/13/25 1020                 Bed Mobility     Bed Mobility rolling left;rolling right;scooting/bridging;supine-sit;sit-supine  -       Rolling Left [place] (Bed Mobility) minimum assist (75% patient effort);moderate assist (50% patient effort)  -       Rolling Right [place] (Bed Mobility) minimum assist (75% patient effort);moderate assist (50% patient effort)  -       Scooting/Bridging [place] (Bed Mobility) dependent (less than 25% patient effort)  -       Supine-Sit [place] (Bed Mobility) maximum assist (25% patient effort);2 person assist  -       Sit-Supine [place] (Bed Mobility) maximum assist (25% patient effort);2 person assist  -       Assistive Device (Bed Mobility) repositioning sheet;head of bed elevated;bed rails  -       Comment, (Bed Mobility) pt initiates A with rolling and supine to sit  -          Row Name 03/13/25 1020                 Bed-Chair Transfer     Bed-Chair [place] (Transfers) not tested  -          Row Name 03/13/25 1020                 Sit-Stand Transfer     Sit-Stand [place] (Transfers) not tested  -          Row Name 03/13/25 1020                 Gait/Stairs (Locomotion)     [place] Level (Gait) not tested  -                       User Key  (r) = Recorded By, (t) = Taken By, (c) = Cosigned By        Initials Name Provider Type     Elsie Castañeda, PT Physical Therapist                             Obj/Interventions         Row Name 03/13/25 1021                 Range of Motion Comprehensive     General Range of Motion bilateral lower extremity ROM WFL  -JH          Row Name 03/13/25 1021                 Strength Comprehensive (MMT)     Comment, General Manual Muscle Testing (MMT) Assessment gross strength BLEs 3/5, appears equal bilaterally  -JH          Row Name 03/13/25 1021                 Balance     Balance Assessment sitting static balance;sitting dynamic balance  -       Static Sitting Balance moderate assist;other (see comments)  initially with posterior lean requiring mod/max A for support, with time and reaching activity, pt able to sit with SB/CGA at times.  -JH          Row Name 03/13/25 1021                 Sensory Assessment (Somatosensory)     Sensory Assessment (Somatosensory) unable/difficult to assess  -                       User Key  (r) = Recorded By, (t) = Taken By, (c) = Cosigned By        Initials Name Provider Type     Elsie Castañeda, PT Physical Therapist                            Goals/Plan    No documentation.                        Clinical Impression         Row Name 03/13/25 1022                 Pain     Additional Documentation Pain Scale: FACES Pre/Post-Treatment (Group)  -JH          Row Name 03/13/25 1022                 Pain Scale: FACES Pre/Post-Treatment     Pain: FACES Scale, Pretreatment 0-->no hurt  -       Posttreatment Pain Rating 0-->no hurt  -JH          Row Name 03/13/25 1022                 Plan of Care Review     Plan of Care Reviewed With patient;family  -       Outcome Evaluation 86 yo female admitted from [facility] with R side weakness and aphasia.  Pt was given TNK for L MCA stroke.  Pt has improved with movement in R side and ability to speak.  Pts son present for eval.  Reports she is w/c bound at baseline via use of sully lift or heavy A x2 for transfers.  She is unable to propel self in w/c and has A for all ADLs.  She does typically feed  self.  This date, pt was able to state her name and particiapting in conversation.  She required mod/max A x2 to sit EOB.  Varied A for sitting balance with frequent posterior lean.  Pt appears near her baseline functional status [facility] plan for return to SNF at d/c.  No further skilled PT needs while IP, would require sully lift transfer OOB to chair.  -[facility] Name 03/13/25 1022                 Therapy Assessment/Plan (PT)     Criteria for Skilled Interventions Met (PT) no;does not meet criteria for skilled intervention  -       Therapy Frequency (PT) evaluation only  -[facility] Name 03/13/25 1022                 Vital Signs     Pre Systolic BP Rehab 141  -       Pre Treatment Diastolic BP 84  supine  -       Intra Systolic BP Rehab 97  -       Intra Treatment Diastolic BP 52  sitting  -       Post Systolic BP Rehab 112  -JH       Post Treatment Diastolic BP 54  supine  -       O2 Delivery Pre Treatment room air  -       O2 Delivery Intra Treatment room air  -       O2 Delivery Post Treatment room air  -[facility] Name 03/13/25 1022                 Positioning and Restraints     Pre-Treatment Position in bed  -       Post Treatment Position bed  -       In Bed side lying left;with nsg;call light within reach;with family/caregiver  -                       User Key  (r) = Recorded By, (t) = Taken By, (c) = Cosigned By        Initials Name Provider Type     Elsie Castañeda, PT Physical Therapist                            Outcome Measures         Row Name 03/13/25 1032                 How much help from another person do you currently need...     Turning from your back to your side while in flat bed without using bedrails? 2  -JH       Moving from lying on back to sitting on the side of a flat bed without bedrails? 2  -JH       Moving to and from a bed to a chair (including a wheelchair)? 1  -JH       Standing up from a chair using your arms (e.g., wheelchair, bedside chair)? 1   -       Climbing 3-5 steps with a railing? 1  -       To walk in hospital room? 1  -       AM-PAC 6 Clicks Score (PT) 8  -       Highest Level of Mobility Goal 3 --> Sit at edge of bed  -          Row Name 03/13/25 1032                 Modified [place] Scale     Modified [place] Scale 5 - Severe disability.  Bedridden, incontinent, and requiring constant nursing care and attention.  -          Row Name 03/13/25 1032                 Functional Assessment     Outcome Measure Options AM-PAC 6 Clicks Basic Mobility (PT);Modified [place]  -                       User Key  (r) = Recorded By, (t) = Taken By, (c) = Cosigned By        Initials Name Provider Type      Elsie Ward, TIFFANI Physical Therapist                                   PT Recommendation and Plan  Outcome Evaluation: 84 yo female admitted from [facility] with R side weakness and aphasia.  Pt was given TNK for L MCA stroke.  Pt has improved with movement in R side and ability to speak.  Pts son present for eval.  Reports she is w/c bound at baseline via use of sully lift or heavy A x2 for transfers.  She is unable to propel self in w/c and has A for all ADLs.  She does typically feed self.  This date, pt was able to state her name and particiapting in conversation.  She required mod/max A x2 to sit EOB.  Varied A for sitting balance with frequent posterior lean.  Pt appears near her baseline functional status [facility] plan for return to SNF at d/c.  No further skilled PT needs while IP, would require sully lift transfer OOB to chair.      Time Calculation:        PT Charges         Row Name 03/13/25 1034                       Time Calculation     Start Time 0912  -         Stop Time 0945  -         Time Calculation (min) 33 min  -         PT Received On 03/13/25  -                 Time Calculation- PT     Total Timed Code Minutes- PT 0 minute(s)  -                      User Key  (r) = Recorded By, (t) = Taken By, (c) = Cosigned By        Initials  Name Provider Type      Elsie Ward, PT Physical Therapist                       Therapy Charges for Today         Code Description Service Date Service Provider Modifiers Qty     [id number] HC PT EVAL MOD COMPLEXITY 4 3/13/2025 Elsie Ward, PT GP 1                PT G-Codes  Outcome Measure Options: AM-PAC 6 Clicks Basic Mobility (PT), Modified [place]  AM-PAC 6 Clicks Score (PT): 8  Modified [place] Scale: 5 - Severe disability.  Bedridden, incontinent, and requiring constant nursing care and attention.  PT Discharge Summary  Anticipated Discharge Disposition (PT): skilled nursing facility     Elsie Ward PT                      3/13/2025

## 2025-03-15 NOTE — SIGNIFICANT NOTE
03/15/25 1236   Post Acute Pre-Cert Documentation   Request Submitted by Facility - Type: Hospital   Post-Acute Authorization Type Submitted: SNF   Date Post Acute Pre-Cert Inititated per Facility 03/15/25   Accepting Facility [facility]   Hospital Discharge Date Requested 03/16/25   Had Accepting Facility at Time of Submission Yes   Response Communicated to:    Authorization Number: [id number]   Post Acute Pre-Cert Initiated Comment UR RN submitted SNF precert to [facility] via [employer] - pending - cm made aware

## 2025-03-16 LAB
ALBUMIN SERPL-MCNC: 3.6 G/DL (ref 3.5–5.2)
ALBUMIN/GLOB SERPL: 1.2 G/DL
ALP SERPL-CCNC: 60 U/L (ref 39–117)
ALT SERPL W P-5'-P-CCNC: 14 U/L (ref 1–33)
ANION GAP SERPL CALCULATED.3IONS-SCNC: 13.2 MMOL/L (ref 5–15)
AST SERPL-CCNC: 34 U/L (ref 1–32)
BASOPHILS # BLD AUTO: 0.02 10*3/MM3 (ref 0–0.2)
BASOPHILS NFR BLD AUTO: 0.2 % (ref 0–1.5)
BILIRUB SERPL-MCNC: 0.8 MG/DL (ref 0–1.2)
BUN SERPL-MCNC: 23 MG/DL (ref 8–23)
BUN/CREAT SERPL: 18.5 (ref 7–25)
CALCIUM SPEC-SCNC: 8.9 MG/DL (ref 8.6–10.5)
CHLORIDE SERPL-SCNC: 109 MMOL/L (ref 98–107)
CO2 SERPL-SCNC: 21.8 MMOL/L (ref 22–29)
CREAT SERPL-MCNC: 1.24 MG/DL (ref 0.57–1)
DEPRECATED RDW RBC AUTO: 46.1 FL (ref 37–54)
EGFRCR SERPLBLD CKD-EPI 2021: 42.2 ML/MIN/1.73
EOSINOPHIL # BLD AUTO: 0.2 10*3/MM3 (ref 0–0.4)
EOSINOPHIL NFR BLD AUTO: 1.9 % (ref 0.3–6.2)
ERYTHROCYTE [DISTWIDTH] IN BLOOD BY AUTOMATED COUNT: 12.8 % (ref 12.3–15.4)
GLOBULIN UR ELPH-MCNC: 3.1 GM/DL
GLUCOSE BLDC GLUCOMTR-MCNC: 103 MG/DL (ref 70–105)
GLUCOSE BLDC GLUCOMTR-MCNC: 69 MG/DL (ref 70–105)
GLUCOSE BLDC GLUCOMTR-MCNC: 79 MG/DL (ref 70–105)
GLUCOSE BLDC GLUCOMTR-MCNC: 86 MG/DL (ref 70–105)
GLUCOSE BLDC GLUCOMTR-MCNC: 94 MG/DL (ref 70–105)
GLUCOSE SERPL-MCNC: 104 MG/DL (ref 65–99)
HCT VFR BLD AUTO: 42.1 % (ref 34–46.6)
HGB BLD-MCNC: 13.2 G/DL (ref 12–15.9)
IMM GRANULOCYTES # BLD AUTO: 0.05 10*3/MM3 (ref 0–0.05)
IMM GRANULOCYTES NFR BLD AUTO: 0.5 % (ref 0–0.5)
LYMPHOCYTES # BLD AUTO: 2.7 10*3/MM3 (ref 0.7–3.1)
LYMPHOCYTES NFR BLD AUTO: 25.3 % (ref 19.6–45.3)
MAGNESIUM SERPL-MCNC: 2.1 MG/DL (ref 1.6–2.4)
MCH RBC QN AUTO: 30.4 PG (ref 26.6–33)
MCHC RBC AUTO-ENTMCNC: 31.4 G/DL (ref 31.5–35.7)
MCV RBC AUTO: 97 FL (ref 79–97)
MONOCYTES # BLD AUTO: 0.86 10*3/MM3 (ref 0.1–0.9)
MONOCYTES NFR BLD AUTO: 8.1 % (ref 5–12)
NEUTROPHILS NFR BLD AUTO: 6.84 10*3/MM3 (ref 1.7–7)
NEUTROPHILS NFR BLD AUTO: 64 % (ref 42.7–76)
NRBC BLD AUTO-RTO: 0 /100 WBC (ref 0–0.2)
PHOSPHATE SERPL-MCNC: 2.1 MG/DL (ref 2.5–4.5)
PHOSPHATE SERPL-MCNC: 2.5 MG/DL (ref 2.5–4.5)
PLATELET # BLD AUTO: 204 10*3/MM3 (ref 140–450)
PMV BLD AUTO: 10.5 FL (ref 6–12)
POTASSIUM SERPL-SCNC: 3.1 MMOL/L (ref 3.5–5.2)
POTASSIUM SERPL-SCNC: 4.5 MMOL/L (ref 3.5–5.2)
PROT SERPL-MCNC: 6.7 G/DL (ref 6–8.5)
RBC # BLD AUTO: 4.34 10*6/MM3 (ref 3.77–5.28)
SODIUM SERPL-SCNC: 144 MMOL/L (ref 136–145)
WBC NRBC COR # BLD AUTO: 10.67 10*3/MM3 (ref 3.4–10.8)

## 2025-03-16 PROCEDURE — 84132 ASSAY OF SERUM POTASSIUM: CPT | Performed by: STUDENT IN AN ORGANIZED HEALTH CARE EDUCATION/TRAINING PROGRAM

## 2025-03-16 PROCEDURE — 84100 ASSAY OF PHOSPHORUS: CPT

## 2025-03-16 PROCEDURE — 82948 REAGENT STRIP/BLOOD GLUCOSE: CPT

## 2025-03-16 PROCEDURE — 85025 COMPLETE CBC W/AUTO DIFF WBC: CPT

## 2025-03-16 PROCEDURE — 84100 ASSAY OF PHOSPHORUS: CPT | Performed by: STUDENT IN AN ORGANIZED HEALTH CARE EDUCATION/TRAINING PROGRAM

## 2025-03-16 PROCEDURE — 93010 ELECTROCARDIOGRAM REPORT: CPT | Performed by: INTERNAL MEDICINE

## 2025-03-16 PROCEDURE — 80053 COMPREHEN METABOLIC PANEL: CPT

## 2025-03-16 PROCEDURE — 93005 ELECTROCARDIOGRAM TRACING: CPT | Performed by: STUDENT IN AN ORGANIZED HEALTH CARE EDUCATION/TRAINING PROGRAM

## 2025-03-16 PROCEDURE — 82948 REAGENT STRIP/BLOOD GLUCOSE: CPT | Performed by: INTERNAL MEDICINE

## 2025-03-16 PROCEDURE — 25810000003 SODIUM CHLORIDE 0.9 % SOLUTION: Performed by: STUDENT IN AN ORGANIZED HEALTH CARE EDUCATION/TRAINING PROGRAM

## 2025-03-16 PROCEDURE — 83735 ASSAY OF MAGNESIUM: CPT

## 2025-03-16 RX ORDER — POTASSIUM CHLORIDE 1.5 G/1.58G
40 POWDER, FOR SOLUTION ORAL ONCE
Status: COMPLETED | OUTPATIENT
Start: 2025-03-16 | End: 2025-03-16

## 2025-03-16 RX ADMIN — SENNOSIDES AND DOCUSATE SODIUM 2 TABLET: 50; 8.6 TABLET ORAL at 20:47

## 2025-03-16 RX ADMIN — ASPIRIN 325 MG ORAL TABLET 325 MG: 325 PILL ORAL at 09:10

## 2025-03-16 RX ADMIN — ATORVASTATIN CALCIUM 80 MG: 40 TABLET, FILM COATED ORAL at 20:47

## 2025-03-16 RX ADMIN — POTASSIUM CHLORIDE 40 MEQ: 1.5 POWDER, FOR SOLUTION ORAL at 05:12

## 2025-03-16 RX ADMIN — Medication 10 ML: at 09:10

## 2025-03-16 RX ADMIN — SODIUM CHLORIDE 15 MMOL: 9 INJECTION, SOLUTION INTRAVENOUS at 05:13

## 2025-03-16 RX ADMIN — POTASSIUM CHLORIDE 40 MEQ: 1.5 POWDER, FOR SOLUTION ORAL at 10:58

## 2025-03-16 RX ADMIN — MUPIROCIN 1 APPLICATION: 20 OINTMENT TOPICAL at 09:10

## 2025-03-16 RX ADMIN — SENNOSIDES AND DOCUSATE SODIUM 2 TABLET: 50; 8.6 TABLET ORAL at 09:09

## 2025-03-16 RX ADMIN — Medication 10 ML: at 20:47

## 2025-03-16 NOTE — CASE MANAGEMENT/SOCIAL WORK
Continued Stay Note  KHOI Pool     Patient Name: Lynda Patterson  MRN: [id number]  Today's Date: 3/16/2025    Admit Date: 3/11/2025    Plan: From [facility].  Returning Skilled. Pre-cert started 3/15. Return PASRR completed.   Discharge Plan       Row Name 03/16/25 1126       Plan    Plan Comments Pre-cert still pending as of 3/16 1100. Attending MD notified.                  Minnie Martino RN     Office: [phone]  Fax: [phone]

## 2025-03-16 NOTE — PROGRESS NOTES
[facility] MEDICINE SERVICE  DAILY PROGRESS NOTE    NAME: Lynda Patterson  : 1940  MRN: [id number]      LOS: 5 days     PROVIDER OF SERVICE: Valentin Burrell MD    Chief Complaint: CVA (cerebral vascular accident)    Subjective:   Denies any new complaints. Feels better this am.   Son and DIL at bed side.       Review of Systems:   All 21 ROS were negative except mentioned above.    Objective:     Vital Signs  Temp:  [97.2 °F (36.2 °C)-98.2 °F (36.8 °C)] 97.2 °F (36.2 °C)  Heart Rate:  [75-84] 76  Resp:  [14-22] 14  BP: (108-143)/(55-87) 108/87   Body mass index is 31.04 kg/m².    Physical Exam   General: Alert and oriented to self and place, no acute distress. Mentation at baseline as per family   Lungs: Clear to auscultation, nonlabored respiration.  Heart: RRR  Abdomen: Soft, nontender, nondistended, + bowel sounds.  Neuro: alert and awake, moving all 4 extremities        Scheduled Meds   aspirin, 325 mg, Oral, Daily   Or  aspirin, 300 mg, Rectal, Daily  atorvastatin, 80 mg, Oral, Nightly  [Held by provider] buPROPion XL, 150 mg, Oral, Daily  [Held by provider] busPIRone, 10 mg, Oral, TID  insulin lispro, 2-7 Units, Subcutaneous, 4x Daily With Meals & Nightly  senna-docusate sodium, 2 tablet, Oral, BID  [Held by provider] sertraline, 25 mg, Oral, Daily  sodium chloride, 10 mL, Intravenous, Q12H  sodium chloride, 10 mL, Intravenous, Q12H       PRN Meds     aluminum-magnesium hydroxide-simethicone    senna-docusate sodium **AND** polyethylene glycol **AND** bisacodyl **AND** bisacodyl    Calcium Replacement - Follow Nurse / BPA Driven Protocol    dextrose    dextrose    glucagon (human recombinant)    hydrALAZINE    Magnesium Low Dose Replacement - Follow Nurse / BPA Driven Protocol    nitroglycerin    Phosphorus Replacement - Follow Nurse / BPA Driven Protocol    Potassium Replacement - Follow Nurse / BPA Driven Protocol    prochlorperazine    sodium chloride    sodium chloride     sodium chloride    sodium chloride    sodium chloride    [Held by provider] traMADol   Infusions         Diagnostic Data    Results from last 7 days   Lab Units 03/16/25  0253   WBC 10*3/mm3 10.67   HEMOGLOBIN g/dL 13.2   HEMATOCRIT % 42.1   PLATELETS 10*3/mm3 204   GLUCOSE mg/dL 104*   CREATININE mg/dL 1.24*   BUN mg/dL 23   SODIUM mmol/L 144   POTASSIUM mmol/L 3.1*   AST (SGOT) U/L 34*   ALT (SGPT) U/L 14   ALK PHOS U/L 60   BILIRUBIN mg/dL 0.8   ANION GAP mmol/L 13.2       No radiology results for the last day      Interval results reviewed.    Assessment/Plan:   Acute ischemic stroke status post tenecteplase on 3/11/2025  HF reduced ejection fraction.  Diabetes  SONYA on CKD 3  GERD  Elevated anion gap    Neurology following the patient.  Recommendations noted.  Continue aspirin statins  2D echo on 3/12/2025 showed ejection fraction of 36 to 40% with hypokinesis  Loop recorder in Place  Cardiology recommendations appreciated- family declined agressive measures, unable to optimize GDMT because of borderline BP  Monitor renal function and avoid nephrotoxic medications and NSAIDs.  Treatment plan discussed with RN.   , psych meds on hold  Daily EKG    VTE Prophylaxis:  Mechanical VTE prophylaxis orders are present.         Code status is   Code Status and Medical Interventions: No CPR (Do Not Attempt to Resuscitate); Limited Support; No intubation (DNI)   Ordered at: 03/11/25 2230     Code Status (Patient has no pulse and is not breathing):    No CPR (Do Not Attempt to Resuscitate)     Medical Interventions (Patient has pulse or is breathing):    Limited Support     Medical Intervention Limits:    No intubation (DNI)     Level Of Support Discussed With:    Health Care Surrogate       Plan for disposition:     Barriers to Discharge:safe dispo  Anticipated Date of Discharge: 3/17/2025  Place of Discharge: Skilled nursing facility      Time: 40 minutes     Signature: Electronically signed by Valentin Hays  MD Indra, 03/16/25, 12:16 EDT.  [facility] Hospitalist Team

## 2025-03-16 NOTE — PLAN OF CARE
Problem: Skin Injury Risk Increased  Goal: Skin Health and Integrity  Outcome: Progressing  Intervention: Optimize Skin Protection  Recent Flowsheet Documentation  Taken 3/16/2025 0400 by Quynh Sommer RN  Activity Management: bedrest  Pressure Reduction Techniques: weight shift assistance provided  Head of Bed (HOB) Positioning: [facility] elevated  Pressure Reduction Devices: pressure-redistributing mattress utilized  Skin Protection: incontinence pads utilized  Taken 3/16/2025 0000 by Quynh Sommer RN  Activity Management: bedrest  Pressure Reduction Techniques: weight shift assistance provided  Head of Bed (HOB) Positioning: HOB elevated  Pressure Reduction Devices: pressure-redistributing mattress utilized  Skin Protection: incontinence pads utilized  Taken 3/15/2025 2000 by Quynh Sommer RN  Activity Management: bedrest  Pressure Reduction Techniques: weight shift assistance provided  Head of Bed (HOB) Positioning: [facility] elevated  Pressure Reduction Devices: pressure-redistributing mattress utilized  Skin Protection: incontinence pads utilized     Problem: Fall Injury Risk  Goal: Absence of Fall and Fall-Related Injury  Outcome: Progressing  Intervention: Identify and Manage Contributors  Recent Flowsheet Documentation  Taken 3/16/2025 0400 by Quynh Sommer RN  Medication Review/Management: medications reviewed  Taken 3/16/2025 0200 by Quynh Sommer RN  Medication Review/Management: medications reviewed  Taken 3/16/2025 0000 by Quynh Sommer RN  Medication Review/Management: medications reviewed  Taken 3/15/2025 2200 by Quynh Sommer RN  Medication Review/Management: medications reviewed  Taken 3/15/2025 2000 by Quynh Sommer RN  Medication Review/Management: medications reviewed  Intervention: Promote Injury-Free Environment  Recent Flowsheet Documentation  Taken 3/16/2025 0400 by Quynh Sommer RN  Safety Promotion/Fall Prevention:   activity supervised   assistive device/personal items  within reach   clutter free environment maintained   fall prevention program maintained   lighting adjusted   nonskid shoes/slippers when out of bed   room organization consistent   safety round/check completed  Taken 3/16/2025 0200 by Quynh Sommer RN  Safety Promotion/Fall Prevention:   activity supervised   assistive device/personal items within reach   clutter free environment maintained   fall prevention program maintained   lighting adjusted   nonskid shoes/slippers when out of bed   safety round/check completed   room organization consistent  Taken 3/16/2025 0000 by Quynh Sommer RN  Safety Promotion/Fall Prevention:   activity supervised   assistive device/personal items within reach   clutter free environment maintained   lighting adjusted   nonskid shoes/slippers when out of bed   room organization consistent   safety round/check completed  Taken 3/15/2025 2200 by Quynh Sommer, RN  Safety Promotion/Fall Prevention:   activity supervised   assistive device/personal items within reach   clutter free environment maintained   fall prevention program maintained   lighting adjusted   nonskid shoes/slippers when out of bed   safety round/check completed   room organization consistent  Taken 3/15/2025 2000 by Quynh Sommer, RN  Safety Promotion/Fall Prevention:   activity supervised   assistive device/personal items within reach   clutter free environment maintained   fall prevention program maintained   lighting adjusted   nonskid shoes/slippers when out of bed   room organization consistent   safety round/check completed   Goal Outcome Evaluation:

## 2025-03-16 NOTE — NURSING NOTE
Glucose 69, apple juice given to pt.  Glucose recheck was 94.  Patient will also be eating dinner.

## 2025-03-17 LAB
ALBUMIN SERPL-MCNC: 3.5 G/DL (ref 3.5–5.2)
ALBUMIN SERPL-MCNC: 3.6 G/DL (ref 3.5–5.2)
ALBUMIN/GLOB SERPL: 1.1 G/DL
ALBUMIN/GLOB SERPL: 1.2 G/DL
ALP SERPL-CCNC: 63 U/L (ref 39–117)
ALP SERPL-CCNC: 66 U/L (ref 39–117)
ALT SERPL W P-5'-P-CCNC: 14 U/L (ref 1–33)
ALT SERPL W P-5'-P-CCNC: 16 U/L (ref 1–33)
ANION GAP SERPL CALCULATED.3IONS-SCNC: 11.1 MMOL/L (ref 5–15)
ANION GAP SERPL CALCULATED.3IONS-SCNC: 12.1 MMOL/L (ref 5–15)
AST SERPL-CCNC: 33 U/L (ref 1–32)
AST SERPL-CCNC: 36 U/L (ref 1–32)
BASOPHILS # BLD AUTO: 0.03 10*3/MM3 (ref 0–0.2)
BASOPHILS # BLD AUTO: 0.03 10*3/MM3 (ref 0–0.2)
BASOPHILS NFR BLD AUTO: 0.2 % (ref 0–1.5)
BASOPHILS NFR BLD AUTO: 0.3 % (ref 0–1.5)
BILIRUB SERPL-MCNC: 0.5 MG/DL (ref 0–1.2)
BILIRUB SERPL-MCNC: 0.6 MG/DL (ref 0–1.2)
BUN SERPL-MCNC: 18 MG/DL (ref 8–23)
BUN SERPL-MCNC: 21 MG/DL (ref 8–23)
BUN/CREAT SERPL: 15.4 (ref 7–25)
BUN/CREAT SERPL: 17.2 (ref 7–25)
CALCIUM SPEC-SCNC: 8.7 MG/DL (ref 8.6–10.5)
CALCIUM SPEC-SCNC: 8.7 MG/DL (ref 8.6–10.5)
CHLORIDE SERPL-SCNC: 107 MMOL/L (ref 98–107)
CHLORIDE SERPL-SCNC: 109 MMOL/L (ref 98–107)
CO2 SERPL-SCNC: 20.9 MMOL/L (ref 22–29)
CO2 SERPL-SCNC: 21.9 MMOL/L (ref 22–29)
CREAT SERPL-MCNC: 1.17 MG/DL (ref 0.57–1)
CREAT SERPL-MCNC: 1.22 MG/DL (ref 0.57–1)
DEPRECATED RDW RBC AUTO: 45.2 FL (ref 37–54)
DEPRECATED RDW RBC AUTO: 45.2 FL (ref 37–54)
EGFRCR SERPLBLD CKD-EPI 2021: 43 ML/MIN/1.73
EGFRCR SERPLBLD CKD-EPI 2021: 45.3 ML/MIN/1.73
EOSINOPHIL # BLD AUTO: 0.4 10*3/MM3 (ref 0–0.4)
EOSINOPHIL # BLD AUTO: 0.4 10*3/MM3 (ref 0–0.4)
EOSINOPHIL NFR BLD AUTO: 3.1 % (ref 0.3–6.2)
EOSINOPHIL NFR BLD AUTO: 3.5 % (ref 0.3–6.2)
ERYTHROCYTE [DISTWIDTH] IN BLOOD BY AUTOMATED COUNT: 12.6 % (ref 12.3–15.4)
ERYTHROCYTE [DISTWIDTH] IN BLOOD BY AUTOMATED COUNT: 12.7 % (ref 12.3–15.4)
GLOBULIN UR ELPH-MCNC: 3.1 GM/DL
GLOBULIN UR ELPH-MCNC: 3.2 GM/DL
GLUCOSE BLDC GLUCOMTR-MCNC: 108 MG/DL (ref 70–105)
GLUCOSE BLDC GLUCOMTR-MCNC: 111 MG/DL (ref 70–105)
GLUCOSE BLDC GLUCOMTR-MCNC: 117 MG/DL (ref 70–105)
GLUCOSE BLDC GLUCOMTR-MCNC: 118 MG/DL (ref 70–105)
GLUCOSE SERPL-MCNC: 115 MG/DL (ref 65–99)
GLUCOSE SERPL-MCNC: 96 MG/DL (ref 65–99)
HCT VFR BLD AUTO: 39.1 % (ref 34–46.6)
HCT VFR BLD AUTO: 41.5 % (ref 34–46.6)
HGB BLD-MCNC: 12.5 G/DL (ref 12–15.9)
HGB BLD-MCNC: 13.2 G/DL (ref 12–15.9)
IMM GRANULOCYTES # BLD AUTO: 0.06 10*3/MM3 (ref 0–0.05)
IMM GRANULOCYTES # BLD AUTO: 0.06 10*3/MM3 (ref 0–0.05)
IMM GRANULOCYTES NFR BLD AUTO: 0.5 % (ref 0–0.5)
IMM GRANULOCYTES NFR BLD AUTO: 0.5 % (ref 0–0.5)
LYMPHOCYTES # BLD AUTO: 2.58 10*3/MM3 (ref 0.7–3.1)
LYMPHOCYTES # BLD AUTO: 3.07 10*3/MM3 (ref 0.7–3.1)
LYMPHOCYTES NFR BLD AUTO: 22.3 % (ref 19.6–45.3)
LYMPHOCYTES NFR BLD AUTO: 24 % (ref 19.6–45.3)
MAGNESIUM SERPL-MCNC: 1.8 MG/DL (ref 1.6–2.4)
MAGNESIUM SERPL-MCNC: 1.9 MG/DL (ref 1.6–2.4)
MCH RBC QN AUTO: 30.6 PG (ref 26.6–33)
MCH RBC QN AUTO: 31.1 PG (ref 26.6–33)
MCHC RBC AUTO-ENTMCNC: 31.8 G/DL (ref 31.5–35.7)
MCHC RBC AUTO-ENTMCNC: 32 G/DL (ref 31.5–35.7)
MCV RBC AUTO: 95.8 FL (ref 79–97)
MCV RBC AUTO: 97.9 FL (ref 79–97)
MONOCYTES # BLD AUTO: 0.88 10*3/MM3 (ref 0.1–0.9)
MONOCYTES # BLD AUTO: 1.01 10*3/MM3 (ref 0.1–0.9)
MONOCYTES NFR BLD AUTO: 7.6 % (ref 5–12)
MONOCYTES NFR BLD AUTO: 7.9 % (ref 5–12)
NEUTROPHILS NFR BLD AUTO: 64.3 % (ref 42.7–76)
NEUTROPHILS NFR BLD AUTO: 65.8 % (ref 42.7–76)
NEUTROPHILS NFR BLD AUTO: 7.62 10*3/MM3 (ref 1.7–7)
NEUTROPHILS NFR BLD AUTO: 8.2 10*3/MM3 (ref 1.7–7)
NRBC BLD AUTO-RTO: 0 /100 WBC (ref 0–0.2)
NRBC BLD AUTO-RTO: 0 /100 WBC (ref 0–0.2)
PHOSPHATE SERPL-MCNC: 2.4 MG/DL (ref 2.5–4.5)
PHOSPHATE SERPL-MCNC: 2.6 MG/DL (ref 2.5–4.5)
PLATELET # BLD AUTO: 221 10*3/MM3 (ref 140–450)
PLATELET # BLD AUTO: 237 10*3/MM3 (ref 140–450)
PMV BLD AUTO: 10.5 FL (ref 6–12)
PMV BLD AUTO: 10.9 FL (ref 6–12)
POTASSIUM SERPL-SCNC: 4.2 MMOL/L (ref 3.5–5.2)
POTASSIUM SERPL-SCNC: 4.4 MMOL/L (ref 3.5–5.2)
PROT SERPL-MCNC: 6.7 G/DL (ref 6–8.5)
PROT SERPL-MCNC: 6.7 G/DL (ref 6–8.5)
QT INTERVAL: 440 MS
QTC INTERVAL: 491 MS
RBC # BLD AUTO: 4.08 10*6/MM3 (ref 3.77–5.28)
RBC # BLD AUTO: 4.24 10*6/MM3 (ref 3.77–5.28)
SODIUM SERPL-SCNC: 140 MMOL/L (ref 136–145)
SODIUM SERPL-SCNC: 142 MMOL/L (ref 136–145)
WBC NRBC COR # BLD AUTO: 11.57 10*3/MM3 (ref 3.4–10.8)
WBC NRBC COR # BLD AUTO: 12.77 10*3/MM3 (ref 3.4–10.8)

## 2025-03-17 PROCEDURE — 82948 REAGENT STRIP/BLOOD GLUCOSE: CPT

## 2025-03-17 PROCEDURE — 80053 COMPREHEN METABOLIC PANEL: CPT

## 2025-03-17 PROCEDURE — 93010 ELECTROCARDIOGRAM REPORT: CPT | Performed by: INTERNAL MEDICINE

## 2025-03-17 PROCEDURE — 85025 COMPLETE CBC W/AUTO DIFF WBC: CPT

## 2025-03-17 PROCEDURE — 93005 ELECTROCARDIOGRAM TRACING: CPT | Performed by: STUDENT IN AN ORGANIZED HEALTH CARE EDUCATION/TRAINING PROGRAM

## 2025-03-17 PROCEDURE — 84100 ASSAY OF PHOSPHORUS: CPT

## 2025-03-17 PROCEDURE — 83735 ASSAY OF MAGNESIUM: CPT

## 2025-03-17 PROCEDURE — 99232 SBSQ HOSP IP/OBS MODERATE 35: CPT | Performed by: INTERNAL MEDICINE

## 2025-03-17 RX ADMIN — SENNOSIDES AND DOCUSATE SODIUM 2 TABLET: 50; 8.6 TABLET ORAL at 21:11

## 2025-03-17 RX ADMIN — Medication 10 ML: at 08:13

## 2025-03-17 RX ADMIN — ASPIRIN 325 MG ORAL TABLET 325 MG: 325 PILL ORAL at 08:11

## 2025-03-17 RX ADMIN — Medication 10 ML: at 21:11

## 2025-03-17 RX ADMIN — ATORVASTATIN CALCIUM 80 MG: 40 TABLET, FILM COATED ORAL at 21:11

## 2025-03-17 RX ADMIN — Medication 10 ML: at 21:12

## 2025-03-17 NOTE — PROGRESS NOTES
Cardiology Progress Note    Patient Identification:  Name: Lynda Patterson  Age: 85 y.o.  Sex: female  :  1940  MRN: [id number]                 Follow Up / Chief Complaint: CVA, abnormal echo and EKG  Chief Complaint   Patient presents with    Weakness - Generalized       Interval History: Patient presented with expressive aphasia and right hemiparesis diagnosed with acute CVA and given TNK-tPA.  Echo revealed LV dysfunction and EKG is revealing T wave inversion anterolaterally.  proBNP is elevated.       Subjective: Patient seen and examined.  Chart reviewed.  Labs reviewed.  Discussed with RN taking care of patient.  Patient is without chest pain.  Discussed with patient and family members by bedside.      Objective:3/11/2025 - 3/14/2025: proBNP is elevated at 13,661.  Creatinine is elevated at 1.71 on admission and today is 1.41.  Hemoglobin A1c of 6.08.  Normal TSH, CBC  3/17/2025: BUN 18 creatinine 1.17 EGFR 45.3 AST 36 ALT normal WBC 12.77 otherwise CBC unremarkable        History of present illness:         Ms. Lynda Patterson has PMH of     CAD, details of which are not available--Lexiscan Cardiolite 2022 negative for ischemia EF of 77%  Recurrent syncope, Biotronik ILR 3/14/2023  Hypertension  Dyslipidemia  Type 2 diabetes  CKD  Cholecystectomy, hysterectomy  Non-smoker  Allergies/intolerance to Cipro, penicillin, sulfa  Positive family history of premature CAD in patient's son     Presented through emergency room 3/11/2025 with new onset expressive aphasia and hemiparesis on right side.  Code stroke was called and patient received TNK tPA.  Has partial resolution of symptoms.  Workup revealed LV dysfunction therefore cardiology is consulted.  Patient is more awake denies any chest pain or shortness of breath.     Data: Labs from 3/11/2025 - 3/14/2025: proBNP is elevated at 13,661.  Creatinine is elevated at 1.71 on admission and today is 1.41.  Hemoglobin A1c of 6.08.  Normal  TSH, CBC  CT head 3/12/2025: No acute intracranial abnormality  MRI 3/11/2025 reveals atrophy and chronic microvascular ischemic changes no acute intracranial process.  EKG done 3/11/2025 reviewed/interpreted by me reveals sinus rhythm with rate of 84 bpm  Echocardiogram 3/12/2025 reviewed/interpreted by me reveals LV dysfunction with EF of 36 to 40% with regional wall motion abnormality.  Grade 1 diastolic dysfunction           Assessment:  :     Acute CVA  New onset LV dysfunction, with regional wall motion abnormalities consistent with ischemic cardiomyopathy.  Bradycardia  Dyspnea on exertion  CAD  Hypertension  Dyslipidemia  Diabetes  CKD Dr. Reid  Obesity with BMI over 30  Positive family history of premature CAD     Recommendations / Plan:        Patient presented with acute CVA GERD TNK-tPA.  EKG reviewed/interpreted by me reveals T wave versions in anterior lateral leads.  Patient had an echocardiogram which is showing regional wall motion abnormalities and LV dysfunction.  Discussed with patient and family numbers.  Do not want any invasive or aggressive therapies including catheter LEEROY.  Will continue medical management with aspirin, atorvastatin    Patient's arterial blood pressure is marginal with yesterday being as low as 98/53.  Today is 113/65.  Patient is not a candidate for guideline directed medical therapy either with beta-blockers, ACE inhibitors, Arni, Aldactone at the current time due to low blood pressure.  Will continue to monitor rhythm.  Elevated is revealing sinus rhythm.  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    Macular degeneration     Mild cognitive impairment of uncertain or unknown etiology     Muscle weakness (generalized)     Nondisplaced fracture of proximal end of left fibula 09/03/2021    Reduced mobility     Repeated falls     Sprain of left ankle, initial encounter 08/20/2021    Syncope     Urinary tract infection     Vitamin D deficiency      Past Surgical History:  Past Surgical History:   Procedure Laterality Date    CHOLECYSTECTOMY  1988    TOTAL ABDOMINAL HYSTERECTOMY  1970        Social History:   Social History     Tobacco Use    Smoking status: Never     Passive exposure: Never    Smokeless tobacco: Never   Substance Use Topics    Alcohol use: No      Family History:  Family History   Problem Relation Age of Onset    Stroke Mother     Stroke Father     Heart disease Brother     Heart attack Son     Heart disease Son           Allergies:  Allergies   Allergen Reactions    Ciprofloxacin Nausea And Vomiting    Penicillin G Nausea And Vomiting    Sulfa Antibiotics Nausea And Vomiting     Scheduled Meds:  aspirin, 325 mg, Daily   Or  aspirin, 300 mg, Daily  atorvastatin, 80 mg, Nightly  [Held by provider] buPROPion XL, 150 mg, Daily  [Held by provider] busPIRone, 10 mg, TID  insulin lispro, 2-7 Units, 4x Daily With Meals & Nightly  senna-docusate sodium, 2 tablet, BID  [Held by provider] sertraline, 25 mg, Daily  sodium chloride, 10 mL, Q12H  sodium chloride, 10 mL, Q12H          Review of Systems:   ROS  Review of Systems   Constitution: Negative for chills and fever.   Cardiovascular: Negative for chest pain and palpitations.   Respiratory: Negative for cough and hemoptysis.    Gastrointestinal: Negative for nausea.        Constitutional:  Temp:  [98 °F (36.7 °C)-98.6 °F (37 °C)] 98.1 °F (36.7 °C)  Heart Rate:  [77-83] 83  Resp:  [16-21] 18  BP: ()/(53-75) 113/65    Physical Exam   BP  "113/65 (BP Location: Right arm, Patient Position: Lying)   Pulse 83   Temp 98.1 °F (36.7 °C) (Oral)   Resp 18   Ht 149.9 cm (59\")   Wt 69.7 kg (153 lb 10.6 oz)   SpO2 96%   BMI 31.04 kg/m²   General:  Appears in no acute distress  Eyes: Sclera is anicteric,  conjunctiva is clear   HEENT:  No JVD. Thyroid not visibly enlarged. No mucosal pallor or cyanosis  Respiratory: Respirations regular and unlabored at rest.  Clear to auscultation  Cardiovascular: S1,S2 Regular rate and rhythm.  Gastrointestinal: Abdomen nondistended.  Musculoskeletal:  No abnormal movements  Extremities: No digital clubbing or cyanosis  Skin: Color pink.   Neuro: Alert and awake.    INTAKE AND OUTPUT:    Intake/Output Summary (Last 24 hours) at 3/17/2025 1652  Last data filed at 3/17/2025 1200  Gross per 24 hour   Intake 320 ml   Output 1050 ml   Net -730 ml       Cardiographics  Telemetry: Reviewed and interpreted by me reveals sinus rhythm    ECG:   ECG 12 Lead QT Measurement   Final Result   HEART RATE=75  bpm   RR Zedwlczs=842  ms   MI Qzgckuln=993  ms   P Horizontal Axis=-11  deg   P Front Axis=10  deg   QRSD Yoysmoyy=592  ms   QT Jqjjjufo=495  ms   DPzC=408  ms   QRS Axis=258  deg   T Wave Axis=205  deg   - ABNORMAL ECG -   Sinus rhythm   Atrial premature complexes   Inferior  infarct, old   Abnrm T, consider ischemia, anterolateral lds   When compared with ECG of 16-Mar-2025 09:53:00,   New or worsened ischemia or infarction   Significant repolarization change   Electronically Signed By: Benjamin Chinchilla ([facility]) 2025-03-17 15:24:46   Date and Time of Study:2025-03-17 12:36:24      ECG 12 Lead QT Measurement   Preliminary Result   HEART RATE=76  bpm   RR Swocgqcr=615  ms   MI Hhxbaucc=763  ms   P Horizontal Axis=-5  deg   P Front Axis=35  deg   QRSD Zhimokzg=105  ms   QT Zfypxjvg=832  ms   JUeO=500  ms   QRS Axis=210  deg   T Wave Axis=97  deg   - ABNORMAL ECG -   Sinus arrhythmia   Atrial premature complex   Markedly posterior QRS axis "   Nonspecific T abnrm, anterolateral leads   ST elevation, consider inferior injury   Prolonged QT interval   Date and Time of Study:2025-03-16 09:53:00      ECG 12 Lead Stroke Evaluation   Final Result   HEART RATE=84  bpm   RR Gdzrarlk=291  ms   IN Kjzojdgd=743  ms   P Horizontal Axis=-15  deg   P Front Axis=39  deg   QRSD Jwbinzmo=113  ms   QT Mgsxfcvj=351  ms   ANbE=913  ms   QRS Axis=137  deg   T Wave Axis=235  deg   - ABNORMAL ECG -   Sinus rhythm   Low voltage, extremity leads   Abnormal T, consider ischemia, diffuse leads   Prolonged QT interval   No previous ECG available for comparison   Electronically Signed By: Vincent Sanchez ([facility]) 2025-03-12 06:10:21   Date and Time of Study:2025-03-11 16:14:14      Telemetry Scan   Final Result      Telemetry Scan   Final Result      Telemetry Scan   Final Result      Telemetry Scan   Final Result      Telemetry Scan   Final Result      Telemetry Scan   Final Result      Telemetry Scan   Final Result      Telemetry Scan   Final Result      Telemetry Scan   Final Result      Telemetry Scan   Final Result      Telemetry Scan   Final Result      Telemetry Scan   Final Result      Telemetry Scan   Final Result      Telemetry Scan   Final Result      Telemetry Scan   Final Result      Telemetry Scan   Final Result      Telemetry Scan   Final Result      Telemetry Scan   Final Result      Telemetry Scan   Final Result      Telemetry Scan   Final Result      Telemetry Scan   Final Result      Telemetry Scan   Final Result      Telemetry Scan   Final Result      Telemetry Scan   Final Result        I have personally reviewed EKG    Echocardiogram: Results for orders placed during the hospital encounter of 03/11/25    Adult Transthoracic Echo Complete W/ Cont if Necessary Per Protocol    Interpretation Summary    Left ventricular systolic function is moderately decreased. Left ventricular ejection fraction appears to be 36 - 40%.    The following left ventricular wall segments  are hypokinetic: mid anterior, mid anterolateral, mid inferolateral, mid inferior, mid inferoseptal and mid anteroseptal. The following left ventricular wall segments are akinetic: apical anterior, apical lateral, apical inferior, apical septal and apex.    Left ventricular diastolic function is consistent with (grade Ia w/high LAP) impaired relaxation.    Estimated right ventricular systolic pressure from tricuspid regurgitation is normal (<35 mmHg).      STRESS TEST  Results for orders placed during the hospital encounter of 22    Stress Test With Myocardial Perfusion One Day    Interpretation Summary  LEXISCAN CARDIOLITE REPORT    DATE OF PROCEDURE: 2022    INDICATION FOR PROCEDURE: Recurrent syncope, dyspnea on exertion, bradycardia, hypertension, and diabetes, dyslipidemia, CKD    PROCEDURE PERFORMED: Lexiscan Cardiolite    PROCEDURE COMMENTS:    After informed consent was obtained.  Stress test was supervised by nurse practitioner. Patient's resting heart rate was 53 bpm, resting blood pressure was 134/84, resting EKG revealed sinus bradycardia at the rate of 53 bpm with poor R wave progression.  Patient was given 0.4 mg of regadenosine for stress testing.  There was no significant change in heart rate, blood pressure, symptoms with regadenoson injection.  Patient tolerated procedure well.  Complications were none.    NUCLEAR IMAGIN.  There was   uniform uptake of Cardiolite both in resting and post stress images, no ischemia seen.  2.  Gated images reveal  normal LV size and contractility, LVEF of 77%.    CONCLUSION:  1.  Lexiscan Cardiolite with normal perfusion, negative for ischemia.  2.  Normal wall motion.  LVEF of 77%.    RECOMMENDATIONS:    Clinical correlation recommended.      Deric Johnson MD  22  19:51 EDT      HEART CATHETERIZATION  No results found for this or any previous visit.      Lab Review   I have reviewed the labs      Results from last 7 days   Lab  "Units 03/17/25  0216   MAGNESIUM mg/dL 1.8     Results from last 7 days   Lab Units 03/17/25  0216   SODIUM mmol/L 142   POTASSIUM mmol/L 4.2   BUN mg/dL 18   CREATININE mg/dL 1.17*   CALCIUM mg/dL 8.7         Results from last 7 days   Lab Units 03/17/25  0216 03/16/25  0253 03/14/25  2203   WBC 10*3/mm3 12.77* 10.67 7.93   HEMOGLOBIN g/dL 12.5 13.2 13.1   HEMATOCRIT % 39.1 42.1 41.2   PLATELETS 10*3/mm3 221 204 174     Results from last 7 days   Lab Units 03/11/25  1522   INR  1.16*   APTT seconds 29.1       RADIOLOGY:  Imaging Results (Last 24 Hours)       ** No results found for the last 24 hours. **                  )3/17/2025  MD JOSE ALEJANDRO Juan/Transcription:   \"Dictated utilizing Dragon dictation\".   "

## 2025-03-17 NOTE — PROGRESS NOTES
[facility] MEDICINE SERVICE  DAILY PROGRESS NOTE    NAME: Lynda Patterson  : 1940  MRN: [id number]      LOS: 6 days     PROVIDER OF SERVICE: Valentin Burrell MD    Chief Complaint: CVA (cerebral vascular accident)    Subjective:   Denies any new complaints. Feels better this am.   Son and DIL at bed side.       Review of Systems:   All 21 ROS were negative except mentioned above.    Objective:     Vital Signs  Temp:  [97.2 °F (36.2 °C)-98.6 °F (37 °C)] 98 °F (36.7 °C)  Heart Rate:  [76-81] 77  Resp:  [14-21] 16  BP: ()/(53-87) 131/68   Body mass index is 31.04 kg/m².    Physical Exam   General: Alert and oriented to self and place, no acute distress. Mentation at baseline as per family   Lungs: Clear to auscultation, nonlabored respiration.  Heart: RRR  Abdomen: Soft, nontender, nondistended, + bowel sounds.  Neuro: alert and awake, moving all 4 extremities        Scheduled Meds   aspirin, 325 mg, Oral, Daily   Or  aspirin, 300 mg, Rectal, Daily  atorvastatin, 80 mg, Oral, Nightly  [Held by provider] buPROPion XL, 150 mg, Oral, Daily  [Held by provider] busPIRone, 10 mg, Oral, TID  insulin lispro, 2-7 Units, Subcutaneous, 4x Daily With Meals & Nightly  senna-docusate sodium, 2 tablet, Oral, BID  [Held by provider] sertraline, 25 mg, Oral, Daily  sodium chloride, 10 mL, Intravenous, Q12H  sodium chloride, 10 mL, Intravenous, Q12H       PRN Meds     aluminum-magnesium hydroxide-simethicone    senna-docusate sodium **AND** polyethylene glycol **AND** bisacodyl **AND** bisacodyl    Calcium Replacement - Follow Nurse / BPA Driven Protocol    dextrose    dextrose    glucagon (human recombinant)    hydrALAZINE    Magnesium Low Dose Replacement - Follow Nurse / BPA Driven Protocol    nitroglycerin    Phosphorus Replacement - Follow Nurse / BPA Driven Protocol    Potassium Replacement - Follow Nurse / BPA Driven Protocol    prochlorperazine    sodium chloride    sodium chloride    sodium  chloride    sodium chloride    sodium chloride    [Held by provider] traMADol   Infusions         Diagnostic Data    Results from last 7 days   Lab Units 03/17/25  0216   WBC 10*3/mm3 12.77*   HEMOGLOBIN g/dL 12.5   HEMATOCRIT % 39.1   PLATELETS 10*3/mm3 221   GLUCOSE mg/dL 96   CREATININE mg/dL 1.17*   BUN mg/dL 18   SODIUM mmol/L 142   POTASSIUM mmol/L 4.2   AST (SGOT) U/L 36*   ALT (SGPT) U/L 16   ALK PHOS U/L 63   BILIRUBIN mg/dL 0.6   ANION GAP mmol/L 11.1       No radiology results for the last day      Interval results reviewed.    Assessment/Plan:   Acute ischemic stroke status post tenecteplase on 3/11/2025  HF reduced ejection fraction.  Diabetes  SONYA on CKD 3  GERD  Elevated anion gap    Neurology following the patient.  Recommendations noted.  Continue aspirin statins  2D echo on 3/12/2025 showed ejection fraction of 36 to 40% with hypokinesis  Loop recorder in Place  Cardiology recommendations appreciated- family declined agressive measures, unable to optimize GDMT because of borderline BP  Monitor renal function and avoid nephrotoxic medications and NSAIDs.  Treatment plan discussed with RN.   , psych meds on hold  Daily EKG    VTE Prophylaxis:  Mechanical VTE prophylaxis orders are present.         Code status is   Code Status and Medical Interventions: No CPR (Do Not Attempt to Resuscitate); Limited Support; No intubation (DNI)   Ordered at: 03/11/25 2230     Code Status (Patient has no pulse and is not breathing):    No CPR (Do Not Attempt to Resuscitate)     Medical Interventions (Patient has pulse or is breathing):    Limited Support     Medical Intervention Limits:    No intubation (DNI)     Level Of Support Discussed With:    Health Care Surrogate       Plan for disposition:     Barriers to Discharge:safe dispo, pending precert  Anticipated Date of Discharge: 3/18/2025  Place of Discharge: Skilled nursing facility      Time: 40 minutes     Signature: Electronically signed by Valentin  Saúl Burrell MD, 03/17/25, 11:33 EDT.  [facility] Hospitalist Team

## 2025-03-17 NOTE — PLAN OF CARE
Problem: Adult Inpatient Plan of Care  Goal: Absence of Hospital-Acquired Illness or Injury  Intervention: Identify and Manage Fall Risk  Recent Flowsheet Documentation  Taken 3/17/2025 0813 by Minnie Howard RN  Safety Promotion/Fall Prevention:   safety round/check completed   activity supervised   assistive device/personal items within reach   clutter free environment maintained   fall prevention program maintained   gait belt   nonskid shoes/slippers when out of bed  Intervention: Prevent Skin Injury  Recent Flowsheet Documentation  Taken 3/17/2025 0813 by Minnie Howard RN  Body Position:   turned   left  Skin Protection: incontinence pads utilized  Intervention: Prevent and Manage VTE (Venous Thromboembolism) Risk  Recent Flowsheet Documentation  Taken 3/17/2025 0813 by Minnie Howard RN  VTE Prevention/Management: patient refused intervention  Intervention: Prevent Infection  Recent Flowsheet Documentation  Taken 3/17/2025 0813 by Minnie Howard RN  Infection Prevention:   hand hygiene promoted   rest/sleep promoted  Goal: Optimal Comfort and Wellbeing  Intervention: Provide Person-Centered Care  Recent Flowsheet Documentation  Taken 3/17/2025 0813 by Minnie Howard RN  Trust Relationship/Rapport:   care explained   choices provided     Problem: Stroke, Ischemic (Includes Transient Ischemic Attack)  Goal: Optimal Coping  Intervention: Support Psychosocial Response to Stroke  Recent Flowsheet Documentation  Taken 3/17/2025 0813 by Minnie Howard RN  Supportive Measures: active listening utilized  Goal: Optimal Cerebral Tissue Perfusion  Intervention: Protect and Optimize Cerebral Perfusion  Recent Flowsheet Documentation  Taken 3/17/2025 0813 by Minnie Howard RN  Sensory Stimulation Regulation:   care clustered   quiet environment promoted  Cerebral Perfusion Promotion: blood pressure monitored  Goal: Optimal Cognitive Function  Intervention: Optimize Cognitive Function  Recent Flowsheet  Documentation  Taken 3/17/2025 0813 by Minnie Howard RN  Sensory Stimulation Regulation:   care clustered   quiet environment promoted  Reorientation Measures: clock in view  Goal: Improved Communication Skills  Intervention: Optimize Communication Skills  Recent Flowsheet Documentation  Taken 3/17/2025 0813 by Minnie Howard RN  Communication Enhancement Strategies: call light answered in person  Goal: Optimal Functional Ability  Intervention: Optimize Functional Ability  Recent Flowsheet Documentation  Taken 3/17/2025 0813 by Minnie Howard RN  Activity Management: bedrest  Goal: Effective Oxygenation and Ventilation  Intervention: Optimize Oxygenation and Ventilation  Recent Flowsheet Documentation  Taken 3/17/2025 0813 by Minnie Howard RN  Head of Bed (HOB) Positioning: HOB elevated  Goal: Improved Sensorimotor Function  Intervention: Optimize Range of Motion, Motor Control and Function  Recent Flowsheet Documentation  Taken 3/17/2025 0813 by Minnie Howard RN  Positioning/Transfer Devices:   pillows   in use  Range of Motion: active ROM (range of motion) encouraged  Intervention: Optimize Sensory and Perceptual Ability  Recent Flowsheet Documentation  Taken 3/17/2025 0813 by Minnie Howard RN  Pressure Reduction Techniques: weight shift assistance provided  Pressure Reduction Devices: pressure-redistributing mattress utilized     Problem: Skin Injury Risk Increased  Goal: Skin Health and Integrity  Intervention: Optimize Skin Protection  Recent Flowsheet Documentation  Taken 3/17/2025 0813 by Minnie Howard RN  Activity Management: bedrest  Pressure Reduction Techniques: weight shift assistance provided  Head of Bed (HOB) Positioning: HOB elevated  Pressure Reduction Devices: pressure-redistributing mattress utilized  Skin Protection: incontinence pads utilized   Goal Outcome Evaluation: Patient resting in bed. Denies needs at this time.

## 2025-03-18 LAB
GLUCOSE BLDC GLUCOMTR-MCNC: 100 MG/DL (ref 70–105)
GLUCOSE BLDC GLUCOMTR-MCNC: 110 MG/DL (ref 70–105)
GLUCOSE BLDC GLUCOMTR-MCNC: 114 MG/DL (ref 70–105)
GLUCOSE BLDC GLUCOMTR-MCNC: 119 MG/DL (ref 70–105)

## 2025-03-18 PROCEDURE — 99232 SBSQ HOSP IP/OBS MODERATE 35: CPT | Performed by: INTERNAL MEDICINE

## 2025-03-18 PROCEDURE — 82948 REAGENT STRIP/BLOOD GLUCOSE: CPT

## 2025-03-18 PROCEDURE — 82948 REAGENT STRIP/BLOOD GLUCOSE: CPT | Performed by: INTERNAL MEDICINE

## 2025-03-18 RX ADMIN — SENNOSIDES AND DOCUSATE SODIUM 2 TABLET: 50; 8.6 TABLET ORAL at 08:54

## 2025-03-18 RX ADMIN — ATORVASTATIN CALCIUM 80 MG: 40 TABLET, FILM COATED ORAL at 20:51

## 2025-03-18 RX ADMIN — ASPIRIN 325 MG ORAL TABLET 325 MG: 325 PILL ORAL at 08:54

## 2025-03-18 RX ADMIN — Medication 10 ML: at 08:54

## 2025-03-18 NOTE — PROGRESS NOTES
Cardiology Progress Note    Patient Identification:  Name: Lynda Patterson  Age: 85 y.o.  Sex: female  :  1940  MRN: [id number]                 Follow Up / Chief Complaint: CVA, abnormal echo and EKG  Chief Complaint   Patient presents with    Weakness - Generalized       Interval History: Patient presented with expressive aphasia and right hemiparesis diagnosed with acute CVA and given TNK-tPA.  Echo revealed LV dysfunction and EKG is revealing T wave inversion anterolaterally.  proBNP is elevated.      NP note: Patient seen with Dr. Johnson.  Patient sitting in bed no distress noted she denies any chest pain or shortness of breath.  Per chart review family declined aggressive treatment and cardiac workup will continue medical management  From cardiology standpoint patient can be discharged to skilled nursing facility at any time.  Continue aspirin statin if blood pressure improves consider adding low-dose beta-blocker.    Electronically signed by SHABANA Garcia, 25, 10:38 AM EDT.    Cardiology attending addendum :    I have personally performed a face-to-face diagnostic evaluation, physical exam and reviewed data on this patient.  I have reviewed documentation done by me and nurse practitioner  and corrected as needed.  And agree with the different components of documentation.Greater than 50% of the time spent in the care of this patient was provided by attending consultant/me.           Subjective: Patient seen and examined.  Chart reviewed.  Labs reviewed.  Discussed with RN taking care of patient.  Patient is without chest pain.        Objective:3/11/2025 - 3/14/2025: proBNP is elevated at 13,661.  Creatinine is elevated at 1.71 on admission and today is 1.41.  Hemoglobin A1c of 6.08.  Normal TSH, CBC  3/17/2025: BUN 18 creatinine 1.17 EGFR 45.3 AST 36 ALT normal WBC 12.77 otherwise CBC unremarkable  3/18/2025: BUN 21 creatinine 1.22 glucose 115 AST 33 WBC 11.57      History of  present illness:         Ms. Lynda Patterson has PMH of     CAD, details of which are not available--Lexiscan Cardiolite 8/12/2022 negative for ischemia EF of 77%  Recurrent syncope, Biotronik ILR 3/14/2023  Hypertension  Dyslipidemia  Type 2 diabetes  CKD  Cholecystectomy, hysterectomy  Non-smoker  Allergies/intolerance to Cipro, penicillin, sulfa  Positive family history of premature CAD in patient's son     Presented through emergency room 3/11/2025 with new onset expressive aphasia and hemiparesis on right side.  Code stroke was called and patient received TNK tPA.  Has partial resolution of symptoms.  Workup revealed LV dysfunction therefore cardiology is consulted.  Patient is more awake denies any chest pain or shortness of breath.     Data: Labs from 3/11/2025 - 3/14/2025: proBNP is elevated at 13,661.  Creatinine is elevated at 1.71 on admission and today is 1.41.  Hemoglobin A1c of 6.08.  Normal TSH, CBC  CT head 3/12/2025: No acute intracranial abnormality  MRI 3/11/2025 reveals atrophy and chronic microvascular ischemic changes no acute intracranial process.  EKG done 3/11/2025 reviewed/interpreted by me reveals sinus rhythm with rate of 84 bpm  Echocardiogram 3/12/2025 reviewed/interpreted by me reveals LV dysfunction with EF of 36 to 40% with regional wall motion abnormality.  Grade 1 diastolic dysfunction           Assessment:  :     Acute CVA  New onset LV dysfunction, with regional wall motion abnormalities consistent with ischemic cardiomyopathy.  Bradycardia  Dyspnea on exertion  CAD  Hypertension  Dyslipidemia  Diabetes  CKD Dr. Reid  Obesity with BMI over 30  Positive family history of premature CAD     Recommendations / Plan:        Patient presented with acute CVA received TNK-tPA.  EKG reviewed/interpreted by me reveals T wave versions in anterior lateral leads.  Patient had an echocardiogram which is showing regional wall motion abnormalities and LV dysfunction.  Discussed with  patient and family members.  Do not want any invasive or aggressive therapies including catheter LEEROY.  Will continue medical management with aspirin, atorvastatin  Patient blood pressure marginal to add any further guideline directed medical therapy either with beta-blockers, ACE inhibitors, Arni or Aldactone at the current time.  Telemetry is revealing sinus rhythm.  Continue medical management with aspirin and atorvastatin as tolerated.  Continue conservative care.            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    Macular degeneration     Mild cognitive impairment of uncertain or unknown etiology     Muscle weakness (generalized)     Nondisplaced fracture of proximal end of left fibula 09/03/2021    Reduced mobility     Repeated falls     Sprain of left ankle, initial encounter 08/20/2021    Syncope     Urinary tract infection     Vitamin D deficiency      Past Surgical History:  Past Surgical History:   Procedure Laterality Date    CHOLECYSTECTOMY  1988    TOTAL ABDOMINAL HYSTERECTOMY  1970        Social History:   Social History     Tobacco Use    Smoking status: Never     Passive exposure: Never    Smokeless tobacco: Never   Substance Use Topics    Alcohol use: No      Family History:  Family History   Problem Relation Age of Onset    Stroke Mother     Stroke Father     Heart disease Brother     Heart attack Son     Heart disease Son           Allergies:  Allergies   Allergen Reactions    Ciprofloxacin Nausea And Vomiting    Penicillin G Nausea And Vomiting  "   Sulfa Antibiotics Nausea And Vomiting     Scheduled Meds:  aspirin, 325 mg, Daily   Or  aspirin, 300 mg, Daily  atorvastatin, 80 mg, Nightly  [Held by provider] buPROPion XL, 150 mg, Daily  [Held by provider] busPIRone, 10 mg, TID  insulin lispro, 2-7 Units, 4x Daily With Meals & Nightly  senna-docusate sodium, 2 tablet, BID  [Held by provider] sertraline, 25 mg, Daily  sodium chloride, 10 mL, Q12H  sodium chloride, 10 mL, Q12H          Review of Systems:   ROS  Review of Systems   Constitution: Negative for chills and fever.   Cardiovascular: Negative for chest pain and palpitations.   Respiratory: Negative for cough and hemoptysis.    Gastrointestinal: Negative for nausea.        Constitutional:  Temp:  [97.4 °F (36.3 °C)-98.3 °F (36.8 °C)] 97.9 °F (36.6 °C)  Heart Rate:  [73-83] 73  Resp:  [14-19] 16  BP: (109-130)/(52-65) 114/60    Physical Exam   /60 (BP Location: Right arm, Patient Position: Lying)   Pulse 73   Temp 97.9 °F (36.6 °C) (Axillary)   Resp 16   Ht 149.9 cm (59\")   Wt 69.7 kg (153 lb 10.6 oz)   SpO2 96%   BMI 31.04 kg/m²   General:  Appears in no acute distress appears very frail  Eyes: Sclera is anicteric,  conjunctiva is clear   HEENT:  No JVD. Thyroid not visibly enlarged. No mucosal pallor or cyanosis  Respiratory: Respirations regular and unlabored at rest.  Clear to auscultation  Cardiovascular: S1,S2 Regular rate and rhythm.  Gastrointestinal: Abdomen nondistended.  Musculoskeletal:  No abnormal movements  Extremities: No digital clubbing or cyanosis  Skin: Color pink.   Neuro: Alert and awake.    INTAKE AND OUTPUT:    Intake/Output Summary (Last 24 hours) at 3/18/2025 1325  Last data filed at 3/18/2025 0800  Gross per 24 hour   Intake 480 ml   Output 500 ml   Net -20 ml       Cardiographics  Telemetry: Reviewed and interpreted by me reveals sinus rhythm    ECG:   ECG 12 Lead QT Measurement   Final Result   HEART RATE=75  bpm   RR Sfngupgr=205  ms   GA Djzevvdv=764  ms   P " Horizontal Axis=-11  deg   P Front Axis=10  deg   QRSD Asmrjkic=724  ms   QT Udsrntgp=658  ms   FPbK=195  ms   QRS Axis=258  deg   T Wave Axis=205  deg   - ABNORMAL ECG -   Sinus rhythm   Atrial premature complexes   Inferior  infarct, old   Abnrm T, consider ischemia, anterolateral lds   When compared with ECG of 16-Mar-2025 09:53:00,   New or worsened ischemia or infarction   Significant repolarization change   Electronically Signed By: Benjamin Chinchilla (LAURY) 2025-03-17 15:24:46   Date and Time of Study:2025-03-17 12:36:24      ECG 12 Lead QT Measurement   Preliminary Result   HEART RATE=76  bpm   RR Qektifxr=340  ms   MN Qxfbtlcd=540  ms   P Horizontal Axis=-5  deg   P Front Axis=35  deg   QRSD Kqczkhde=977  ms   QT Mkicnnro=057  ms   QYpP=218  ms   QRS Axis=210  deg   T Wave Axis=97  deg   - ABNORMAL ECG -   Sinus arrhythmia   Atrial premature complex   Markedly posterior QRS axis   Nonspecific T abnrm, anterolateral leads   ST elevation, consider inferior injury   Prolonged QT interval   Date and Time of Study:2025-03-16 09:53:00      ECG 12 Lead Stroke Evaluation   Final Result   HEART RATE=84  bpm   RR Deicciwd=920  ms   MN Rmlvkphv=482  ms   P Horizontal Axis=-15  deg   P Front Axis=39  deg   QRSD Kkkiuaxn=922  ms   QT Rugqkfxi=534  ms   NGhK=248  ms   QRS Axis=137  deg   T Wave Axis=235  deg   - ABNORMAL ECG -   Sinus rhythm   Low voltage, extremity leads   Abnormal T, consider ischemia, diffuse leads   Prolonged QT interval   No previous ECG available for comparison   Electronically Signed By: Vincent Sanchez (LAURY) 2025-03-12 06:10:21   Date and Time of Study:2025-03-11 16:14:14      Telemetry Scan   Final Result      Telemetry Scan   Final Result      Telemetry Scan   Final Result      Telemetry Scan   Final Result      Telemetry Scan   Final Result      Telemetry Scan   Final Result      Telemetry Scan   Final Result      Telemetry Scan   Final Result      Telemetry Scan   Final Result      Telemetry Scan    Final Result      Telemetry Scan   Final Result      Telemetry Scan   Final Result      Telemetry Scan   Final Result      Telemetry Scan   Final Result      Telemetry Scan   Final Result      Telemetry Scan   Final Result      Telemetry Scan   Final Result      Telemetry Scan   Final Result      Telemetry Scan   Final Result      Telemetry Scan   Final Result      Telemetry Scan   Final Result      Telemetry Scan   Final Result      Telemetry Scan   Final Result      Telemetry Scan   Final Result      Telemetry Scan   Final Result      Telemetry Scan   Final Result      Telemetry Scan   Final Result      Telemetry Scan   Final Result      Telemetry Scan   Final Result      Telemetry Scan   Final Result      Telemetry Scan   Final Result      Telemetry Scan   Final Result        I have personally reviewed EKG    Echocardiogram: Results for orders placed during the hospital encounter of 03/11/25    Adult Transthoracic Echo Complete W/ Cont if Necessary Per Protocol    Interpretation Summary    Left ventricular systolic function is moderately decreased. Left ventricular ejection fraction appears to be 36 - 40%.    The following left ventricular wall segments are hypokinetic: mid anterior, mid anterolateral, mid inferolateral, mid inferior, mid inferoseptal and mid anteroseptal. The following left ventricular wall segments are akinetic: apical anterior, apical lateral, apical inferior, apical septal and apex.    Left ventricular diastolic function is consistent with (grade Ia w/high LAP) impaired relaxation.    Estimated right ventricular systolic pressure from tricuspid regurgitation is normal (<35 mmHg).      STRESS TEST  Results for orders placed during the hospital encounter of 08/12/22    Stress Test With Myocardial Perfusion One Day    Interpretation Summary  LEXISCAN CARDIOLITE REPORT    DATE OF PROCEDURE: 8/12/2022    INDICATION FOR PROCEDURE: Recurrent syncope, dyspnea on exertion, bradycardia, hypertension,  "and diabetes, dyslipidemia, CKD    PROCEDURE PERFORMED: Lexiscan Cardiolite    PROCEDURE COMMENTS:    After informed consent was obtained.  Stress test was supervised by nurse practitioner. Patient's resting heart rate was 53 bpm, resting blood pressure was 134/84, resting EKG revealed sinus bradycardia at the rate of 53 bpm with poor R wave progression.  Patient was given 0.4 mg of regadenosine for stress testing.  There was no significant change in heart rate, blood pressure, symptoms with regadenoson injection.  Patient tolerated procedure well.  Complications were none.    NUCLEAR IMAGIN.  There was   uniform uptake of Cardiolite both in resting and post stress images, no ischemia seen.  2.  Gated images reveal  normal LV size and contractility, LVEF of 77%.    CONCLUSION:  1.  Lexiscan Cardiolite with normal perfusion, negative for ischemia.  2.  Normal wall motion.  LVEF of 77%.    RECOMMENDATIONS:    Clinical correlation recommended.      Deric Johnson MD  22  19:51 EDT      HEART CATHETERIZATION  No results found for this or any previous visit.      Lab Review   I have reviewed the labs      Results from last 7 days   Lab Units 25  2314   MAGNESIUM mg/dL 1.9     Results from last 7 days   Lab Units 25  2314   SODIUM mmol/L 140   POTASSIUM mmol/L 4.4   BUN mg/dL 21   CREATININE mg/dL 1.22*   CALCIUM mg/dL 8.7         Results from last 7 days   Lab Units 25  2314 25  0216 25  0253   WBC 10*3/mm3 11.57* 12.77* 10.67   HEMOGLOBIN g/dL 13.2 12.5 13.2   HEMATOCRIT % 41.5 39.1 42.1   PLATELETS 10*3/mm3 237 221 204     Results from last 7 days   Lab Units 25  1522   INR  1.16*   APTT seconds 29.1       RADIOLOGY:  Imaging Results (Last 24 Hours)       ** No results found for the last 24 hours. **                  )3/18/2025  Derci Johnson MD      EMR Dragon/Transcription:   \"Dictated utilizing Dragon dictation\".   "

## 2025-03-18 NOTE — PLAN OF CARE
Problem: Adult Inpatient Plan of Care  Goal: Absence of Hospital-Acquired Illness or Injury  Intervention: Identify and Manage Fall Risk  Recent Flowsheet Documentation  Taken 3/18/2025 0800 by Minnie Howard RN  Safety Promotion/Fall Prevention:   safety round/check completed   activity supervised   assistive device/personal items within reach   clutter free environment maintained   fall prevention program maintained   gait belt   nonskid shoes/slippers when out of bed  Intervention: Prevent Skin Injury  Recent Flowsheet Documentation  Taken 3/18/2025 0800 by Minnie Howard RN  Body Position:   turned   left  Skin Protection: incontinence pads utilized  Intervention: Prevent and Manage VTE (Venous Thromboembolism) Risk  Recent Flowsheet Documentation  Taken 3/18/2025 0800 by Minnie Howard RN  VTE Prevention/Management:   bilateral   SCDs (sequential compression devices) on  Intervention: Prevent Infection  Recent Flowsheet Documentation  Taken 3/18/2025 0800 by Minnie Howard RN  Infection Prevention:   hand hygiene promoted   rest/sleep promoted  Goal: Optimal Comfort and Wellbeing  Intervention: Provide Person-Centered Care  Recent Flowsheet Documentation  Taken 3/18/2025 0800 by Minnie Howard RN  Trust Relationship/Rapport:   care explained   choices provided     Problem: Fall Injury Risk  Goal: Absence of Fall and Fall-Related Injury  Outcome: Progressing  Intervention: Identify and Manage Contributors  Recent Flowsheet Documentation  Taken 3/18/2025 0800 by Minnie Howard RN  Medication Review/Management: medications reviewed  Intervention: Promote Injury-Free Environment  Recent Flowsheet Documentation  Taken 3/18/2025 0800 by Minnie Howard RN  Safety Promotion/Fall Prevention:   safety round/check completed   activity supervised   assistive device/personal items within reach   clutter free environment maintained   fall prevention program maintained   gait belt   nonskid shoes/slippers when out of  bed     Problem: Stroke, Ischemic (Includes Transient Ischemic Attack)  Goal: Optimal Coping  Intervention: Support Psychosocial Response to Stroke  Recent Flowsheet Documentation  Taken 3/18/2025 0800 by Minnie Howard RN  Supportive Measures: active listening utilized  Goal: Optimal Cerebral Tissue Perfusion  Intervention: Protect and Optimize Cerebral Perfusion  Recent Flowsheet Documentation  Taken 3/18/2025 0800 by Minnie Howard RN  Sensory Stimulation Regulation:   lighting decreased   care clustered  Cerebral Perfusion Promotion: blood pressure monitored  Goal: Optimal Cognitive Function  Intervention: Optimize Cognitive Function  Recent Flowsheet Documentation  Taken 3/18/2025 0800 by Minnie Howard RN  Sensory Stimulation Regulation:   lighting decreased   care clustered  Reorientation Measures: clock in view  Environment Familiarity/Consistency: personal clothing/items utilized  Goal: Improved Communication Skills  Intervention: Optimize Communication Skills  Recent Flowsheet Documentation  Taken 3/18/2025 0800 by Minnie Howard RN  Communication Enhancement Strategies: call light answered in person  Goal: Optimal Functional Ability  Intervention: Optimize Functional Ability  Recent Flowsheet Documentation  Taken 3/18/2025 0800 by Minnie Howard RN  Activity Management: bedrest  Goal: Improved Sensorimotor Function  Intervention: Optimize Range of Motion, Motor Control and Function  Recent Flowsheet Documentation  Taken 3/18/2025 0800 by Minnie Howard RN  Range of Motion: active ROM (range of motion) encouraged  Intervention: Optimize Sensory and Perceptual Ability  Recent Flowsheet Documentation  Taken 3/18/2025 0800 by Minnie Howard RN  Pressure Reduction Techniques: weight shift assistance provided  Pressure Reduction Devices: pressure-redistributing mattress utilized   Goal Outcome Evaluation: patient resting in bed, denies needs at this time.

## 2025-03-18 NOTE — PLAN OF CARE
Problem: Skin Injury Risk Increased  Goal: Skin Health and Integrity  Outcome: Progressing  Intervention: Optimize Skin Protection  Recent Flowsheet Documentation  Taken 3/18/2025 0000 by Bailey Pruett RN  Pressure Reduction Techniques: weight shift assistance provided  Pressure Reduction Devices: pressure-redistributing mattress utilized  Taken 3/17/2025 2000 by Bailey Pruett RN  Pressure Reduction Techniques: weight shift assistance provided  Pressure Reduction Devices: pressure-redistributing mattress utilized  Skin Protection:   incontinence pads utilized   transparent dressing maintained     Problem: Fall Injury Risk  Goal: Absence of Fall and Fall-Related Injury  Outcome: Progressing  Intervention: Identify and Manage Contributors  Recent Flowsheet Documentation  Taken 3/18/2025 0600 by Bailey Pruett RN  Medication Review/Management: medications reviewed  Taken 3/18/2025 0200 by Bailey Pruett RN  Medication Review/Management: medications reviewed  Taken 3/17/2025 2200 by Bailey Pruett RN  Medication Review/Management: medications reviewed  Taken 3/17/2025 2000 by Bailey Pruett RN  Medication Review/Management: medications reviewed  Intervention: Promote Injury-Free Environment  Recent Flowsheet Documentation  Taken 3/18/2025 0600 by Bailey Pruett RN  Safety Promotion/Fall Prevention:   activity supervised   assistive device/personal items within reach   clutter free environment maintained   fall prevention program maintained   lighting adjusted   nonskid shoes/slippers when out of bed   room organization consistent   safety round/check completed  Taken 3/18/2025 0400 by Bailey Pruett RN  Safety Promotion/Fall Prevention:   activity supervised   assistive device/personal items within reach   clutter free environment maintained   fall prevention program maintained   room organization consistent   safety round/check completed  Taken 3/18/2025 0200 by Bailey Pruett  RN  Safety Promotion/Fall Prevention:   activity supervised   assistive device/personal items within reach   clutter free environment maintained   fall prevention program maintained   lighting adjusted   nonskid shoes/slippers when out of bed   room organization consistent   safety round/check completed  Taken 3/18/2025 0000 by Bailey Pruett RN  Safety Promotion/Fall Prevention:   activity supervised   assistive device/personal items within reach   clutter free environment maintained   fall prevention program maintained   safety round/check completed   room organization consistent  Taken 3/17/2025 2200 by Bailey Pruett, RN  Safety Promotion/Fall Prevention:   activity supervised   assistive device/personal items within reach   clutter free environment maintained   fall prevention program maintained   lighting adjusted   nonskid shoes/slippers when out of bed   room organization consistent   safety round/check completed  Taken 3/17/2025 2000 by Bailey Pruett, RN  Safety Promotion/Fall Prevention:   activity supervised   assistive device/personal items within reach   clutter free environment maintained   fall prevention program maintained   safety round/check completed   room organization consistent   Goal Outcome Evaluation:

## 2025-03-18 NOTE — PROGRESS NOTES
[facility] MEDICINE SERVICE  DAILY PROGRESS NOTE    NAME: Lynda Patterson  : 1940  MRN: [id number]      LOS: 7 days     PROVIDER OF SERVICE: Valentin Burrell MD    Chief Complaint: CVA (cerebral vascular accident)    Subjective:   Denies any new complaints. Feels better this am.         Review of Systems:   All 21 ROS were negative except mentioned above.    Objective:     Vital Signs  Temp:  [97.4 °F (36.3 °C)-98.3 °F (36.8 °C)] 97.9 °F (36.6 °C)  Heart Rate:  [73-83] 73  Resp:  [14-19] 16  BP: (109-130)/(52-65) 114/60   Body mass index is 31.04 kg/m².    Physical Exam   General: Alert and oriented to self and place, no acute distress. Mentation at baseline as per family   Lungs: Clear to auscultation, nonlabored respiration.  Heart: RRR  Abdomen: Soft, nontender, nondistended, + bowel sounds.  Neuro: alert and awake, moving all 4 extremities        Scheduled Meds   aspirin, 325 mg, Oral, Daily   Or  aspirin, 300 mg, Rectal, Daily  atorvastatin, 80 mg, Oral, Nightly  [Held by provider] buPROPion XL, 150 mg, Oral, Daily  [Held by provider] busPIRone, 10 mg, Oral, TID  insulin lispro, 2-7 Units, Subcutaneous, 4x Daily With Meals & Nightly  senna-docusate sodium, 2 tablet, Oral, BID  [Held by provider] sertraline, 25 mg, Oral, Daily  sodium chloride, 10 mL, Intravenous, Q12H  sodium chloride, 10 mL, Intravenous, Q12H       PRN Meds     aluminum-magnesium hydroxide-simethicone    senna-docusate sodium **AND** polyethylene glycol **AND** bisacodyl **AND** bisacodyl    Calcium Replacement - Follow Nurse / BPA Driven Protocol    dextrose    dextrose    glucagon (human recombinant)    hydrALAZINE    Magnesium Low Dose Replacement - Follow Nurse / BPA Driven Protocol    nitroglycerin    Phosphorus Replacement - Follow Nurse / BPA Driven Protocol    Potassium Replacement - Follow Nurse / BPA Driven Protocol    prochlorperazine    sodium chloride    sodium chloride    sodium chloride    sodium  chloride    sodium chloride    [Held by provider] traMADol   Infusions         Diagnostic Data    Results from last 7 days   Lab Units 03/17/25  2314   WBC 10*3/mm3 11.57*   HEMOGLOBIN g/dL 13.2   HEMATOCRIT % 41.5   PLATELETS 10*3/mm3 237   GLUCOSE mg/dL 115*   CREATININE mg/dL 1.22*   BUN mg/dL 21   SODIUM mmol/L 140   POTASSIUM mmol/L 4.4   AST (SGOT) U/L 33*   ALT (SGPT) U/L 14   ALK PHOS U/L 66   BILIRUBIN mg/dL 0.5   ANION GAP mmol/L 12.1       No radiology results for the last day      Interval results reviewed.    Assessment/Plan:   Acute ischemic stroke status post tenecteplase on 3/11/2025  HF reduced ejection fraction.  Diabetes  SONYA on CKD 3  GERD  Elevated anion gap    Neurology following the patient.  Recommendations noted.  Continue aspirin statins  2D echo on 3/12/2025 showed ejection fraction of 36 to 40% with hypokinesis  Loop recorder in Place  Cardiology recommendations appreciated- family declined agressive measures, unable to optimize GDMT because of borderline BP  Monitor renal function and avoid nephrotoxic medications and NSAIDs.  Treatment plan discussed with RN.   , psych meds on hold  Daily EKG    VTE Prophylaxis:  Mechanical VTE prophylaxis orders are present.         Code status is   Code Status and Medical Interventions: No CPR (Do Not Attempt to Resuscitate); Limited Support; No intubation (DNI)   Ordered at: 03/11/25 2230     Code Status (Patient has no pulse and is not breathing):    No CPR (Do Not Attempt to Resuscitate)     Medical Interventions (Patient has pulse or is breathing):    Limited Support     Medical Intervention Limits:    No intubation (DNI)     Level Of Support Discussed With:    Health Care Surrogate       Plan for disposition:     Barriers to Discharge:safe dispo, pending precert  Anticipated Date of Discharge: 3/18/2025  Place of Discharge: Skilled nursing facility      Time: 40 minutes     Signature: Electronically signed by Valentin Burrell MD,  03/18/25, 11:55 EDT.  [facility] Hospitalist Team

## 2025-03-19 VITALS
HEIGHT: 59 IN | RESPIRATION RATE: 19 BRPM | OXYGEN SATURATION: 96 % | BODY MASS INDEX: 30.98 KG/M2 | TEMPERATURE: 98.1 F | SYSTOLIC BLOOD PRESSURE: 113 MMHG | WEIGHT: 153.66 LBS | DIASTOLIC BLOOD PRESSURE: 69 MMHG | HEART RATE: 88 BPM

## 2025-03-19 LAB — GLUCOSE BLDC GLUCOMTR-MCNC: 119 MG/DL (ref 70–105)

## 2025-03-19 PROCEDURE — 99231 SBSQ HOSP IP/OBS SF/LOW 25: CPT | Performed by: NURSE PRACTITIONER

## 2025-03-19 PROCEDURE — 82948 REAGENT STRIP/BLOOD GLUCOSE: CPT | Performed by: STUDENT IN AN ORGANIZED HEALTH CARE EDUCATION/TRAINING PROGRAM

## 2025-03-19 RX ORDER — ASPIRIN 81 MG/1
81 TABLET ORAL DAILY
Qty: 30 TABLET | Refills: 0 | Status: SHIPPED | OUTPATIENT
Start: 2025-03-19 | End: 2025-04-18

## 2025-03-19 RX ORDER — ATORVASTATIN CALCIUM 80 MG/1
80 TABLET, FILM COATED ORAL NIGHTLY
Qty: 90 TABLET | Refills: 0 | Status: SHIPPED | OUTPATIENT
Start: 2025-03-19

## 2025-03-19 RX ORDER — ASPIRIN 325 MG
325 TABLET ORAL DAILY
Qty: 30 TABLET | Refills: 0 | Status: SHIPPED | OUTPATIENT
Start: 2025-03-19 | End: 2025-03-19 | Stop reason: HOSPADM

## 2025-03-19 RX ADMIN — SENNOSIDES AND DOCUSATE SODIUM 2 TABLET: 50; 8.6 TABLET ORAL at 08:51

## 2025-03-19 RX ADMIN — ASPIRIN 325 MG ORAL TABLET 325 MG: 325 PILL ORAL at 08:51

## 2025-03-19 NOTE — NURSING NOTE
Lost IV access was told by On-call okay to leave IV out at this time. Pt does not have any IV meds scheduled.

## 2025-03-19 NOTE — PROGRESS NOTES
Cardiology Progress Note    Patient Identification:  Name: Lynda Patterson  Age: 85 y.o.  Sex: female  :  1940  MRN: [id number]                 Follow Up / Chief Complaint: CVA, abnormal echo and EKG  Chief Complaint   Patient presents with    Weakness - Generalized       Interval History: Patient presented with expressive aphasia and right hemiparesis diagnosed with acute CVA and given TNK-tPA.  Echo revealed LV dysfunction and EKG is revealing T wave inversion anterolaterally.  proBNP is elevated.         Subjective: Patient seen and examined.  Chart reviewed.  Labs reviewed.  Discussed with RN taking care of patient.  Patient is without chest pain.        Objective:3/11/2025 - 3/14/2025: proBNP is elevated at 13,661.  Creatinine is elevated at 1.71 on admission and today is 1.41.  Hemoglobin A1c of 6.08.  Normal TSH, CBC  3/17/2025: BUN 18 creatinine 1.17 EGFR 45.3 AST 36 ALT normal WBC 12.77 otherwise CBC unremarkable  3/18/2025: BUN 21 creatinine 1.22 glucose 115 AST 33 WBC 11.57  3/19/2025: glucose 119       History of present illness:       Ms. Lynda Patterson has PMH of     CAD, details of which are not available--Lexiscan Cardiolite 2022 negative for ischemia EF of 77%  Recurrent syncope, Biotronik ILR 3/14/2023  Hypertension  Dyslipidemia  Type 2 diabetes  CKD  Cholecystectomy, hysterectomy  Non-smoker  Allergies/intolerance to Cipro, penicillin, sulfa  Positive family history of premature CAD in patient's son     Presented through emergency room 3/11/2025 with new onset expressive aphasia and hemiparesis on right side.  Code stroke was called and patient received TNK tPA.  Has partial resolution of symptoms.  Workup revealed LV dysfunction therefore cardiology is consulted.  Patient is more awake denies any chest pain or shortness of breath.     Data: Labs from 3/11/2025 - 3/14/2025: proBNP is elevated at 13,661.  Creatinine is elevated at 1.71 on admission and today is 1.41.   Hemoglobin A1c of 6.08.  Normal TSH, CBC  CT head 3/12/2025: No acute intracranial abnormality  MRI 3/11/2025 reveals atrophy and chronic microvascular ischemic changes no acute intracranial process.  EKG done 3/11/2025 reviewed/interpreted by me reveals sinus rhythm with rate of 84 bpm  Echocardiogram 3/12/2025 reviewed/interpreted by me reveals LV dysfunction with EF of 36 to 40% with regional wall motion abnormality.  Grade 1 diastolic dysfunction           Assessment:  :     Acute CVA  New onset LV dysfunction, with regional wall motion abnormalities consistent with ischemic cardiomyopathy.  Bradycardia  Dyspnea on exertion  CAD  Hypertension  Dyslipidemia  Diabetes  CKD Dr. Reid  Obesity with BMI over 30  Positive family history of premature CAD     Recommendations / Plan:        Patient presented with acute CVA received TNK-tPA.  EKG reviewed/interpreted by me reveals T wave versions in anterior lateral leads.  Patient had an echocardiogram which is showing regional wall motion abnormalities and LV dysfunction.  Discussed with patient and family members.  Do not want any invasive or aggressive therapies including catheter LEEROY.  Will continue medical management with aspirin, atorvastatin  Patient blood pressure marginal to add any further guideline directed medical therapy either with beta-blockers, ACE inhibitors, Arni or Aldactone at the current time.  Telemetry is revealing sinus rhythm.  Continue medical management with aspirin and atorvastatin as tolerated.  Continue conservative care.          NP note: Patient seen this morning.  Patient sitting up in chair, no distress noted.  She denies any chest pain or shortness of breath.  Again, asked patient if she wanted any cardiac work up and she declines.  Awaiting rehab placement     Electronically signed by SHABANA Garcia, 03/19/25, 10:21 AM EDT.        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    Macular degeneration     Mild cognitive impairment of uncertain or unknown etiology     Muscle weakness (generalized)     Nondisplaced fracture of proximal end of left fibula 09/03/2021    Reduced mobility     Repeated falls     Sprain of left ankle, initial encounter 08/20/2021    Syncope     Urinary tract infection     Vitamin D deficiency      Past Surgical History:  Past Surgical History:   Procedure Laterality Date    CHOLECYSTECTOMY  1988    TOTAL ABDOMINAL HYSTERECTOMY  1970        Social History:   Social History     Tobacco Use    Smoking status: Never     Passive exposure: Never    Smokeless tobacco: Never   Substance Use Topics    Alcohol use: No      Family History:  Family History   Problem Relation Age of Onset    Stroke Mother     Stroke Father     Heart disease Brother     Heart attack Son     Heart disease Son           Allergies:  Allergies   Allergen Reactions    Ciprofloxacin Nausea And Vomiting    Penicillin G Nausea And Vomiting    Sulfa Antibiotics Nausea And Vomiting     Scheduled Meds:  aspirin, 325 mg, Daily   Or  aspirin, 300 mg, Daily  atorvastatin, 80 mg, Nightly  [Held by provider] buPROPion XL, 150 mg, Daily  [Held by provider] busPIRone, 10 mg, TID  insulin lispro, 2-7 Units, 4x Daily With Meals & Nightly  senna-docusate sodium, 2 tablet, BID  [Held by provider] sertraline, 25 mg, Daily  sodium chloride, 10 mL, Q12H  sodium chloride, 10 mL, Q12H          Review of Systems:   ROS  Review of Systems   Constitution: Negative for chills and fever.   Cardiovascular: Negative for chest pain and  "palpitations.   Respiratory: Negative for cough and hemoptysis.    Gastrointestinal: Negative for nausea.        Constitutional:  Temp:  [96.1 °F (35.6 °C)-98.1 °F (36.7 °C)] 98.1 °F (36.7 °C)  Heart Rate:  [77-88] 88  Resp:  [10-19] 19  BP: ()/(68-85) 113/69    Physical Exam   /69 (BP Location: Right arm, Patient Position: Lying)   Pulse 88   Temp 98.1 °F (36.7 °C) (Oral)   Resp 19   Ht 149.9 cm (59\")   Wt 69.7 kg (153 lb 10.6 oz)   SpO2 96%   BMI 31.04 kg/m²   General:  Appears in no acute distress appears very frail  Eyes: Sclera is anicteric,  conjunctiva is clear   HEENT:  No JVD. Thyroid not visibly enlarged. No mucosal pallor or cyanosis  Respiratory: Respirations regular and unlabored at rest.  Clear to auscultation  Cardiovascular: S1,S2 Regular rate and rhythm.  Gastrointestinal: Abdomen nondistended.  Musculoskeletal:  No abnormal movements  Extremities: No digital clubbing or cyanosis  Skin: Color pink.   Neuro: Alert and awake.    INTAKE AND OUTPUT:    Intake/Output Summary (Last 24 hours) at 3/19/2025 1323  Last data filed at 3/19/2025 0845  Gross per 24 hour   Intake 480 ml   Output 400 ml   Net 80 ml       Cardiographics  Telemetry: Reviewed and interpreted by me reveals sinus rhythm    ECG:   ECG 12 Lead QT Measurement   Final Result   HEART RATE=75  bpm   RR Twbukgxk=175  ms   NE Tubycrmx=461  ms   P Horizontal Axis=-11  deg   P Front Axis=10  deg   QRSD Dfpruaei=594  ms   QT Fzktmmfu=079  ms   EXsY=475  ms   QRS Axis=258  deg   T Wave Axis=205  deg   - ABNORMAL ECG -   Sinus rhythm   Atrial premature complexes   Inferior  infarct, old   Abnrm T, consider ischemia, anterolateral lds   When compared with ECG of 16-Mar-2025 09:53:00,   New or worsened ischemia or infarction   Significant repolarization change   Electronically Signed By: Benjamin Chinchilla ([facility]) 2025-03-17 15:24:46   Date and Time of Study:2025-03-17 12:36:24      ECG 12 Lead QT Measurement   Preliminary Result   HEART " RATE=76  bpm   RR Yszguecr=384  ms   NJ Rfscvlcy=266  ms   P Horizontal Axis=-5  deg   P Front Axis=35  deg   QRSD Efgmoovx=224  ms   QT Qgyalyyr=902  ms   YHcM=582  ms   QRS Axis=210  deg   T Wave Axis=97  deg   - ABNORMAL ECG -   Sinus arrhythmia   Atrial premature complex   Markedly posterior QRS axis   Nonspecific T abnrm, anterolateral leads   ST elevation, consider inferior injury   Prolonged QT interval   Date and Time of Study:2025-03-16 09:53:00      ECG 12 Lead Stroke Evaluation   Final Result   HEART RATE=84  bpm   RR Qgnqzctv=674  ms   NJ Gyqccvks=080  ms   P Horizontal Axis=-15  deg   P Front Axis=39  deg   QRSD Qeofqrml=380  ms   QT Wjxihgfs=271  ms   KBbU=581  ms   QRS Axis=137  deg   T Wave Axis=235  deg   - ABNORMAL ECG -   Sinus rhythm   Low voltage, extremity leads   Abnormal T, consider ischemia, diffuse leads   Prolonged QT interval   No previous ECG available for comparison   Electronically Signed By: Vincent Sanchez ([facility]) 2025-03-12 06:10:21   Date and Time of Study:2025-03-11 16:14:14      Telemetry Scan   Final Result      Telemetry Scan   Final Result      Telemetry Scan   Final Result      Telemetry Scan   Final Result      Telemetry Scan   Final Result      Telemetry Scan   Final Result      Telemetry Scan   Final Result      Telemetry Scan   Final Result      Telemetry Scan   Final Result      Telemetry Scan   Final Result      Telemetry Scan   Final Result      Telemetry Scan   Final Result      Telemetry Scan   Final Result      Telemetry Scan   Final Result      Telemetry Scan   Final Result      Telemetry Scan   Final Result      Telemetry Scan   Final Result      Telemetry Scan   Final Result      Telemetry Scan   Final Result      Telemetry Scan   Final Result      Telemetry Scan   Final Result      Telemetry Scan   Final Result      Telemetry Scan   Final Result      Telemetry Scan   Final Result      Telemetry Scan   Final Result      Telemetry Scan   Final Result      Telemetry  Scan   Final Result      Telemetry Scan   Final Result      Telemetry Scan   Final Result      Telemetry Scan   Final Result      Telemetry Scan   Final Result      Telemetry Scan   Final Result      Telemetry Scan   Final Result      Telemetry Scan   Final Result      Telemetry Scan   Final Result      Telemetry Scan   Final Result      Telemetry Scan   Final Result      Telemetry Scan   Final Result      Telemetry Scan   Final Result      Telemetry Scan   Final Result        I have personally reviewed EKG    Echocardiogram: Results for orders placed during the hospital encounter of 03/11/25    Adult Transthoracic Echo Complete W/ Cont if Necessary Per Protocol    Interpretation Summary    Left ventricular systolic function is moderately decreased. Left ventricular ejection fraction appears to be 36 - 40%.    The following left ventricular wall segments are hypokinetic: mid anterior, mid anterolateral, mid inferolateral, mid inferior, mid inferoseptal and mid anteroseptal. The following left ventricular wall segments are akinetic: apical anterior, apical lateral, apical inferior, apical septal and apex.    Left ventricular diastolic function is consistent with (grade Ia w/high LAP) impaired relaxation.    Estimated right ventricular systolic pressure from tricuspid regurgitation is normal (<35 mmHg).      STRESS TEST  Results for orders placed during the hospital encounter of 08/12/22    Stress Test With Myocardial Perfusion One Day    Interpretation Summary  LEXISCAN CARDIOLITE REPORT    DATE OF PROCEDURE: 8/12/2022    INDICATION FOR PROCEDURE: Recurrent syncope, dyspnea on exertion, bradycardia, hypertension, and diabetes, dyslipidemia, CKD    PROCEDURE PERFORMED: Lexiscan Cardiolite    PROCEDURE COMMENTS:    After informed consent was obtained.  Stress test was supervised by nurse practitioner. Patient's resting heart rate was 53 bpm, resting blood pressure was 134/84, resting EKG revealed sinus bradycardia at  "the rate of 53 bpm with poor R wave progression.  Patient was given 0.4 mg of regadenosine for stress testing.  There was no significant change in heart rate, blood pressure, symptoms with regadenoson injection.  Patient tolerated procedure well.  Complications were none.    NUCLEAR IMAGIN.  There was   uniform uptake of Cardiolite both in resting and post stress images, no ischemia seen.  2.  Gated images reveal  normal LV size and contractility, LVEF of 77%.    CONCLUSION:  1.  Lexiscan Cardiolite with normal perfusion, negative for ischemia.  2.  Normal wall motion.  LVEF of 77%.    RECOMMENDATIONS:    Clinical correlation recommended.      Deric Johnson MD  22  19:51 EDT      HEART CATHETERIZATION  No results found for this or any previous visit.      Lab Review   I have reviewed the labs      Results from last 7 days   Lab Units 25  2314   MAGNESIUM mg/dL 1.9     Results from last 7 days   Lab Units 25  2314   SODIUM mmol/L 140   POTASSIUM mmol/L 4.4   BUN mg/dL 21   CREATININE mg/dL 1.22*   CALCIUM mg/dL 8.7         Results from last 7 days   Lab Units 25  2314 25  0216 25  0253   WBC 10*3/mm3 11.57* 12.77* 10.67   HEMOGLOBIN g/dL 13.2 12.5 13.2   HEMATOCRIT % 41.5 39.1 42.1   PLATELETS 10*3/mm3 237 221 204             RADIOLOGY:  Imaging Results (Last 24 Hours)       ** No results found for the last 24 hours. **                  )3/19/2025  SHABANA Garcia Dragon/Transcription:   \"Dictated utilizing Dragon dictation\".   "

## 2025-03-19 NOTE — PLAN OF CARE
Goal Outcome Evaluation:         Pt slept throughout the night, no complaints at this time. Awaiting precert today.

## 2025-03-19 NOTE — PLAN OF CARE
Problem: Adult Inpatient Plan of Care  Goal: Absence of Hospital-Acquired Illness or Injury  Intervention: Identify and Manage Fall Risk  Recent Flowsheet Documentation  Taken 3/19/2025 1000 by Minnie Doll RN  Safety Promotion/Fall Prevention: safety round/check completed  Taken 3/19/2025 0800 by Minnie Doll RN  Safety Promotion/Fall Prevention:   assistive device/personal items within reach   clutter free environment maintained   fall prevention program maintained   nonskid shoes/slippers when out of bed   room organization consistent   safety round/check completed  Intervention: Prevent Skin Injury  Recent Flowsheet Documentation  Taken 3/19/2025 0800 by Minnie Doll RN  Body Position: weight shifting  Skin Protection: transparent dressing maintained  Intervention: Prevent and Manage VTE (Venous Thromboembolism) Risk  Recent Flowsheet Documentation  Taken 3/19/2025 0800 by Minnie Doll RN  VTE Prevention/Management:   bilateral   SCDs (sequential compression devices) off     Problem: Fall Injury Risk  Goal: Absence of Fall and Fall-Related Injury  Intervention: Identify and Manage Contributors  Recent Flowsheet Documentation  Taken 3/19/2025 0800 by Minnie Doll RN  Medication Review/Management: medications reviewed  Intervention: Promote Injury-Free Environment  Recent Flowsheet Documentation  Taken 3/19/2025 1000 by Minnie Doll RN  Safety Promotion/Fall Prevention: safety round/check completed  Taken 3/19/2025 0800 by Minnie Doll RN  Safety Promotion/Fall Prevention:   assistive device/personal items within reach   clutter free environment maintained   fall prevention program maintained   nonskid shoes/slippers when out of bed   room organization consistent   safety round/check completed   Goal Outcome Evaluation:         Patient did not have any new tests or procedures. The patient remains on room air and did not have any complaints of pain. The patient is being  discharged to rehab. Will continue to monitor.

## 2025-03-19 NOTE — DISCHARGE SUMMARY
"             [facility] Medicine Services  Discharge Summary    Date of Service: 3/19/2025  Patient Name: Lynda Patterson  : 1940  MRN: [id number]    Date of Admission: 3/11/2025  Discharge Diagnosis: Acute ischemic stroke status post tenecteplase on 3/11/2025    Date of Discharge: 3/19/2025  Primary Care Physician: Jenn Tirado MD      Presenting Problem:   CVA (cerebral vascular accident) [I63.9]  Acute CVA (cerebrovascular accident) [I63.9]    Active and Resolved Hospital Problems:  Active Hospital Problems    Diagnosis POA    **CVA (cerebral vascular accident) [I63.9] Yes      Resolved Hospital Problems   No resolved problems to display.         Hospital Course     HPI:    \"Lynda Patterson is a 85 y.o. female with PMH of type 2 diabetes, CKD stage III, anxiety, GERD presented from an extended care facility to the hospital for sudden onset weakness and inability to speak around 12:30 PM. She has expressive aphasia and hemiparesis on the right side. Code stroke was called and consent was given for TNK which was given at 1539.  Dr. Wolff with neurology spoke with family and they denied wanting any surgical intervention but would like supportive care.  CT perfusion showed a significant delay in the left MCA distribution and CTA confirmed an M2 occlusion.  She will be closely monitored, repeat head CT 24 hours post TNK administration, and was admitted to the ICU with a principal diagnosis of CVA (cerebral vascular accident).  Labs remarkable for CO2 19.8, anion gap 16.2, creatinine 1.78, glucose 230, AST 40, WBC 12.09.        ACP: ACP documentation on file. Patient's son and daughter have dual power of . DNR/DNI.\"    Hospital Course:  Acute ischemic stroke status post tenecteplase on 3/11/2025  HF reduced ejection fraction.  Diabetes  SONYA on CKD 3  GERD  Elevated anion gap     Neurology following the patient.  Recommendations noted.  Continue aspirin statins  2D echo " on 3/12/2025 showed ejection fraction of 36 to 40% with hypokinesis  Loop recorder in Place  Cardiology recommendations appreciated- family declined agressive measures, unable to optimize GDMT because of borderline BP  Monitor renal function and avoid nephrotoxic medications and NSAIDs.  Treatment plan discussed with RN.   Resume psych meds, monitor Qtc as outpatient                 DISCHARGE Follow Up Recommendations for labs and diagnostics:   Follow up with primary care provider within 3 days of this discharge.  Follow up with Neurology as outpatient.    Follow up with Cardiology and Psychiatry as outpatient.  Monitor Qtc while on psych meds.         Day of Discharge     Vital Signs:  Temp:  [96.1 °F (35.6 °C)-98.1 °F (36.7 °C)] 98.1 °F (36.7 °C)  Heart Rate:  [77-88] 88  Resp:  [10-19] 19  BP: ()/(68-85) 113/69          Pertinent  and/or Most Recent Results     LAB RESULTS:      Lab 03/17/25 2314 03/17/25 0216 03/16/25 0253 03/14/25 2203 03/14/25 0419   WBC 11.57* 12.77* 10.67 7.93 10.19   HEMOGLOBIN 13.2 12.5 13.2 13.1 13.6   HEMATOCRIT 41.5 39.1 42.1 41.2 44.2   PLATELETS 237 221 204 174 168   NEUTROS ABS 7.62* 8.20* 6.84 4.94 6.40   IMMATURE GRANS (ABS) 0.06* 0.06* 0.05 0.04 0.04   LYMPHS ABS 2.58 3.07 2.70 2.19 2.81   MONOS ABS 0.88 1.01* 0.86 0.62 0.79   EOS ABS 0.40 0.40 0.20 0.12 0.12   MCV 97.9* 95.8 97.0 97.6* 100.9*         Lab 03/17/25 2314 03/17/25 0216 03/16/25  1830 03/16/25 0253 03/14/25 2203 03/14/25 0419   SODIUM 140 142  --  144 146* 146*   POTASSIUM 4.4 4.2 4.5 3.1* 3.8 3.9   CHLORIDE 107 109*  --  109* 111* 114*   CO2 20.9* 21.9*  --  21.8* 23.7 17.8*   ANION GAP 12.1 11.1  --  13.2 11.3 14.2   BUN 21 18  --  23 29* 34*   CREATININE 1.22* 1.17*  --  1.24* 1.38* 1.41*   EGFR 43.0* 45.3*  --  42.2* 37.1* 36.2*   GLUCOSE 115* 96  --  104* 286* 79   CALCIUM 8.7 8.7  --  8.9 8.9 9.0   MAGNESIUM 1.9 1.8  --  2.1 2.1 2.3   PHOSPHORUS 2.6 2.4* 2.5 2.1* 2.4* 2.5         Lab  03/17/25  2314 03/17/25  0216 03/16/25  0253 03/14/25  2203 03/14/25  0419   TOTAL PROTEIN 6.7 6.7 6.7 6.9 6.6   ALBUMIN 3.5 3.6 3.6 3.7 3.5   GLOBULIN 3.2 3.1 3.1 3.2 3.1   ALT (SGPT) 14 16 14 15 13   AST (SGOT) 33* 36* 34* 34* 33*   BILIRUBIN 0.5 0.6 0.8 0.5 0.5   ALK PHOS 66 63 60 57 54         Lab 03/14/25  1410   PROBNP 13,661.0*                 Brief Urine Lab Results       None          Microbiology Results (last 10 days)       Procedure Component Value - Date/Time    MRSA Screen, PCR (Inpatient) - Swab, Nares [[id number]]  (Normal) Collected: 03/11/25 2235    Lab Status: Final result Specimen: Swab from Nares Updated: 03/12/25 0123     MRSA PCR No MRSA Detected    Narrative:      The negative predictive value of this diagnostic test is high and should only be used to consider de-escalating anti-MRSA therapy. A positive result may indicate colonization with MRSA and must be correlated clinically.    CANDIDA AURIS PCR - Swab, Axilla Right, Axilla Left and Groin [[id number]]  (Normal) Collected: 03/11/25 2220    Lab Status: Final result Specimen: Swab from Axilla Right, Axilla Left and Groin Updated: 03/13/25 0910     CANDIDA AURIS PCR Not Detected            CT Head Without Contrast  Result Date: 3/12/2025  Impression: Impression: No acute intracranial findings. Electronically Signed: Liang Key  3/12/2025 5:31 PM EDT  Workstation ID: [id number]    MRI Brain Without Contrast  Result Date: 3/12/2025  Impression: Impression: Atrophy and chronic microvascular ischemic change. No acute intracranial process. Electronically Signed: Mihir García MD  3/12/2025 12:16 AM EDT  Workstation ID: [id number]    XR Chest 1 View  Result Date: 3/11/2025  Impression: Impression: Loop recorder projects over the left lower chest. No radiographic evidence of an acute cardiopulmonary abnormality. Electronically Signed: Liang Key  3/11/2025 4:37 PM EDT  Workstation ID: [id number]    CT Angiogram Head w AI Analysis of LVO  Result  Date: 3/11/2025  Impression: Impression: 1. No evidence of hemodynamically significant stenosis of the carotid or vertebral arteries. 2. Areas of irregularity with beaded appearance involving the distal bilateral internal carotid arteries and distal V2 segment of the left vertebral artery suggesting changes of fibromuscular dysplasia. 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 4. Occlusion of the the anterior M2 branch of the left middle cerebral artery. This would correspond to the perfusion abnormality. Electronically Signed: Bhaskar Kimble MD  3/11/2025 4:31 PM EDT  Workstation ID: [id number]    CT Angiogram Neck  Result Date: 3/11/2025  Impression: Impression: 1. No evidence of hemodynamically significant stenosis of the carotid or vertebral arteries. 2. Areas of irregularity with beaded appearance involving the distal bilateral internal carotid arteries and distal V2 segment of the left vertebral artery suggesting changes of fibromuscular dysplasia. 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 4. Occlusion of the the anterior M2 branch of the left middle cerebral artery. This would correspond to the perfusion abnormality. Electronically Signed: Bhaskar Kimble MD  3/11/2025 4:31 PM EDT  Workstation ID: [id number]    CT CEREBRAL PERFUSION WITH & WITHOUT CONTRAST  Result Date: 3/11/2025  Impression: 1.motion-degraded study with true area of Tmax rater than 6 seconds involving the left MCA distribution. Precise volume is not quantifiable due to motion artifact. Within this region there is a 7 mL volume of cerebral blood flow less than 30% compatible with a component of core infarct. Corresponding anterior M2 occlusion is noted on CTA. These findings were discussed with Dr. Cruz in the emergency department at 3:51 p.m. on 3/11/2025 Electronically Signed: Bhaskar Kimble MD  3/11/2025 3:51 PM EDT  Workstation ID: [id number]    CT Head Without Contrast Stroke Protocol  Result Date: 3/11/2025  Impression: Impression: 1. No  intracranial hemorrhage. 2. Generalized cerebral atrophy with moderate white matter findings of chronic microvascular disease. 3. Left mastoid effusion. Electronically Signed: Hal Cee MD  3/11/2025 3:37 PM EDT  Workstation ID: [id number]              Results for orders placed during the hospital encounter of 03/11/25    Adult Transthoracic Echo Complete W/ Cont if Necessary Per Protocol    Interpretation Summary    Left ventricular systolic function is moderately decreased. Left ventricular ejection fraction appears to be 36 - 40%.    The following left ventricular wall segments are hypokinetic: mid anterior, mid anterolateral, mid inferolateral, mid inferior, mid inferoseptal and mid anteroseptal. The following left ventricular wall segments are akinetic: apical anterior, apical lateral, apical inferior, apical septal and apex.    Left ventricular diastolic function is consistent with (grade Ia w/high LAP) impaired relaxation.    Estimated right ventricular systolic pressure from tricuspid regurgitation is normal (<35 mmHg).      Labs Pending at Discharge:  Pending Results       None            Procedures Performed           Consults:   Consults       Date and Time Order Name Status Description    3/14/2025  8:53 AM Inpatient Cardiology Consult Completed     3/12/2025  1:13 PM Inpatient Hospitalist Consult Completed     3/11/2025  8:58 PM Inpatient Neurology Consult Stroke      3/11/2025  4:04 PM Intensivist (on-call MD unless specified)      3/11/2025  3:23 PM Inpatient Neurology Consult Stroke Completed     3/11/2025  3:23 PM Inpatient Neurology Consult Stroke Completed               Discharge Details        Discharge Medications        New Medications        Instructions Start Date   aspirin 81 MG EC tablet   81 mg, Oral, Daily             Changes to Medications        Instructions Start Date   atorvastatin 80 MG tablet  Commonly known as: LIPITOR  What changed:   medication strength  how much to take  when  to take this   80 mg, Oral, Nightly             Continue These Medications        Instructions Start Date   acetaminophen 325 MG tablet  Commonly known as: TYLENOL   650 mg, Oral, Every 6 Hours PRN      bismuth subsalicylate 262 MG/15ML suspension  Commonly known as: PEPTO BISMOL   15 mL, Oral, Daily PRN      buPROPion  MG 24 hr tablet  Commonly known as: WELLBUTRIN XL   150 mg, Daily      busPIRone 10 MG tablet  Commonly known as: BUSPAR   10 mg, 3 Times Daily      camphor-menthol 0.5-0.5 % lotion  Commonly known as: SARNA   1 Application, Topical, 2 Times Daily PRN      loperamide 2 MG capsule  Commonly known as: IMODIUM   2 mg, Oral, Every 6 Hours PRN      Hermelinda's magic butt cream   1 Application, 4 Times Daily PRN      midodrine 5 MG tablet  Commonly known as: PROAMATINE   5 mg, Oral, 2 Times Daily PRN      sertraline 25 MG tablet  Commonly known as: ZOLOFT   25 mg, Daily             Stop These Medications      cephalexin 250 MG capsule  Commonly known as: KEFLEX     ferrous sulfate 325 (65 FE) MG tablet     guaifenesin 100 MG/5ML liquid  Commonly known as: ROBITUSSIN     traMADol 50 MG tablet  Commonly known as: ULTRAM     vitamin D 1.25 MG (31983 UT) capsule capsule  Commonly known as: ERGOCALCIFEROL              Allergies   Allergen Reactions    Ciprofloxacin Nausea And Vomiting    Penicillin G Nausea And Vomiting    Sulfa Antibiotics Nausea And Vomiting         Discharge Disposition:   Long Term Care (DC - External)    Diet:  Hospital:  Diet Order   Procedures    Diet: Regular/House, Diabetic; Consistent Carbohydrate; No Straw; Texture: Pureed (NDD 1); Fluid Consistency: Nectar Thick         Discharge Activity:   as tolerated       CODE STATUS:  Code Status and Medical Interventions: No CPR (Do Not Attempt to Resuscitate); Limited Support; No intubation (DNI)   Ordered at: 03/11/25 4870     Code Status (Patient has no pulse and is not breathing):    No CPR (Do Not Attempt to Resuscitate)     Medical  Interventions (Patient has pulse or is breathing):    Limited Support     Medical Intervention Limits:    No intubation (DNI)     Level Of Support Discussed With:    Health Care Surrogate         No future appointments.        Time spent on Discharge including face to face service:  >35 minutes    Signature: Electronically signed by Valentin Burrell MD, 03/19/25, 11:17 EDT.  [facility] Hospitalist Team

## 2025-03-20 ENCOUNTER — TELEPHONE (OUTPATIENT)
Dept: CARDIOLOGY | Facility: CLINIC | Age: 85
End: 2025-03-20
Payer: MEDICARE

## 2025-03-20 NOTE — TELEPHONE ENCOUNTER
Caller: CARLOS CHÁVEZ    Relationship to patient: NURSING HOME- ASK FOR ROSA ELENA Gomez call back number: [phone]    New or established patient?  [] New  [x] Established    Date of discharge: 03/19/2025    Facility discharged from:     Diagnosis/Symptoms:         Specialty Only: Did you see a [facility] provider?    [x] Yes  [] No  If so, who? DR ARMENTA    Additional Details: PT NEEDS 1 WEEK F/U  UNABLE TO WT

## 2025-03-20 NOTE — CASE MANAGEMENT/SOCIAL WORK
Case Management Discharge Note      Final Note: [facility]  (Long term care-P.P.)         Selected Continued Care - Discharged on 3/19/2025 Admission date: 3/11/2025 - Discharge disposition: Long Term Care (DC - External)      Destination Coordination complete.      Service Provider Services Address Phone Fax Patient Preferred    [facility] [address] [facility] [place] [id number] [phone] --              Durable Medical Equipment    No services have been selected for the patient.                Dialysis/Infusion    No services have been selected for the patient.                Home Medical Care    No services have been selected for the patient.                Therapy    No services have been selected for the patient.                Community Resources    No services have been selected for the patient.                Community & DME    No services have been selected for the patient.                    Transportation Services  Ambulance: [facility]    Final Discharge Disposition Code: 04 - intermediate care facility

## 2025-03-20 NOTE — CASE MANAGEMENT/SOCIAL WORK
Case Management Discharge Note      Final Note: [facility]  (Long term care-P.P.)         Selected Continued Care - Discharged on 3/19/2025 Admission date: 3/11/2025 - Discharge disposition: Long Term Care (DC - External)      Destination Coordination complete.      Service Provider Services Address Phone Fax Patient Preferred    [facility] [address] [facility] [address] [facility] [place] [address] [phone] --                 Transportation Services  Ambulance: [facility]    Final Discharge Disposition Code: 04 - intermediate care facility

## 2025-03-23 LAB
QT INTERVAL: 446 MS
QTC INTERVAL: 504 MS

## 2025-04-08 ENCOUNTER — OFFICE VISIT (OUTPATIENT)
Dept: CARDIOLOGY | Facility: CLINIC | Age: 85
End: 2025-04-08
Payer: MEDICARE

## 2025-04-08 VITALS
HEIGHT: 59 IN | SYSTOLIC BLOOD PRESSURE: 140 MMHG | DIASTOLIC BLOOD PRESSURE: 70 MMHG | HEART RATE: 72 BPM | OXYGEN SATURATION: 99 % | BODY MASS INDEX: 31.04 KG/M2

## 2025-04-08 DIAGNOSIS — I63.9 CEREBROVASCULAR ACCIDENT (CVA), UNSPECIFIED MECHANISM: ICD-10-CM

## 2025-04-08 DIAGNOSIS — I51.9 LV DYSFUNCTION: ICD-10-CM

## 2025-04-08 DIAGNOSIS — I95.1 ORTHOSTATIC HYPOTENSION: ICD-10-CM

## 2025-04-08 DIAGNOSIS — I10 ESSENTIAL (PRIMARY) HYPERTENSION: ICD-10-CM

## 2025-04-08 DIAGNOSIS — Z09 HOSPITAL DISCHARGE FOLLOW-UP: Primary | ICD-10-CM

## 2025-04-08 RX ORDER — NYSTATIN 100000 U/G
1 OINTMENT TOPICAL 2 TIMES DAILY
COMMUNITY
Start: 2025-03-21

## 2025-04-08 NOTE — PROGRESS NOTES
"    Subjective:     Encounter Date:04/08/2025      Patient ID: Lynda Patterson is a 85 y.o. female.    Chief Complaint: hospital follow up     History of Present Illness      Ms. Lynda Patterson has PMH of     CAD, details of which are not available--Lexiscan Cardiolite 8/12/2022 negative for ischemia EF of 77%  Recurrent syncope, Biotronik ILR 3/14/2023  Hypertension  Dyslipidemia  Type 2 diabetes  CKD  Cholecystectomy, hysterectomy  Non-smoker  Allergies/intolerance to Cipro, penicillin, sulfa  Positive family history of premature CAD in patient's son     \"Presented through emergency room 3/11/2025 with new onset expressive aphasia and hemiparesis on right side.  Code stroke was called and patient received TNK tPA.  Has partial resolution of symptoms.  Workup revealed LV dysfunction therefore cardiology is consulted'  During hospitalization patient and family did not want any aggressive care due to patient's age.       Patient is here today for hospital follow up.  She is here today with a care taker from her rehab facility and in a wheelchair.  She reports she is participating in rehab and hopes to go home.  She denies any chest pain she does report occasional shortness of breath but no lower extremity edema.  She does have episodes of intermittent lightheadedness when standing.  She has episodes of orthostasis requiring as needed midodrine records her last dose was on 4/2/2025.    In office today her blood pressure is 152/70 in the left arm 140/70 in the right arm heart rate 72 oxygen 99% room air weight was unable to be obtained as she was unable to stand today.      Blood pressures from facility been reviewed and unremarkable       Work up in hospital -   CT head 3/12/2025: No acute intracranial abnormality  MRI 3/11/2025 reveals atrophy and chronic microvascular ischemic changes no acute intracranial process.  EKG done 3/11/2025 reviewed/interpreted by me reveals sinus rhythm with rate of 84 " bpm  Echocardiogram 3/12/2025 reviewed/interpreted by me reveals LV dysfunction with EF of 36 to 40% with regional wall motion abnormality.  Grade 1 diastolic dysfunction          Lab Review:   3/11/2025 - 3/14/2025: proBNP is elevated at 13,661.  Creatinine is elevated at 1.71 on admission and today is 1.41.  Hemoglobin A1c of 6.08.  Normal TSH, CBC  3/17/2025: BUN 18 creatinine 1.17 EGFR 45.3 AST 36 ALT normal WBC 12.77 otherwise CBC unremarkable  3/18/2025: BUN 21 creatinine 1.22 glucose 115 AST 33 WBC 11.57  3/19/2025: glucose 119      Labs from 3/27/2025 BMP unremarkable except glucose 130 creatinine 1.5       The following portions of the patient's history were reviewed and updated as appropriate: allergies, current medications, past family history, past medical history, past social history, past surgical history, and problem list.      Review of Systems   Constitutional: Negative for malaise/fatigue.   Cardiovascular:  Positive for dyspnea on exertion. Negative for chest pain, leg swelling and palpitations.   Respiratory:  Negative for cough and shortness of breath.    Gastrointestinal:  Negative for abdominal pain, nausea and vomiting.   Neurological:  Positive for light-headedness. Negative for dizziness, focal weakness, headaches and numbness.   All other systems reviewed and are negative.        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    Macular degeneration     Mild cognitive impairment of uncertain or unknown etiology     Muscle weakness (generalized)     Nondisplaced fracture of proximal end of left fibula 09/03/2021    Reduced mobility     Repeated falls     Sprain  "of left ankle, initial encounter 08/20/2021    Syncope     Urinary tract infection     Vitamin D deficiency      Past Surgical History:   Procedure Laterality Date    CHOLECYSTECTOMY  1988    TOTAL ABDOMINAL HYSTERECTOMY  1970     /70 (BP Location: Right arm, Patient Position: Sitting, Cuff Size: Adult)   Pulse 72   Ht 149.9 cm (59\")   SpO2 99%   BMI 31.04 kg/m²   Family History   Problem Relation Age of Onset    Stroke Mother     Stroke Father     Heart disease Brother     Heart attack Son     Heart disease Son        Current Outpatient Medications:     acetaminophen (TYLENOL) 325 MG tablet, Take 2 tablets by mouth Every 6 (Six) Hours As Needed for Mild Pain., Disp: , Rfl:     aspirin 81 MG EC tablet, Take 1 tablet by mouth Daily for 30 days., Disp: 30 tablet, Rfl: 0    atorvastatin (LIPITOR) 80 MG tablet, Take 1 tablet by mouth Every Night., Disp: 90 tablet, Rfl: 0    bismuth subsalicylate (PEPTO BISMOL) 262 MG/15ML suspension, Take 15 mL by mouth Daily As Needed for Indigestion., Disp: , Rfl:     buPROPion XL (WELLBUTRIN XL) 150 MG 24 hr tablet, Take 1 tablet by mouth Daily., Disp: , Rfl:     busPIRone (BUSPAR) 10 MG tablet, Take 1 tablet by mouth 3 (Three) Times a Day., Disp: , Rfl:     camphor-menthol (SARNA) 0.5-0.5 % lotion, Apply 1 Application topically to the appropriate area as directed 2 (Two) Times a Day As Needed for Itching., Disp: , Rfl:     cephalexin (KEFLEX) 250 MG capsule, Take 1 capsule by mouth Daily., Disp: , Rfl:     Hydrocortisone (Hermelinda's magic butt cream), Apply 1 Application topically to the appropriate area as directed 4 (Four) Times a Day As Needed., Disp: , Rfl:     loperamide (IMODIUM) 2 MG capsule, Take 1 capsule by mouth Every 6 (Six) Hours As Needed for Diarrhea., Disp: , Rfl:     midodrine (PROAMATINE) 5 MG tablet, Take 1 tablet by mouth 2 (Two) Times a Day As Needed., Disp: , Rfl:     nystatin (MYCOSTATIN) 228459 UNIT/GM ointment, Apply 1 Application topically to the " appropriate area as directed 2 (Two) Times a Day., Disp: , Rfl:     sertraline (ZOLOFT) 25 MG tablet, Take 1 tablet by mouth Daily., Disp: , Rfl:   Allergies   Allergen Reactions    Ciprofloxacin Nausea And Vomiting    Penicillin G Nausea And Vomiting    Sulfa Antibiotics Nausea And Vomiting     Social History     Socioeconomic History    Marital status:    Tobacco Use    Smoking status: Never     Passive exposure: Never    Smokeless tobacco: Never   Vaping Use    Vaping status: Never Used   Substance and Sexual Activity    Alcohol use: No    Drug use: No    Sexual activity: Defer                Objective:     Vitals reviewed.   Constitutional:       Appearance: Not in distress. Frail.   Neck:      Vascular: No JVR. JVD normal.   Pulmonary:      Effort: Pulmonary effort is normal.      Breath sounds: Decreased breath sounds present. No wheezing. No rhonchi. No rales.   Chest:      Chest wall: Not tender to palpatation.   Cardiovascular:      PMI at left midclavicular line. Normal rate. Regular rhythm. Normal S1. Normal S2.       Murmurs: There is no murmur.      No gallop.  No click. No rub.   Pulses:     Intact distal pulses.   Edema:     Peripheral edema absent.   Abdominal:      General: Bowel sounds are normal.      Palpations: Abdomen is soft.      Tenderness: There is no abdominal tenderness.   Musculoskeletal: Normal range of motion.         General: No tenderness. Skin:     General: Skin is warm and dry.   Neurological:      General: No focal deficit present.      Mental Status: Alert and oriented to person, place and time.       Procedures                  Assessment:     MDM       Diagnosis Plan   1. Hospital discharge follow-up        2. Cerebrovascular accident (CVA), unspecified mechanism        3. Essential (primary) hypertension        4. Orthostatic hypotension        5. LV dysfunction                       Plan:   Chart reviewed  Labs reviewed  hospitalization reviewed  Reviewed  echocardiogram showing LV dysfunction with patient and discussed need for cardiac cath and that patient did not want work up while in the hospital.     Patient still does not want work up with cardiac catheterization.   Will continue conservation treatment given her age and decreased mobility from CVA.   Continue aspirin, statin  Keep resting Blood pressure higher to avoid orthostatic hypotension.  Midodrine PRN     Electronically signed by SHABANA Garcia, 04/08/25, 4:10 PM EDT.          This document is intended for medical expert use only.  Reading of this document by patients and/or patient's family without participating medical staff guidance may result in misinterpretation and unintended morbidity. Any interpretation of such data is the responsibility of the patient and/or family member responsible for the patient in concert with their primary or specialist providers, not to be left for sources of online search as such as IDRI ([employer]), [employer] or similar queries.  Relying on these approaches to knowledge may result in misinterpretation, misguided goals of care and even death should patient or family members try recommendations outside of the realm of professional medical care in a supervised inpatient environment.

## 2025-04-09 ENCOUNTER — TELEPHONE (OUTPATIENT)
Dept: CARDIOLOGY | Facility: CLINIC | Age: 85
End: 2025-04-09
Payer: MEDICARE

## 2025-04-09 NOTE — TELEPHONE ENCOUNTER
Returned Franci's call (on release form) confirmed her mother in laws appointment date of 7/8/25 with time 3:15pm. Explained to Franci that the nursing home did provide the medication list, allergy list and patient information to us during the appointment. Franci verbalized understanding and stated she would be accompanying patient at next appointment. Franci had no further questions or concerns at this time.

## 2025-04-22 ENCOUNTER — TELEPHONE (OUTPATIENT)
Dept: NEUROLOGY | Facility: CLINIC | Age: 85
End: 2025-04-22
Payer: MEDICARE

## 2025-04-22 NOTE — TELEPHONE ENCOUNTER
Caller: Allen Patterson    Relationship: Emergency Contact    Best call back number:     [phone] (Home)     What is the best time to reach you: ANY    Who are you requesting to speak with (clinical staff, provider,  specific staff member): SCHEDULING     What was the call regarding: PER SON THE SCHEDULING NEEDED TO MADE WITH [facility], I CALLED [phone] AND SPOKE TO NURSE RAMONA    APPOINTMENT WAS SCHEDULED INCORRECTLY, IT HAS BEEN CHANGED BUT NOT WITHIN SUGGESTED TIME FRAME. APPOINTMENT WAS MADE FOR 09-16-25

## 2025-07-01 LAB
MC_CV_MDC_IDC_RATE_1: 160
MC_CV_MDC_IDC_RATE_1: 3
MC_CV_MDC_IDC_RATE_1: 35
MC_CV_MDC_IDC_ZONE_ID: 1
MC_CV_MDC_IDC_ZONE_ID: 2
MC_CV_MDC_IDC_ZONE_ID: 3
MC_CV_MDC_IDC_ZONE_ID: 4
MC_CV_MDC_IDC_ZONE_ID: 5
MDC_IDC_MSMT_BATTERY_STATUS: NORMAL
MDC_IDC_PG_IMPLANT_DTM: NORMAL
MDC_IDC_PG_MFG: NORMAL
MDC_IDC_PG_MODEL: NORMAL
MDC_IDC_PG_SERIAL: NORMAL
MDC_IDC_PG_TYPE: NORMAL
MDC_IDC_SESS_DTM: NORMAL
MDC_IDC_SESS_TYPE: NORMAL
MDC_IDC_SET_ZONE_DETECTION_DETAILS: 16
MDC_IDC_SET_ZONE_STATUS: NORMAL
MDC_IDC_SET_ZONE_TYPE: NORMAL
MDC_IDC_STAT_AT_BURDEN_PERCENT: 0

## 2025-07-07 NOTE — PROGRESS NOTES
"    Subjective:     Encounter Date:07/08/2025        Patient ID: Lynda Patterson is a 85 y.o. female.    Chief Complaint: 3 month follow up     History of Present Illness      Ms. Lynda Patterson has PMH of     CAD, details of which are not available--Lexiscan Cardiolite 8/12/2022 negative for ischemia EF of 77%  Recurrent syncope, Biotronik ILR 3/14/2023  Hypertension  Dyslipidemia  Type 2 diabetes  CKD  Cholecystectomy, hysterectomy  Non-smoker  Allergies/intolerance to Cipro, penicillin, sulfa  Positive family history of premature CAD in patient's son     \"Presented through emergency room 3/11/2025 with new onset expressive aphasia and hemiparesis on right side.  Code stroke was called and patient received TNK tPA.  Has partial resolution of symptoms.  Workup revealed LV dysfunction therefore cardiology is consulted'  During hospitalization patient and family did not want any aggressive care due to patient's age.       Patient denies any chest pain shortness of breath lower extremity edema.  She is in a wheelchair daughter-in-law is with her today as well as her caregiver from her nursing facility.  Patient has mild cognitive likely dementia.  Blood pressure is elevated today 156/92 left arm 165/91 right arm heart rate 68 oxygen 96% on room air she is in a wheelchair and unable to be weighed    Blood pressures from facility been reviewed and unremarkable       Work up in hospital -   CT head 3/12/2025: No acute intracranial abnormality  MRI 3/11/2025 reveals atrophy and chronic microvascular ischemic changes no acute intracranial process.  EKG done 3/11/2025 reviewed/interpreted by me reveals sinus rhythm with rate of 84 bpm  Echocardiogram 3/12/2025 reviewed/interpreted by me reveals LV dysfunction with EF of 36 to 40% with regional wall motion abnormality.  Grade 1 diastolic dysfunction          Lab Review:   3/11/2025 - 3/14/2025: proBNP is elevated at 13,661.  Creatinine is elevated at 1.71 on " admission and today is 1.41.  Hemoglobin A1c of 6.08.  Normal TSH, CBC  3/17/2025: BUN 18 creatinine 1.17 EGFR 45.3 AST 36 ALT normal WBC 12.77 otherwise CBC unremarkable  3/18/2025: BUN 21 creatinine 1.22 glucose 115 AST 33 WBC 11.57  3/19/2025: glucose 119      Labs from 3/27/2025 BMP unremarkable except glucose 130 creatinine 1.5       The following portions of the patient's history were reviewed and updated as appropriate: allergies, current medications, past family history, past medical history, past social history, past surgical history, and problem list.      Review of Systems   Constitutional: Negative for malaise/fatigue.   Cardiovascular:  Negative for chest pain, dyspnea on exertion, leg swelling and palpitations.   Respiratory:  Negative for cough and shortness of breath.    Gastrointestinal:  Negative for abdominal pain, nausea and vomiting.   Neurological:  Negative for dizziness, focal weakness, headaches, light-headedness, numbness and weakness.   All other systems reviewed and are negative.        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    Macular degeneration     Mild cognitive impairment of uncertain or unknown etiology     Muscle weakness (generalized)     Nondisplaced fracture of proximal end of left fibula 09/03/2021    Reduced mobility     Repeated falls     Sprain of left ankle, initial encounter 08/20/2021    Syncope     Urinary tract infection     Vitamin D deficiency      Past Surgical History:   Procedure Laterality Date    CHOLECYSTECTOMY  1988    TOTAL ABDOMINAL HYSTERECTOMY  1970     /91 (BP Location: Right arm, Patient Position: Sitting, Cuff  "Size: Adult)   Pulse 68   Ht 149.9 cm (59\")   Wt 67.9 kg (149 lb 9.6 oz) Comment: July 1st per facility  SpO2 96%   BMI 30.22 kg/m²   Family History   Problem Relation Age of Onset    Stroke Mother     Stroke Father     Heart disease Brother     Heart attack Son     Heart disease Son        Current Outpatient Medications:     acetaminophen (TYLENOL) 325 MG tablet, Take 2 tablets by mouth Every 6 (Six) Hours As Needed for Mild Pain., Disp: , Rfl:     aspirin 81 MG chewable tablet, Chew 1 tablet Daily., Disp: , Rfl:     atorvastatin (LIPITOR) 80 MG tablet, Take 1 tablet by mouth Every Night., Disp: 90 tablet, Rfl: 0    bismuth subsalicylate (PEPTO BISMOL) 262 MG/15ML suspension, Take 15 mL by mouth Daily As Needed for Indigestion., Disp: , Rfl:     buPROPion XL (WELLBUTRIN XL) 150 MG 24 hr tablet, Take 1 tablet by mouth Daily., Disp: , Rfl:     busPIRone (BUSPAR) 10 MG tablet, Take 1 tablet by mouth 3 (Three) Times a Day., Disp: , Rfl:     camphor-menthol (SARNA) 0.5-0.5 % lotion, Apply 1 Application topically to the appropriate area as directed 2 (Two) Times a Day As Needed for Itching., Disp: , Rfl:     cephalexin (KEFLEX) 250 MG capsule, Take 1 capsule by mouth Daily., Disp: , Rfl:     Hydrocortisone (Hermelinda's magic butt cream), Apply 1 Application topically to the appropriate area as directed 4 (Four) Times a Day As Needed., Disp: , Rfl:     loperamide (IMODIUM) 2 MG capsule, Take 1 capsule by mouth Every 6 (Six) Hours As Needed for Diarrhea., Disp: , Rfl:     sertraline (ZOLOFT) 25 MG tablet, Take 1 tablet by mouth Daily., Disp: , Rfl:     midodrine (PROAMATINE) 5 MG tablet, Take 1 tablet by mouth 2 (Two) Times a Day As Needed. (Patient not taking: Reported on 7/8/2025), Disp: , Rfl:     nystatin (MYCOSTATIN) 466318 UNIT/GM ointment, Apply 1 Application topically to the appropriate area as directed 2 (Two) Times a Day. (Patient not taking: Reported on 7/8/2025), Disp: , Rfl:   Allergies   Allergen Reactions    " Ciprofloxacin Nausea And Vomiting    Penicillin G Nausea And Vomiting    Sulfa Antibiotics Nausea And Vomiting     Social History     Socioeconomic History    Marital status:    Tobacco Use    Smoking status: Never     Passive exposure: Never    Smokeless tobacco: Never   Vaping Use    Vaping status: Never Used   Substance and Sexual Activity    Alcohol use: No    Drug use: No    Sexual activity: Defer                Objective:     Vitals reviewed.   Constitutional:       Appearance: Not in distress. Frail.   Neck:      Vascular: No JVR. JVD normal.   Pulmonary:      Effort: Pulmonary effort is normal.      Breath sounds: Decreased breath sounds present. No wheezing. No rhonchi. No rales.   Chest:      Chest wall: Not tender to palpatation.   Cardiovascular:      PMI at left midclavicular line. Normal rate. Regular rhythm. Normal S1. Normal S2.       Murmurs: There is no murmur.      No gallop.  No click. No rub.   Pulses:     Intact distal pulses.   Edema:     Peripheral edema absent.   Abdominal:      General: Bowel sounds are normal.      Palpations: Abdomen is soft.      Tenderness: There is no abdominal tenderness.   Musculoskeletal: Normal range of motion.         General: No tenderness. Skin:     General: Skin is warm and dry.   Neurological:      General: No focal deficit present.      Mental Status: Alert and oriented to person, place and time. Exhibits a cognitive deficit.       Procedures                  Assessment:     MDM       Diagnosis Plan   1. LV dysfunction        2. Cerebrovascular accident (CVA), unspecified mechanism        3. Essential (primary) hypertension        4. Stenosis of carotid artery, unspecified laterality        5. Mixed hyperlipidemia                         Plan:   Chart reviewed  Labs reviewed  hospitalization reviewed  Reviewed echocardiogram showing LV dysfunction with patient and discussed need for cardiac cath and that patient did not want work up while in the  hospital.     Patient still does not want work up with cardiac catheterization.   Will continue conservation treatment given her age and decreased mobility from CVA.   Continue aspirin, statin  Keep resting Blood pressure higher to avoid orthostatic hypotension.        Follow-up on a as needed basis patient is known resident seen facility    Electronically signed by SHABANA Garcia, 07/11/25, 4:19 PM EDT.          This document is intended for medical expert use only.  Reading of this document by patients and/or patient's family without participating medical staff guidance may result in misinterpretation and unintended morbidity. Any interpretation of such data is the responsibility of the patient and/or family member responsible for the patient in concert with their primary or specialist providers, not to be left for sources of online search as such as [employer], [employer] or similar queries.  Relying on these approaches to knowledge may result in misinterpretation, misguided goals of care and even death should patient or family members try recommendations outside of the realm of professional medical care in a supervised inpatient environment.

## 2025-07-08 ENCOUNTER — OFFICE VISIT (OUTPATIENT)
Dept: CARDIOLOGY | Facility: CLINIC | Age: 85
End: 2025-07-08
Payer: MEDICARE

## 2025-07-08 VITALS
OXYGEN SATURATION: 96 % | HEART RATE: 68 BPM | SYSTOLIC BLOOD PRESSURE: 165 MMHG | HEIGHT: 59 IN | DIASTOLIC BLOOD PRESSURE: 91 MMHG | WEIGHT: 149.6 LBS | BODY MASS INDEX: 30.16 KG/M2

## 2025-07-08 DIAGNOSIS — I51.9 LV DYSFUNCTION: Primary | ICD-10-CM

## 2025-07-08 DIAGNOSIS — I65.29 STENOSIS OF CAROTID ARTERY, UNSPECIFIED LATERALITY: ICD-10-CM

## 2025-07-08 DIAGNOSIS — E78.2 MIXED HYPERLIPIDEMIA: ICD-10-CM

## 2025-07-08 DIAGNOSIS — I10 ESSENTIAL (PRIMARY) HYPERTENSION: ICD-10-CM

## 2025-07-08 DIAGNOSIS — I63.9 CEREBROVASCULAR ACCIDENT (CVA), UNSPECIFIED MECHANISM: ICD-10-CM

## 2025-07-08 RX ORDER — ASPIRIN 81 MG/1
81 TABLET, CHEWABLE ORAL DAILY
COMMUNITY

## 2025-07-09 ENCOUNTER — TELEPHONE (OUTPATIENT)
Dept: CARDIOLOGY | Facility: CLINIC | Age: 85
End: 2025-07-09
Payer: MEDICARE

## 2025-07-14 ENCOUNTER — TELEPHONE (OUTPATIENT)
Dept: CARDIOLOGY | Facility: CLINIC | Age: 85
End: 2025-07-14
Payer: MEDICARE

## 2025-07-14 NOTE — TELEPHONE ENCOUNTER
[facility] REQUESTING A COPY OF OFFICE NOTE FROM 7/8 VISIT WITH ESCOBAR.     FAX: [phone]  
Note is finished  
Office note faxed to [facility].   
Office note is currently not complete.   
Yes
Yes

## 2025-07-14 NOTE — TELEPHONE ENCOUNTER
Caller: GENA WITH [facility]    Best call back number: [phone]     What form or medical record are you requesting: LAST OV NOTE AND APPLICABLE TESTING    Who is requesting this form or medical record from you: [facility] CH[facility]    How would you like to receive the form or medical records (pick-up, mail, fax): FAX  If fax, what is the fax number:[phone]    Timeframe paperwork needed: ASAP    Additional notes: GENA WITH [facility] CALLING TO REQUEST COPY OF PTS LAST OV AND ANY APPLICABLE TESTING OR ORDERS THAT WERE MADE -

## 2025-07-25 NOTE — PROGRESS NOTES
"Subjective: History of CVA, left hemispheric infarct    Patient ID: Lynda Patterson is a 85 y.o. female.    CHIEF COMPLAINT: History of CVA    History of Present Illness Ms. Patterson is an 85-year-old  female who presented to [facility] on 3/11/2025 with sudden onset weakness and inability to speak around 12:30 PM. She has expressive aphasia and hemiparesis on the right side.  Dr. Wolff, neurology spoke with family, they denied wanting any surgical intervention but would like supportive care.  CT perfusion showed a significant delay in the left MCA distribution and CTA confirmed an M2 occlusion.  She did get tenecteplase and had a wonderful response to it with all of her aphasia remedying over the night.  Today she presented in a wheelchair accompanied by her daughter-in-law Franci Leonardo PARRY.    Franci stated that the patient has been living in [facility] for approximately 2 years in the [facility] area and has been wheelchair dependent for 1 year.  She states the nurse at the facility saw Ms. Patterson with her eyes deviated to 1 side not moving well and called 911.  The patient today is alert and oriented x 3 moving arms without any difficulty.        Date of Service: 3/19/2025  Patient Name: Lynda Patterson  : 1940  MRN: [id number]     Date of Admission: 3/11/2025  Discharge Diagnosis: Acute ischemic stroke status post tenecteplase on 3/11/2025    Date of Discharge: 3/19/2025  Primary Care Physician: Jenn Tirado MD        Presenting Problem:   CVA (cerebral vascular accident) [I63.9]  Acute CVA (cerebrovascular accident) [I63.9]  Active and Resolved Hospital Problems:  Active Hospital Problems    Diagnosis POA   **CVA (cerebral vascular accident) [I63.9]   HPI:   \"Lynda Patterson is a 85 y.o. female with PMH of type 2 diabetes, CKD stage III, anxiety, GERD presented from an extended care facility to the hospital for sudden onset weakness and " "inability to speak around 12:30 PM. She has expressive aphasia and hemiparesis on the right side. Code stroke was called and consent was given for TNK which was given at 1539.  Dr. Wolff with neurology spoke with family and they denied wanting any surgical intervention but would like supportive care.  CT perfusion showed a significant delay in the left MCA distribution and CTA confirmed an M2 occlusion.  She will be closely monitored, repeat head CT 24 hours post TNK administration, and was admitted to the ICU with a principal diagnosis of CVA (cerebral vascular accident).  Labs remarkable for CO2 19.8, anion gap 16.2, creatinine 1.78, glucose 230, AST 40, WBC 12.09.        ACP: ACP documentation on file. Patient's son and daughter have dual power of . DNR/DNI.\"     Hospital Course:  Acute ischemic stroke status post tenecteplase on 3/11/2025  HF reduced ejection fraction.  Diabetes  SONYA on CKD 3  GERD  Elevated anion gap     Neurology following the patient.  Recommendations noted.  Continue aspirin statins  2D echo on 3/12/2025 showed ejection fraction of 36 to 40% with hypokinesis  Loop recorder in Place  Cardiology recommendations appreciated- family declined agressive measures, unable to optimize GDMT because of borderline BP  Monitor renal function and avoid nephrotoxic medications and NSAIDs.  Treatment plan discussed with RN.   Resume psych meds, monitor Qtc as outpatient       CT Head Without Contrast  Result Date: 3/12/2025  Impression: Impression: No acute intracranial findings. Electronically Signed: Liang Key  3/12/2025 5:31 PM EDT  Workstation ID: [id number]     MRI Brain Without Contrast  Result Date: 3/12/2025  Impression: Impression: Atrophy and chronic microvascular ischemic change. No acute intracranial process. Electronically Signed: Mihir García MD  3/12/2025 12:16 AM EDT  Workstation ID: [id number]     XR Chest 1 View  Result Date: 3/11/2025  Impression: Impression: Loop recorder " projects over the left lower chest. No radiographic evidence of an acute cardiopulmonary abnormality. Electronically Signed: Liang Key  3/11/2025 4:37 PM EDT  Workstation ID: [id number]     CT Angiogram Head w AI Analysis of LVO  Result Date: 3/11/2025  Impression: Impression: 1. No evidence of hemodynamically significant stenosis of the carotid or vertebral arteries. 2. Areas of irregularity with beaded appearance involving the distal bilateral internal carotid arteries and distal V2 segment of the left vertebral artery suggesting changes of fibromuscular dysplasia. 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 4. Occlusion of the the anterior M2 branch of the left middle cerebral artery. This would correspond to the perfusion abnormality. Electronically Signed: Bhaskar Kimble MD  3/11/2025 4:31 PM EDT  Workstation ID: [id number]     CT Angiogram Neck  Result Date: 3/11/2025  Impression: Impression: 1. No evidence of hemodynamically significant stenosis of the carotid or vertebral arteries. 2. Areas of irregularity with beaded appearance involving the distal bilateral internal carotid arteries and distal V2 segment of the left vertebral artery suggesting changes of fibromuscular dysplasia. 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 4. Occlusion of the the anterior M2 branch of the left middle cerebral artery. This would correspond to the perfusion abnormality. Electronically Signed: Bhaskar Kimble MD  3/11/2025 4:31 PM EDT  Workstation ID: [id number]     CT CEREBRAL PERFUSION WITH & WITHOUT CONTRAST  Result Date: 3/11/2025  Impression: 1.motion-degraded study with true area of Tmax rater than 6 seconds involving the left MCA distribution. Precise volume is not quantifiable due to motion artifact. Within this region there is a 7 mL volume of cerebral blood flow less than 30% compatible with a component of core infarct. Corresponding anterior M2 occlusion is noted on CTA. These findings were discussed with Dr. Cruz in  the emergency department at 3:51 p.m. on 3/11/2025 Electronically Signed: Bhaskar Kimble MD  3/11/2025 3:51 PM EDT  Workstation ID: [id number]     CT Head Without Contrast Stroke Protocol  Result Date: 3/11/2025  Impression: Impression: 1. No intracranial hemorrhage. 2. Generalized cerebral atrophy with moderate white matter findings of chronic microvascular disease. 3. Left mastoid effusion. Electronically Signed: Hal Cee MD  3/11/2025 3:37 PM EDT  Workstation ID: [id number]        Adult Transthoracic Echo Complete W/ Cont if Necessary Per Protocol   Interpretation Summary    Left ventricular systolic function is moderately decreased. Left ventricular ejection fraction appears to be 36 - 40%.    The following left ventricular wall segments are hypokinetic: mid anterior, mid anterolateral, mid inferolateral, mid inferior, mid inferoseptal and mid anteroseptal. The following left ventricular wall segments are akinetic: apical anterior, apical lateral, apical inferior, apical septal and apex.    Left ventricular diastolic function is consistent with (grade Ia w/high LAP) impaired relaxation.    Estimated right ventricular systolic pressure from tricuspid regurgitation is normal (<35 mmHg).          NEUROLOGY  SUBJECTIVE:   History of present illness: Background per H&P: Lynda Patterson is a 85 y.o. female who was evaluated in CT suite at [facility]  Source of information is mostly the records and also I was able to get in touch with her daughter and later on son, they present here in the hospital in the waiting area  Also with information came from EMS that visited her facility   Apparently she was fine earlier today but probably around 1230 she had sudden onset of weakness on the right side and she could not speak.  I got a code stroke and saw the patient in CT suite immediately   Her CT head did not show anything acute but chronic changes   I talked to the family and did inclusion exclusion and she  met the criteria and I got the consent from them regarding tenecteplase   But her modified Camille is high as she was not a good candidate for any intervention and they did not want any kind of heroics and surgeries   CTP was abnormal with with some motion artifact but clearly we could see significant delay in the left MCA distribution CTA may show an M2 occlusion which is too distant for recannulization anyway   She has cognitive changes and also immobilization and they want everything medical to be done but otherwise are very realistic   Vital signs were stable  She is not on any blood thinners   Inclusion exclusion was done with the family and through the records and reviewing the medication and help from Pat with pharmacy team   Available labs reviewed she is aphasic and not moving the right side and  Eyes are deviated to the left, she is following some commands on the left side but eyes are not crossing midlin  ASSESSMENT/PLAN:  Likely left hemispheric infarct  Abnormal CTP  Abnormal CTA but does not look like she is intervention candidate, based on location and also she has high modified Camille scor   After discussing with the family and the ER physician, tenecteplase given and we will follow post stroke protoco  - Implement post TNK protocol, please see order set  - Admit to ICU  - Keep systolic blood pressure less than 180/105, Cardene drip as needed, avoid hypotension, vital signs per protocol.  - NPO until bedside swallow test completed and passed and/or cleared by speech therapy.  - Closely monitor neurological status, NIH protocol and PRN for changes in neurological status  - Please call with any acute onset of headache, stat CT head at that time.  - Please monitor and call ICU team with any active bleeding.  Modification of stroke risk factors:   - Blood pressure should be less than 130/80 outpatient, HbA1c less than 6.5, LDL less than 70; b12>500 and smoking cessation if applicable. We would be  grateful if the primary team / primary care physician would keep a close watch on the above targets.  - Stroke education  - Follow up with neurologist of choice  I discussed the patient's findings and my recommendations with family, nursing staff, and ER team   Sanjana Harp MD  03/11/25  15:54 EDT       The following portions of the patient's history were reviewed and updated as appropriate: allergies, current medications, past family history, past medical history, past social history, past surgical history and problem list.      Family History   Problem Relation Age of Onset    Stroke Mother     Stroke Father     Heart disease Brother     Heart attack Son     Heart disease Son        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    Macular degeneration     Mild cognitive impairment of uncertain or unknown etiology     Muscle weakness (generalized)     Nondisplaced fracture of proximal end of left fibula 09/03/2021    Reduced mobility     Repeated falls     Sprain of left ankle, initial encounter 08/20/2021    Syncope     Urinary tract infection     Vitamin D deficiency        Social History     Socioeconomic History    Marital status:    Tobacco Use    Smoking status: Never     Passive exposure: Never    Smokeless tobacco: Never   Vaping Use    Vaping status: Never Used   Substance and Sexual Activity    Alcohol use: No    Drug use: No    Sexual activity: Defer         Current Outpatient Medications:     acetaminophen (TYLENOL) 325 MG tablet, Take 2 tablets by mouth Every 6 (Six) Hours As Needed for Mild Pain., Disp: , Rfl:     aspirin 81 MG chewable tablet, Chew 1 tablet  Daily., Disp: , Rfl:     atorvastatin (LIPITOR) 80 MG tablet, Take 1 tablet by mouth Every Night., Disp: 90 tablet, Rfl: 0    bismuth subsalicylate (PEPTO BISMOL) 262 MG/15ML suspension, Take 15 mL by mouth Daily As Needed for Indigestion., Disp: , Rfl:     buPROPion XL (WELLBUTRIN XL) 150 MG 24 hr tablet, Take 1 tablet by mouth Daily., Disp: , Rfl:     busPIRone (BUSPAR) 10 MG tablet, Take 1 tablet by mouth 3 (Three) Times a Day., Disp: , Rfl:     camphor-menthol (SARNA) 0.5-0.5 % lotion, Apply 1 Application topically to the appropriate area as directed 2 (Two) Times a Day As Needed for Itching., Disp: , Rfl:     cephalexin (KEFLEX) 250 MG capsule, Take 1 capsule by mouth Daily., Disp: , Rfl:     Hydrocortisone (Hermelinda's magic butt cream), Apply 1 Application topically to the appropriate area as directed 4 (Four) Times a Day As Needed., Disp: , Rfl:     loperamide (IMODIUM) 2 MG capsule, Take 1 capsule by mouth Every 6 (Six) Hours As Needed for Diarrhea., Disp: , Rfl:     nystatin (MYCOSTATIN) 388547 UNIT/GM ointment, Apply 1 Application topically to the appropriate area as directed 2 (Two) Times a Day., Disp: , Rfl:     sertraline (ZOLOFT) 25 MG tablet, Take 1 tablet by mouth Daily., Disp: , Rfl:     Review of Systems   All other systems reviewed and are negative.       I have reviewed ROS completed by medical assistant.     Objective:    Neurological Exam  Mental Status  Awake and alert. Oriented only to person, place and time. Speech is normal. Language is fluent with no aphasia. Attention and concentration are normal. Fund of knowledge is appropriate for level of education.    Cranial Nerves  CN II: Right visual acuity: Normal. Left visual acuity: Normal. Right normal visual field. Left normal visual field.  CN III, IV, VI: Extraocular movements intact bilaterally. No nystagmus. Normal saccades. Normal smooth pursuit.   Right pupil: 2 mm.   Left pupil: 2 mm.  CN V:  Right: Facial sensation is normal. Jaw strength  "is normal.  Left: Facial sensation is normal on the left. Jaw strength is normal.  CN VII:  Right: There is no facial weakness.  Left: There is no facial weakness.  CN XI: Shoulder shrug strength is normal.  CN XII: Tongue midline without atrophy or fasciculations.    Motor    Patient is not ambulatory and has not been for 1 year.  She reports normal strength in both arms and head and neck..    Gait    The patient does not ambulate and has not for 1 year is wheelchair dependent..       Assessment/Plan:  The patient and her daughter were educated on the Stroke Acronym \"BE FAST\" B=Balance issues, E=Vision changes, F=Face weakness or numbness, A=Arm or Leg weakness or numbness, S=Speech problems and T=Time to call, 911 he voiced understanding.  They were also given a copy of the document \"BEFAST\" to take home and placed on the refrigerator, to also educate friends and family.     I discussed all testing from the patient's stroke hospitalization with the patient and her daughter.    Diagnoses and all orders for this visit:    1. H/O: CVA (cerebrovascular accident) (Primary)    PCP to control: BP<130/90, A1c<6.5, LDL<70, B12>500, control weight, exercise 20 min 3 times a week, continue ASA 81 mg, continue statin, healthy heart diet.     Return if symptoms worsen or fail to improve.    I spent 34 minutes caring for Lynda on this date of service. This time includes time spent by me in the following activities: preparing for the visit, reviewing tests, obtaining and/or reviewing a separately obtained history, performing a medically appropriate examination and/or evaluation, counseling and educating the patient/family/caregiver, documenting information in the medical record, and independently interpreting results and communicating that information with the patient/family/caregiver.      This document has been electronically signed by Nilam SALDAÑA on July 30, 2025 15:40 EDT  "

## 2025-07-28 LAB
MC_CV_MDC_IDC_RATE_1: 160
MC_CV_MDC_IDC_RATE_1: 160
MC_CV_MDC_IDC_RATE_1: 3
MC_CV_MDC_IDC_RATE_1: 3
MC_CV_MDC_IDC_RATE_1: 35
MC_CV_MDC_IDC_RATE_1: 35
MC_CV_MDC_IDC_ZONE_ID: 1
MC_CV_MDC_IDC_ZONE_ID: 1
MC_CV_MDC_IDC_ZONE_ID: 2
MC_CV_MDC_IDC_ZONE_ID: 2
MC_CV_MDC_IDC_ZONE_ID: 3
MC_CV_MDC_IDC_ZONE_ID: 3
MC_CV_MDC_IDC_ZONE_ID: 4
MC_CV_MDC_IDC_ZONE_ID: 4
MC_CV_MDC_IDC_ZONE_ID: 5
MC_CV_MDC_IDC_ZONE_ID: 5
MDC_IDC_MSMT_BATTERY_STATUS: NORMAL
MDC_IDC_MSMT_BATTERY_STATUS: NORMAL
MDC_IDC_PG_IMPLANT_DTM: NORMAL
MDC_IDC_PG_IMPLANT_DTM: NORMAL
MDC_IDC_PG_MFG: NORMAL
MDC_IDC_PG_MFG: NORMAL
MDC_IDC_PG_MODEL: NORMAL
MDC_IDC_PG_MODEL: NORMAL
MDC_IDC_PG_SERIAL: NORMAL
MDC_IDC_PG_SERIAL: NORMAL
MDC_IDC_PG_TYPE: NORMAL
MDC_IDC_PG_TYPE: NORMAL
MDC_IDC_SESS_DTM: NORMAL
MDC_IDC_SESS_DTM: NORMAL
MDC_IDC_SESS_TYPE: NORMAL
MDC_IDC_SESS_TYPE: NORMAL
MDC_IDC_SET_ZONE_DETECTION_DETAILS: 16
MDC_IDC_SET_ZONE_DETECTION_DETAILS: 16
MDC_IDC_SET_ZONE_STATUS: NORMAL
MDC_IDC_SET_ZONE_TYPE: NORMAL
MDC_IDC_STAT_AT_BURDEN_PERCENT: 0
MDC_IDC_STAT_AT_BURDEN_PERCENT: 0

## 2025-07-30 ENCOUNTER — OFFICE VISIT (OUTPATIENT)
Dept: NEUROLOGY | Facility: CLINIC | Age: 85
End: 2025-07-30
Payer: MEDICARE

## 2025-07-30 VITALS
HEIGHT: 59 IN | HEART RATE: 60 BPM | BODY MASS INDEX: 30.22 KG/M2 | SYSTOLIC BLOOD PRESSURE: 140 MMHG | DIASTOLIC BLOOD PRESSURE: 80 MMHG

## 2025-07-30 DIAGNOSIS — Z86.73 H/O: CVA (CEREBROVASCULAR ACCIDENT): Primary | ICD-10-CM
